# Patient Record
Sex: MALE | Race: ASIAN | Employment: FULL TIME | URBAN - METROPOLITAN AREA
[De-identification: names, ages, dates, MRNs, and addresses within clinical notes are randomized per-mention and may not be internally consistent; named-entity substitution may affect disease eponyms.]

---

## 2019-01-08 ENCOUNTER — OFFICE VISIT (OUTPATIENT)
Dept: FAMILY MEDICINE CLINIC | Facility: CLINIC | Age: 69
End: 2019-01-08
Payer: MEDICARE

## 2019-01-08 VITALS
DIASTOLIC BLOOD PRESSURE: 90 MMHG | SYSTOLIC BLOOD PRESSURE: 146 MMHG | BODY MASS INDEX: 22.22 KG/M2 | HEART RATE: 100 BPM | HEIGHT: 69 IN | RESPIRATION RATE: 12 BRPM | OXYGEN SATURATION: 99 % | WEIGHT: 150 LBS

## 2019-01-08 DIAGNOSIS — Z00.00 HEALTHCARE MAINTENANCE: ICD-10-CM

## 2019-01-08 DIAGNOSIS — Z12.11 COLON CANCER SCREENING: ICD-10-CM

## 2019-01-08 DIAGNOSIS — I10 ESSENTIAL HYPERTENSION: ICD-10-CM

## 2019-01-08 DIAGNOSIS — Z13.6 SCREENING FOR CARDIOVASCULAR CONDITION: ICD-10-CM

## 2019-01-08 DIAGNOSIS — R05.3 CHRONIC COUGH: Primary | ICD-10-CM

## 2019-01-08 DIAGNOSIS — Z12.5 PROSTATE CANCER SCREENING: ICD-10-CM

## 2019-01-08 LAB
ALBUMIN SERPL BCP-MCNC: 4.9 G/DL (ref 3.5–4.8)
ALBUMIN/GLOB SERPL: 1.5 {RATIO} (ref 1–2)
ALP SERPL-CCNC: 55 U/L (ref 32–100)
ALT SERPL-CCNC: 74 U/L (ref 17–63)
ANION GAP SERPL CALC-SCNC: 20 MMOL/L (ref 0–18)
AST SERPL-CCNC: 82 U/L (ref 15–41)
BACTERIA UR QL AUTO: NEGATIVE /HPF
BILIRUB SERPL-MCNC: 0.5 MG/DL (ref 0.3–1.2)
BILIRUB UR QL: NEGATIVE
BUN SERPL-MCNC: 6 MG/DL (ref 8–20)
BUN/CREAT SERPL: 7.7 (ref 10–20)
CALCIUM SERPL-MCNC: 9.8 MG/DL (ref 8.5–10.5)
CHLORIDE SERPL-SCNC: 99 MMOL/L (ref 95–110)
CHOLEST SERPL-MCNC: 282 MG/DL (ref 110–200)
CO2 SERPL-SCNC: 19 MMOL/L (ref 22–32)
COLOR UR: YELLOW
COMPLEXED PSA SERPL-MCNC: 8.56 NG/ML (ref 0.01–4)
CREAT SERPL-MCNC: 0.78 MG/DL (ref 0.5–1.5)
ERYTHROCYTE [DISTWIDTH] IN BLOOD BY AUTOMATED COUNT: 14 % (ref 11–15)
EST. AVERAGE GLUCOSE BLD GHB EST-MCNC: 108 MG/DL (ref 68–126)
GLOBULIN PLAS-MCNC: 3.3 G/DL (ref 2.5–3.7)
GLUCOSE SERPL-MCNC: 90 MG/DL (ref 70–99)
GLUCOSE UR-MCNC: NEGATIVE MG/DL
HBA1C MFR BLD HPLC: 5.4 % (ref ?–5.7)
HCT VFR BLD AUTO: 46.1 % (ref 41–52)
HDLC SERPL-MCNC: 170 MG/DL
HGB BLD-MCNC: 15.7 G/DL (ref 13.5–17.5)
HGB UR QL STRIP.AUTO: NEGATIVE
HYALINE CASTS #/AREA URNS AUTO: 4 /LPF
KETONES UR-MCNC: 20 MG/DL
LDLC SERPL CALC-MCNC: 93 MG/DL (ref 0–99)
LEUKOCYTE ESTERASE UR QL STRIP.AUTO: NEGATIVE
MCH RBC QN AUTO: 31.8 PG (ref 27–32)
MCHC RBC AUTO-ENTMCNC: 34.1 G/DL (ref 32–37)
MCV RBC AUTO: 93.3 FL (ref 80–100)
NITRITE UR QL STRIP.AUTO: NEGATIVE
NONHDLC SERPL-MCNC: 112 MG/DL
OSMOLALITY UR CALC.SUM OF ELEC: 283 MOSM/KG (ref 275–295)
PATIENT FASTING: YES
PH UR: 5 [PH] (ref 5–8)
PLATELET # BLD AUTO: 176 K/UL (ref 140–400)
PMV BLD AUTO: 7.7 FL (ref 7.4–10.3)
POTASSIUM SERPL-SCNC: 4.1 MMOL/L (ref 3.3–5.1)
PROT SERPL-MCNC: 8.2 G/DL (ref 5.9–8.4)
PROT UR-MCNC: >=500 MG/DL
PSA SERPL-MCNC: 7.2 NG/ML (ref 0–4)
RBC # BLD AUTO: 4.94 M/UL (ref 4.5–5.9)
RBC #/AREA URNS AUTO: 2 /HPF
SODIUM SERPL-SCNC: 138 MMOL/L (ref 136–144)
SP GR UR STRIP: 1.03 (ref 1–1.03)
TRIGL SERPL-MCNC: 94 MG/DL (ref 1–149)
TSH SERPL-ACNC: 2.68 UIU/ML (ref 0.45–5.33)
UROBILINOGEN UR STRIP-ACNC: <2
VIT C UR-MCNC: 40 MG/DL
WBC # BLD AUTO: 4.7 K/UL (ref 4–11)
WBC #/AREA URNS AUTO: <1 /HPF

## 2019-01-08 PROCEDURE — 99203 OFFICE O/P NEW LOW 30 MIN: CPT | Performed by: FAMILY MEDICINE

## 2019-01-08 PROCEDURE — 83036 HEMOGLOBIN GLYCOSYLATED A1C: CPT | Performed by: FAMILY MEDICINE

## 2019-01-08 PROCEDURE — 36415 COLL VENOUS BLD VENIPUNCTURE: CPT | Performed by: FAMILY MEDICINE

## 2019-01-08 PROCEDURE — 85027 COMPLETE CBC AUTOMATED: CPT | Performed by: FAMILY MEDICINE

## 2019-01-08 PROCEDURE — 80053 COMPREHEN METABOLIC PANEL: CPT | Performed by: FAMILY MEDICINE

## 2019-01-08 PROCEDURE — 80061 LIPID PANEL: CPT | Performed by: FAMILY MEDICINE

## 2019-01-08 PROCEDURE — 84443 ASSAY THYROID STIM HORMONE: CPT | Performed by: FAMILY MEDICINE

## 2019-01-08 PROCEDURE — 81001 URINALYSIS AUTO W/SCOPE: CPT | Performed by: FAMILY MEDICINE

## 2019-01-08 RX ORDER — AMLODIPINE BESYLATE 10 MG/1
10 TABLET ORAL DAILY
COMMUNITY
End: 2019-01-16

## 2019-01-08 NOTE — PROGRESS NOTES
HPI:   Patient presents with:  Cough: c/c gets a cough on and off x6 months  Other: never had a colonoscopy      Lacy Roger is a 80year old male presenting for:  Cough  -intermittent dry cough for past 6mo, mostly in the afternoon  -no GERD however kirby <5.7 %    Estimated Average Glucose 108 68 - 126 mg/dL   LIPID PANEL   Result Value Ref Range    HDL Cholesterol 170 mg/dL    Cholesterol, Total 282 (H) 110 - 200 mg/dL    Triglycerides 94 1 - 149 mg/dL    Non HDL Chol 112 <130 mg/dL    LDL Cholesterol 93 TSH 2.68 01/08/2019 10:33 AM          Medications:    Current Outpatient Medications: AmLODIPine Besylate 10 MG Oral Tab Take 10 mg by mouth daily.  Disp:  Rfl:       Past Medical History:   Diagnosis Date   • Essential hypertension          No past surgic reviewed. ASSESSMENT AND PLAN:   Patient is a 80year old male who presents primarily presents for:    1. Chronic cough  -unclear etiology  -given mild allergic rhinitis appears to be secondary to allergies.  Advises to do trial of antihistamines  -m Orders Placed This Encounter      CBC, Platelet, No Differential [E]      Comp Metabolic Panel (14) [E]      Hemoglobin A1C (Glycohemoglobin) [E]      Lipid Panel [E]      PSA (Screening) [E]      TSH W Reflex To Free T4 [E]      Urinalysis, Routine

## 2019-01-11 PROBLEM — R05.3 CHRONIC COUGH: Status: ACTIVE | Noted: 2019-01-11

## 2019-01-11 PROBLEM — I10 ESSENTIAL HYPERTENSION: Status: ACTIVE | Noted: 2019-01-11

## 2019-01-14 DIAGNOSIS — R97.20 ELEVATED PSA: Primary | ICD-10-CM

## 2019-01-14 DIAGNOSIS — N06.9 ISOLATED PROTEINURIA WITH MORPHOLOGIC LESION: ICD-10-CM

## 2019-01-16 ENCOUNTER — OFFICE VISIT (OUTPATIENT)
Dept: FAMILY MEDICINE CLINIC | Facility: CLINIC | Age: 69
End: 2019-01-16
Payer: MEDICARE

## 2019-01-16 VITALS
OXYGEN SATURATION: 98 % | RESPIRATION RATE: 14 BRPM | BODY MASS INDEX: 22.22 KG/M2 | HEIGHT: 69 IN | DIASTOLIC BLOOD PRESSURE: 100 MMHG | HEART RATE: 110 BPM | WEIGHT: 150 LBS | SYSTOLIC BLOOD PRESSURE: 150 MMHG

## 2019-01-16 DIAGNOSIS — N06.9 ISOLATED PROTEINURIA WITH MORPHOLOGIC LESION: Primary | ICD-10-CM

## 2019-01-16 DIAGNOSIS — R97.20 ELEVATED PSA, LESS THAN 10 NG/ML: ICD-10-CM

## 2019-01-16 DIAGNOSIS — I10 ESSENTIAL HYPERTENSION: ICD-10-CM

## 2019-01-16 LAB
BACTERIA UR QL AUTO: NEGATIVE /HPF
BILIRUB UR QL: NEGATIVE
COLOR UR: YELLOW
CREAT UR-MCNC: 54.6 MG/DL
GLUCOSE UR-MCNC: NEGATIVE MG/DL
HGB UR QL STRIP.AUTO: NEGATIVE
LEUKOCYTE ESTERASE UR QL STRIP.AUTO: NEGATIVE
MICROALBUMIN UR-MCNC: 95 MG/DL (ref 0–1.8)
MICROALBUMIN/CREAT UR: 1739.9 MG/G{CREAT} (ref 0–20)
NITRITE UR QL STRIP.AUTO: NEGATIVE
PH UR: 6 [PH] (ref 5–8)
PROT UR-MCNC: 100 MG/DL
RBC #/AREA URNS AUTO: 0 /HPF
SP GR UR STRIP: 1.01 (ref 1–1.03)
UROBILINOGEN UR STRIP-ACNC: <2
VIT C UR-MCNC: 40 MG/DL
WBC #/AREA URNS AUTO: <1 /HPF

## 2019-01-16 PROCEDURE — 82570 ASSAY OF URINE CREATININE: CPT | Performed by: FAMILY MEDICINE

## 2019-01-16 PROCEDURE — 99214 OFFICE O/P EST MOD 30 MIN: CPT | Performed by: FAMILY MEDICINE

## 2019-01-16 PROCEDURE — 82043 UR ALBUMIN QUANTITATIVE: CPT | Performed by: FAMILY MEDICINE

## 2019-01-16 PROCEDURE — 81001 URINALYSIS AUTO W/SCOPE: CPT | Performed by: FAMILY MEDICINE

## 2019-01-16 RX ORDER — AMLODIPINE BESYLATE AND BENAZEPRIL HYDROCHLORIDE 10; 20 MG/1; MG/1
1 CAPSULE ORAL DAILY
Qty: 90 CAPSULE | Refills: 0 | Status: SHIPPED | OUTPATIENT
Start: 2019-01-16 | End: 2019-04-16

## 2019-01-21 PROBLEM — R97.20 ELEVATED PSA, LESS THAN 10 NG/ML: Status: ACTIVE | Noted: 2019-01-21

## 2019-01-21 PROBLEM — N06.9 ISOLATED PROTEINURIA WITH MORPHOLOGIC LESION: Status: ACTIVE | Noted: 2019-01-21

## 2019-01-21 NOTE — PROGRESS NOTES
HPI:   Patient presents with:  Lab Results      Riki Clayton is a 80year old male presenting for:  HTN  -Taking meds as prescribed-tolerating well without SEs.    -Denies CP, Palpitations, Dizziness, Recent fall, leg edema, SOB, Cough, LOC, HAs, Numbness 74 (H) 01/08/2019 10:33 AM    BILT 0.5 01/08/2019 10:33 AM    TSH 2.68 01/08/2019 10:33 AM          Medications:    Current Outpatient Medications:  Amlodipine Besy-Benazepril HCl 10-20 MG Oral Cap Take 1 capsule by mouth daily.  Disp: 90 capsule Rfl: 0 no edema. Vitals reviewed. ASSESSMENT AND PLAN:   Patient is a 80year old male who presents primarily presents for:    1.  Isolated proteinuria with morphologic lesion  -unclear tiology  -repeat to r/o transient proteinuria  -if still elevated karina

## 2019-01-26 DIAGNOSIS — N06.9 ISOLATED PROTEINURIA WITH MORPHOLOGIC LESION: Primary | ICD-10-CM

## 2019-01-26 DIAGNOSIS — R80.9 MICROALBUMINURIA: ICD-10-CM

## 2019-01-26 DIAGNOSIS — I10 ESSENTIAL HYPERTENSION: ICD-10-CM

## 2019-01-28 ENCOUNTER — TELEPHONE (OUTPATIENT)
Dept: FAMILY MEDICINE CLINIC | Facility: CLINIC | Age: 69
End: 2019-01-28

## 2019-01-28 DIAGNOSIS — R97.20 ELEVATED PSA, LESS THAN 10 NG/ML: Primary | ICD-10-CM

## 2019-01-28 NOTE — TELEPHONE ENCOUNTER
----- Message from Cory Miller MD sent at 1/26/2019 12:23 PM CST -----  Can you please let him know that he is still having too much protein in his urine. We need to send him to a kidney specialist as he needs further testing.   I placed the re

## 2019-02-12 ENCOUNTER — TELEPHONE (OUTPATIENT)
Dept: FAMILY MEDICINE CLINIC | Facility: CLINIC | Age: 69
End: 2019-02-12

## 2019-02-12 NOTE — TELEPHONE ENCOUNTER
Can you please call the daugther and explain that we need permission from the patient in order to disclose results. Then verify with patient if that is ok.   Thanks

## 2019-04-16 ENCOUNTER — APPOINTMENT (OUTPATIENT)
Dept: LAB | Facility: HOSPITAL | Age: 69
End: 2019-04-16
Attending: FAMILY MEDICINE
Payer: MEDICARE

## 2019-04-16 ENCOUNTER — OFFICE VISIT (OUTPATIENT)
Dept: FAMILY MEDICINE CLINIC | Facility: CLINIC | Age: 69
End: 2019-04-16
Payer: MEDICARE

## 2019-04-16 VITALS
TEMPERATURE: 99 F | OXYGEN SATURATION: 99 % | HEART RATE: 115 BPM | BODY MASS INDEX: 22.96 KG/M2 | SYSTOLIC BLOOD PRESSURE: 188 MMHG | RESPIRATION RATE: 13 BRPM | WEIGHT: 155 LBS | DIASTOLIC BLOOD PRESSURE: 100 MMHG | HEIGHT: 69 IN

## 2019-04-16 DIAGNOSIS — I10 ESSENTIAL HYPERTENSION: ICD-10-CM

## 2019-04-16 DIAGNOSIS — I10 ESSENTIAL HYPERTENSION: Primary | ICD-10-CM

## 2019-04-16 DIAGNOSIS — N06.9 ISOLATED PROTEINURIA WITH MORPHOLOGIC LESION: ICD-10-CM

## 2019-04-16 DIAGNOSIS — R00.0 TACHYCARDIA: ICD-10-CM

## 2019-04-16 DIAGNOSIS — R97.20 ELEVATED PSA, LESS THAN 10 NG/ML: ICD-10-CM

## 2019-04-16 PROCEDURE — 93005 ELECTROCARDIOGRAM TRACING: CPT

## 2019-04-16 PROCEDURE — 93010 ELECTROCARDIOGRAM REPORT: CPT | Performed by: FAMILY MEDICINE

## 2019-04-16 PROCEDURE — 99214 OFFICE O/P EST MOD 30 MIN: CPT | Performed by: FAMILY MEDICINE

## 2019-04-16 RX ORDER — AMLODIPINE BESYLATE AND BENAZEPRIL HYDROCHLORIDE 10; 20 MG/1; MG/1
1 CAPSULE ORAL DAILY
Qty: 90 CAPSULE | Refills: 0 | Status: SHIPPED | OUTPATIENT
Start: 2019-04-16 | End: 2019-07-10

## 2019-04-16 RX ORDER — AMLODIPINE BESYLATE AND BENAZEPRIL HYDROCHLORIDE 10; 20 MG/1; MG/1
1 CAPSULE ORAL DAILY
Qty: 90 CAPSULE | Refills: 0 | Status: SHIPPED | OUTPATIENT
Start: 2019-04-16 | End: 2019-04-16

## 2019-04-16 RX ORDER — AMLODIPINE BESYLATE AND BENAZEPRIL HYDROCHLORIDE 10; 20 MG/1; MG/1
CAPSULE ORAL
Qty: 90 CAPSULE | Refills: 0 | OUTPATIENT
Start: 2019-04-16

## 2019-04-16 NOTE — PROGRESS NOTES
HPI:   Patient presents with:  Blood Pressure: blood pressure follow up      Franco Jeffery is a 80year old male presenting for:    Just returned from [place] 1wk ago  -While there was sick with flu. Sx improving gradually.  Still w/ cough and some nasal con CO2 19 (L) 01/08/2019 10:33 AM    CREATSERUM 0.78 01/08/2019 10:33 AM    CA 9.8 01/08/2019 10:33 AM    ALB 4.9 (H) 01/08/2019 10:33 AM    TP 8.2 01/08/2019 10:33 AM    ALKPHO 55 01/08/2019 10:33 AM    AST 82 (H) 01/08/2019 10:33 AM    ALT 74 (H) 01/08/2 Regular rhythm and normal heart sounds. Tachycardia present. No murmur heard. Pulmonary/Chest: Effort normal and breath sounds normal. No respiratory distress. Abdominal: Soft. Bowel sounds are normal. He exhibits no distension.  There is no tenderness 08/27/1950  Annual Physical due on 08/27/1953  Annual Depression Screen due on 08/27/1963  FIT Colorectal Screening due on 08/27/2001  Fall Risk Screening due on 08/27/2016  Pneumococcal PPSV23/PCV13 65+ Years / Low and Medium Risk(1 of 2 - PCV13) due on 0

## 2019-07-10 RX ORDER — AMLODIPINE BESYLATE AND BENAZEPRIL HYDROCHLORIDE 10; 20 MG/1; MG/1
CAPSULE ORAL
Qty: 30 CAPSULE | Refills: 0 | Status: SHIPPED | OUTPATIENT
Start: 2019-07-10 | End: 2019-09-26

## 2019-09-26 ENCOUNTER — TELEPHONE (OUTPATIENT)
Dept: FAMILY MEDICINE CLINIC | Facility: CLINIC | Age: 69
End: 2019-09-26

## 2019-09-26 RX ORDER — AMLODIPINE BESYLATE AND BENAZEPRIL HYDROCHLORIDE 10; 20 MG/1; MG/1
1 CAPSULE ORAL
Qty: 15 CAPSULE | Refills: 0 | Status: SHIPPED | OUTPATIENT
Start: 2019-09-26 | End: 2019-11-06

## 2019-09-26 NOTE — TELEPHONE ENCOUNTER
Patient is going to be out of town for two weeks requesting blood pressure medication to be sent, refill for 15 days sent pt will call to set up appointment no further refills will be given until he is seen

## 2019-11-05 ENCOUNTER — TELEPHONE (OUTPATIENT)
Dept: FAMILY MEDICINE CLINIC | Facility: CLINIC | Age: 69
End: 2019-11-05

## 2019-11-05 NOTE — TELEPHONE ENCOUNTER
Pt called requesting refills for amlodipine. He states he is going out of town on Friday and needs his medication. He asked for at least 3 months refills.  Pt wants his medication to be refilled at [facility]

## 2019-11-06 RX ORDER — AMLODIPINE BESYLATE AND BENAZEPRIL HYDROCHLORIDE 10; 20 MG/1; MG/1
1 CAPSULE ORAL
Qty: 14 CAPSULE | Refills: 0 | Status: SHIPPED | OUTPATIENT
Start: 2019-11-06 | End: 2019-11-06

## 2019-11-06 RX ORDER — AMLODIPINE BESYLATE AND BENAZEPRIL HYDROCHLORIDE 10; 20 MG/1; MG/1
1 CAPSULE ORAL
Qty: 14 CAPSULE | Refills: 0 | Status: SHIPPED | OUTPATIENT
Start: 2019-11-06 | End: 2021-01-20 | Stop reason: SINTOL

## 2019-11-06 NOTE — TELEPHONE ENCOUNTER
Called patient informed him per conversation in September he was suppose to be seen prior to any additional refills,last office visit was in April and his blood pressure was 188/100 I offered patient an appointment to be seen today at 10am he refused state

## 2019-11-06 NOTE — TELEPHONE ENCOUNTER
Patient called back relayed message below per patient he is unable to come in informed him only 2 weeks supply could be given and that he needs to be seen due to elevated b/p  patient verbalized understanding did not wish to come in tomorrow for appointmen

## 2019-11-06 NOTE — TELEPHONE ENCOUNTER
Can you please let him know that we will NOT provide a 3 month refill. On 4/2019 his BP was very high as well as on 1/2019.  He also has been noncompliant with follow-up to the urologist for his elevated prostate levels and the kidney specialist for his ab

## 2020-03-31 RX ORDER — AMLODIPINE BESYLATE AND BENAZEPRIL HYDROCHLORIDE 10; 20 MG/1; MG/1
1 CAPSULE ORAL
Qty: 14 CAPSULE | Refills: 0 | OUTPATIENT
Start: 2020-03-31

## 2020-04-01 RX ORDER — AMLODIPINE BESYLATE AND BENAZEPRIL HYDROCHLORIDE 10; 20 MG/1; MG/1
1 CAPSULE ORAL
Qty: 14 CAPSULE | Refills: 0 | OUTPATIENT
Start: 2020-04-01

## 2020-11-16 ENCOUNTER — TELEPHONE (OUTPATIENT)
Dept: FAMILY MEDICINE CLINIC | Facility: CLINIC | Age: 70
End: 2020-11-16

## 2020-11-16 NOTE — TELEPHONE ENCOUNTER
Livier Morgan called requesting appointment for patient to be seen I informed her she was not listed on patient's consent and we would not be able to schedule or inform her of any medical information, she can have patient or his two other emergency contacts call

## 2020-12-28 ENCOUNTER — HOSPITAL ENCOUNTER (OUTPATIENT)
Age: 70
Discharge: HOME | End: 2020-12-28
Payer: MEDICARE

## 2020-12-28 VITALS
BODY MASS INDEX: 23.49 KG/M2 | HEART RATE: 110 BPM | DIASTOLIC BLOOD PRESSURE: 105 MMHG | RESPIRATION RATE: 18 BRPM | SYSTOLIC BLOOD PRESSURE: 190 MMHG | HEIGHT: 68 IN | WEIGHT: 155 LBS | TEMPERATURE: 97 F | OXYGEN SATURATION: 99 %

## 2020-12-28 DIAGNOSIS — R05.9 COUGH: Primary | ICD-10-CM

## 2020-12-28 PROCEDURE — 99203 OFFICE O/P NEW LOW 30 MIN: CPT | Performed by: EMERGENCY MEDICINE

## 2020-12-28 RX ORDER — BENZONATATE 100 MG/1
100 CAPSULE ORAL 3 TIMES DAILY PRN
Qty: 30 CAPSULE | Refills: 0 | Status: SHIPPED | OUTPATIENT
Start: 2020-12-28 | End: 2021-01-20 | Stop reason: ALTCHOICE

## 2020-12-28 NOTE — ED PROVIDER NOTES
Patient Seen in: [facility]      History   Patient presents with:  Cough/URI    Stated Complaint: Dry Cough/Sore Throat    Angela Bustillo is a 72year old male here for for chronic cough.   Medical history includes but not limited to hypertension normal.      Left Ear: External ear normal.      Nose: Nose normal.      Mouth/Throat:      Mouth: Mucous membranes are moist.      Pharynx: No posterior oropharyngeal erythema. Neck:      Musculoskeletal: Normal range of motion.    Cardiovascular:      R [id number]  [phone]                Medications Prescribed:  Discharge Medication List as of 12/28/2020 12:31 PM    START taking these medications    benzonatate 100 MG Oral Cap  Take 1 capsule (100 mg total) by mouth 3 (three) times daily as needed for cou

## 2021-01-07 ENCOUNTER — TELEPHONE (OUTPATIENT)
Dept: INTERNAL MEDICINE CLINIC | Facility: CLINIC | Age: 71
End: 2021-01-07

## 2021-01-07 NOTE — TELEPHONE ENCOUNTER
Pt's daughter would like the doctor to give her a call regarding her father's condition before he comes in for his appt on 1/20/21.

## 2021-01-12 NOTE — TELEPHONE ENCOUNTER
Can you please call daughter and discuss her concerns to determine if I need to speak with her. She is not listed for releasing info thus I'm unable to discuss anything regarding his health.

## 2021-01-13 NOTE — TELEPHONE ENCOUNTER
Spoke to daughter informed her she is not on the verbal consent and we can not release information to her, she understood and stated she will speak to patient so she could be added during his next appointment

## 2021-01-20 ENCOUNTER — OFFICE VISIT (OUTPATIENT)
Dept: FAMILY MEDICINE CLINIC | Facility: CLINIC | Age: 71
End: 2021-01-20
Payer: MEDICARE

## 2021-01-20 VITALS
DIASTOLIC BLOOD PRESSURE: 100 MMHG | OXYGEN SATURATION: 98 % | BODY MASS INDEX: 25.01 KG/M2 | HEIGHT: 68 IN | WEIGHT: 165 LBS | SYSTOLIC BLOOD PRESSURE: 160 MMHG | HEART RATE: 100 BPM

## 2021-01-20 DIAGNOSIS — I10 ESSENTIAL HYPERTENSION: ICD-10-CM

## 2021-01-20 DIAGNOSIS — Z12.5 ENCOUNTER FOR SCREENING FOR MALIGNANT NEOPLASM OF PROSTATE: ICD-10-CM

## 2021-01-20 DIAGNOSIS — Z13.31 DEPRESSION SCREENING: ICD-10-CM

## 2021-01-20 DIAGNOSIS — Z13.39 SCREENING FOR ALCOHOL PROBLEM: ICD-10-CM

## 2021-01-20 DIAGNOSIS — N06.9 ISOLATED PROTEINURIA WITH MORPHOLOGIC LESION: ICD-10-CM

## 2021-01-20 DIAGNOSIS — Z00.00 ENCOUNTER FOR ANNUAL HEALTH EXAMINATION: Primary | ICD-10-CM

## 2021-01-20 DIAGNOSIS — R97.20 ELEVATED PSA, LESS THAN 10 NG/ML: ICD-10-CM

## 2021-01-20 DIAGNOSIS — Z00.00 HEALTHCARE MAINTENANCE: ICD-10-CM

## 2021-01-20 DIAGNOSIS — R05.3 CHRONIC COUGH: ICD-10-CM

## 2021-01-20 DIAGNOSIS — Z12.11 COLON CANCER SCREENING: ICD-10-CM

## 2021-01-20 DIAGNOSIS — R74.01 TRANSAMINITIS: ICD-10-CM

## 2021-01-20 PROCEDURE — 84443 ASSAY THYROID STIM HORMONE: CPT | Performed by: FAMILY MEDICINE

## 2021-01-20 PROCEDURE — 99213 OFFICE O/P EST LOW 20 MIN: CPT | Performed by: FAMILY MEDICINE

## 2021-01-20 PROCEDURE — 81001 URINALYSIS AUTO W/SCOPE: CPT | Performed by: FAMILY MEDICINE

## 2021-01-20 PROCEDURE — 85025 COMPLETE CBC W/AUTO DIFF WBC: CPT | Performed by: FAMILY MEDICINE

## 2021-01-20 PROCEDURE — 83036 HEMOGLOBIN GLYCOSYLATED A1C: CPT | Performed by: FAMILY MEDICINE

## 2021-01-20 PROCEDURE — 36415 COLL VENOUS BLD VENIPUNCTURE: CPT | Performed by: FAMILY MEDICINE

## 2021-01-20 PROCEDURE — G0444 DEPRESSION SCREEN ANNUAL: HCPCS | Performed by: FAMILY MEDICINE

## 2021-01-20 PROCEDURE — G0439 PPPS, SUBSEQ VISIT: HCPCS | Performed by: FAMILY MEDICINE

## 2021-01-20 PROCEDURE — 82043 UR ALBUMIN QUANTITATIVE: CPT | Performed by: FAMILY MEDICINE

## 2021-01-20 PROCEDURE — G0442 ANNUAL ALCOHOL SCREEN 15 MIN: HCPCS | Performed by: FAMILY MEDICINE

## 2021-01-20 PROCEDURE — 80053 COMPREHEN METABOLIC PANEL: CPT | Performed by: FAMILY MEDICINE

## 2021-01-20 PROCEDURE — 80061 LIPID PANEL: CPT | Performed by: FAMILY MEDICINE

## 2021-01-20 PROCEDURE — 82570 ASSAY OF URINE CREATININE: CPT | Performed by: FAMILY MEDICINE

## 2021-01-20 RX ORDER — AMLODIPINE AND OLMESARTAN MEDOXOMIL 10; 20 MG/1; MG/1
1 TABLET ORAL DAILY
Qty: 90 TABLET | Refills: 0 | Status: SHIPPED | OUTPATIENT
Start: 2021-01-20 | End: 2021-01-21

## 2021-01-20 NOTE — PROGRESS NOTES
CMP,TSH,UA,LIPID,A1C,MICRO,PSA,and CBC labs drawn per Dr Tim Spence orders, Patient tolerated lab draw well

## 2021-01-20 NOTE — PROGRESS NOTES
HPI:   Gadiel Raman is a 72year old male who presents for a Medicare Subsequent Annual Wellness visit (Pt already had Initial Annual Wellness). Not seen in clinic for almost 2yrs.   Pt has not followed up with urologist for his elevated PSA now t of advance directives standard forms performed Face to Face with patient and Family/surrogate (if present), and forms available to patient in AVS     He does NOT have a Power of  for [employer] on file in [address] [place] [address] Rd.    Advance care planning including th cancer in his father. SOCIAL HISTORY:   He  reports that he has never smoked. He has never used smokeless tobacco. He reports current alcohol use. No history on file for drug.      REVIEW OF SYSTEMS:   Review of Systems   Constitutional: Negative for fati Bowel sounds are normal. He exhibits distension (mild). He exhibits no mass. There is no hepatosplenomegaly. There is no abdominal tenderness. Musculoskeletal: Normal range of motion.       Right knee: Normal.      Left knee: Normal.      Comments: +CREPITU 10 ng/ml  -     PSA SCREEN; Future  -     UROLOGY - INTERNAL  -STRONGLY ADVISED TO SEE UROLOGIST THAT HE HAS PREVIOUSLY BEEN REFERRED BUT NOT COMPLIANT    Isolated proteinuria with morphologic lesion  -     MICROALB/CREAT RATIO, RANDOM URINE;  Future  - chart, separate sheet to patient  [address] Internal Lab or Procedure External Lab or Procedure   Diabetes Screening      HbgA1C   Annually HgbA1C (%)   Date Value   01/20/2021 5.9 (H)       No flowsheet data found. Medicare Part B) No vaccine history found This may be covered with your prescription benefits, but Medicare does not cover unless Medically needed    Zoster   Not covered by Medicare Part B No vaccine history found This may be covered with your pharmacy  p

## 2021-01-21 ENCOUNTER — TELEPHONE (OUTPATIENT)
Dept: FAMILY MEDICINE CLINIC | Facility: CLINIC | Age: 71
End: 2021-01-21

## 2021-01-21 LAB
ALBUMIN SERPL-MCNC: 4.2 G/DL (ref 3.4–5)
ALBUMIN/GLOB SERPL: 0.9 {RATIO} (ref 1–2)
ALP LIVER SERPL-CCNC: 71 U/L
ALT SERPL-CCNC: 65 U/L
ANION GAP SERPL CALC-SCNC: 13 MMOL/L (ref 0–18)
AST SERPL-CCNC: 92 U/L (ref 15–37)
BACTERIA UR QL AUTO: NEGATIVE /HPF
BASOPHILS # BLD AUTO: 0.04 X10(3) UL (ref 0–0.2)
BASOPHILS NFR BLD AUTO: 1.2 %
BILIRUB SERPL-MCNC: 0.5 MG/DL (ref 0.1–2)
BILIRUB UR QL: NEGATIVE
BUN BLD-MCNC: 12 MG/DL (ref 7–18)
BUN/CREAT SERPL: 13 (ref 10–20)
CALCIUM BLD-MCNC: 9.5 MG/DL (ref 8.5–10.1)
CHLORIDE SERPL-SCNC: 93 MMOL/L (ref 98–112)
CHOLEST SMN-MCNC: 221 MG/DL (ref ?–200)
CO2 SERPL-SCNC: 22 MMOL/L (ref 21–32)
COLOR UR: YELLOW
COMPLEXED PSA SERPL-MCNC: 10.4 NG/ML (ref ?–4)
CREAT BLD-MCNC: 0.92 MG/DL
CREAT UR-SCNC: 159 MG/DL
DEPRECATED RDW RBC AUTO: 48.9 FL (ref 35.1–46.3)
EOSINOPHIL # BLD AUTO: 0.03 X10(3) UL (ref 0–0.7)
EOSINOPHIL NFR BLD AUTO: 0.9 %
ERYTHROCYTE [DISTWIDTH] IN BLOOD BY AUTOMATED COUNT: 14.9 % (ref 11–15)
EST. AVERAGE GLUCOSE BLD GHB EST-MCNC: 123 MG/DL (ref 68–126)
GLOBULIN PLAS-MCNC: 4.9 G/DL (ref 2.8–4.4)
GLUCOSE BLD-MCNC: 77 MG/DL (ref 70–99)
GLUCOSE UR-MCNC: NEGATIVE MG/DL
HBA1C MFR BLD HPLC: 5.9 % (ref ?–5.7)
HCT VFR BLD AUTO: 33.9 %
HDLC SERPL-MCNC: 99 MG/DL (ref 40–59)
HGB BLD-MCNC: 10.4 G/DL
HGB UR QL STRIP.AUTO: NEGATIVE
HYALINE CASTS #/AREA URNS AUTO: 7 /LPF
IMM GRANULOCYTES # BLD AUTO: 0 X10(3) UL (ref 0–1)
IMM GRANULOCYTES NFR BLD: 0 %
LDLC SERPL CALC-MCNC: 105 MG/DL (ref ?–100)
LEUKOCYTE ESTERASE UR QL STRIP.AUTO: NEGATIVE
LYMPHOCYTES # BLD AUTO: 0.64 X10(3) UL (ref 1–4)
LYMPHOCYTES NFR BLD AUTO: 18.9 %
M PROTEIN MFR SERPL ELPH: 9.1 G/DL (ref 6.4–8.2)
MCH RBC QN AUTO: 27.7 PG (ref 26–34)
MCHC RBC AUTO-ENTMCNC: 30.7 G/DL (ref 31–37)
MCV RBC AUTO: 90.2 FL
MICROALBUMIN UR-MCNC: 123 MG/DL
MICROALBUMIN/CREAT 24H UR-RTO: 773.6 UG/MG (ref ?–30)
MONOCYTES # BLD AUTO: 0.54 X10(3) UL (ref 0.1–1)
MONOCYTES NFR BLD AUTO: 15.9 %
NEUTROPHILS # BLD AUTO: 2.14 X10 (3) UL (ref 1.5–7.7)
NEUTROPHILS # BLD AUTO: 2.14 X10(3) UL (ref 1.5–7.7)
NEUTROPHILS NFR BLD AUTO: 63.1 %
NITRITE UR QL STRIP.AUTO: NEGATIVE
NONHDLC SERPL-MCNC: 122 MG/DL (ref ?–130)
OSMOLALITY SERPL CALC.SUM OF ELEC: 265 MOSM/KG (ref 275–295)
PATIENT FASTING Y/N/NP: NO
PATIENT FASTING Y/N/NP: NO
PH UR: 6 [PH] (ref 5–8)
PLATELET # BLD AUTO: 196 10(3)UL (ref 150–450)
POTASSIUM SERPL-SCNC: 4.1 MMOL/L (ref 3.5–5.1)
PROT UR-MCNC: >=500 MG/DL
RBC # BLD AUTO: 3.76 X10(6)UL
RBC #/AREA URNS AUTO: <1 /HPF
SODIUM SERPL-SCNC: 128 MMOL/L (ref 136–145)
SP GR UR STRIP: 1.02 (ref 1–1.03)
TRIGL SERPL-MCNC: 87 MG/DL (ref 30–149)
TSI SER-ACNC: 2.41 MIU/ML (ref 0.36–3.74)
UROBILINOGEN UR STRIP-ACNC: <2
VLDLC SERPL CALC-MCNC: 17 MG/DL (ref 0–30)
WBC # BLD AUTO: 3.4 X10(3) UL (ref 4–11)
WBC #/AREA URNS AUTO: 1 /HPF

## 2021-01-21 RX ORDER — AMLODIPINE AND VALSARTAN 10; 320 MG/1; MG/1
1 TABLET ORAL DAILY
Qty: 90 TABLET | Refills: 0 | Status: SHIPPED | OUTPATIENT
Start: 2021-01-21 | End: 2021-04-17

## 2021-01-21 NOTE — TELEPHONE ENCOUNTER
Patient is calling because he came in yesterday and got prescribed, amLODIPine-Olmesartan 10-20 MG Oral Tab ,  Stating that the he went to the pharmacy they started that insurance would not cover the cost of medication.  Patient would like another alternati

## 2021-01-21 NOTE — TELEPHONE ENCOUNTER
Pharmacy called stating that medication: amLODIPine-Olmesartan 10-20 MG Oral Tab, is not being approved by insurance. Needs a prior authorization or a new medication prescribed.  Pharmacy states that patient is waiting inside store, will advice that doctor

## 2021-01-21 NOTE — TELEPHONE ENCOUNTER
Bharat Segal 5 minutes ago (3:48 PM)        Patient is calling because he came in yesterday and got prescribed, amLODIPine-Olmesartan 10-20 MG Oral Tab ,  Stating that the he went to the pharmacy they started that insurance would not cover the cost of

## 2021-01-25 ENCOUNTER — TELEPHONE (OUTPATIENT)
Dept: FAMILY MEDICINE CLINIC | Facility: CLINIC | Age: 71
End: 2021-01-25

## 2021-01-25 NOTE — TELEPHONE ENCOUNTER
May 15, 2020      [place] [place] [facility]  [address] [facility].  [place] [address]  Phone: [phone]  Fax: [phone]       Patient: Paloma Bang   YOB: 1965  Date of Visit: 05/15/2020    To Whom It May Concern:    Ritika Bang  was at [facility] on 05/15/2020. She may return to work/school on 06/15/2020 with no restrictions. If you have any questions or concerns, or if I can be of further assistance, please do not hesitate to contact me.    Sincerely,    Jenn Mayorga MD      Daughter is calling wanting to speak to Dr. Julieta Winn or Nurse regarding blood work results.

## 2021-01-26 NOTE — TELEPHONE ENCOUNTER
[employer]    Result Notes   Yoan Masters MD   2021  5:54 PM      Discussed results w/ daughter (ok per verbal release).    Re-entered previously  referral to urology for elevated PSA, neprho for microalbuminuria/proteinu

## 2021-02-01 NOTE — PATIENT INSTRUCTIONS
Lloyd Bui's SCREENING SCHEDULE   Tests on this list are recommended by your physician but may not be covered, or covered at this frequency, by your insurer. Please check with your insurance carrier before scheduling to verify coverage.     PREVENTATIVE 10 years- more often if abnormal Colonoscopy due on 08/27/2001 Update [facility] if applicable    Flex Sigmoidoscopy Screen  Covered every 5 years No results found for this or any previous visit. No flowsheet data found.      Fecal Occult Blood   Co unless Medically needed    Zoster (Not covered by Medicare Part B) No orders found for this or any previous visit. This may be covered with your pharmacy  prescription benefits     Recommended Websites for Advanced Directives    [email or web address]

## 2021-02-16 ENCOUNTER — OFFICE VISIT (OUTPATIENT)
Dept: NEPHROLOGY | Facility: CLINIC | Age: 71
End: 2021-02-16
Payer: MEDICARE

## 2021-02-16 VITALS
SYSTOLIC BLOOD PRESSURE: 172 MMHG | HEIGHT: 69 IN | BODY MASS INDEX: 24.44 KG/M2 | DIASTOLIC BLOOD PRESSURE: 93 MMHG | HEART RATE: 112 BPM | WEIGHT: 165 LBS

## 2021-02-16 DIAGNOSIS — E55.9 VITAMIN D DEFICIENCY: ICD-10-CM

## 2021-02-16 DIAGNOSIS — R80.9 NON-NEPHROTIC RANGE PROTEINURIA: Primary | ICD-10-CM

## 2021-02-16 PROCEDURE — 99205 OFFICE O/P NEW HI 60 MIN: CPT | Performed by: INTERNAL MEDICINE

## 2021-02-16 NOTE — PATIENT INSTRUCTIONS
Follow up in 4 weeks   Lab test prior to next visit  Ultrasound of kidney - call to make an appointment   24 hrs urine test as ordered

## 2021-02-16 NOTE — PROGRESS NOTES
Consult Requested By: Dr. Summer Munoz    Reason for Consult: Proteinuria     HPI:     Patient is a 72 yrs old male with pmh of HTN, alcoholism, CKD stage I with proteinuria who presented for work up and evaluation of kidney disease     Lab t vision loss  Cardiovascular:  Negative for chest pain, sobs  Respiratory:  Negative for cough, dyspnea and wheezing  Gastrointestinal:  Negative for abdominal pain, constipation  Genitourinary:  Negative for dysuria and hematuria  Endocrine:  Negative for up in 4 weeks        Orders This Visit:  Orders Placed This Encounter      Immunoglobulin free LT chains blood [E]      Immunofixation (YUE) [E]      Immunoglobulin A/G/M, Quant      Creatinine, 24-Hour Urine      Protein, 24-Hr Urine [E]      Sed Rate, We

## 2021-02-23 ENCOUNTER — OFFICE VISIT (OUTPATIENT)
Dept: SURGERY | Facility: CLINIC | Age: 71
End: 2021-02-23
Payer: MEDICARE

## 2021-02-23 VITALS
DIASTOLIC BLOOD PRESSURE: 83 MMHG | SYSTOLIC BLOOD PRESSURE: 156 MMHG | HEART RATE: 102 BPM | BODY MASS INDEX: 23 KG/M2 | WEIGHT: 155 LBS

## 2021-02-23 DIAGNOSIS — R97.20 ELEVATED PSA: Primary | ICD-10-CM

## 2021-02-23 PROCEDURE — 99204 OFFICE O/P NEW MOD 45 MIN: CPT | Performed by: UROLOGY

## 2021-02-23 RX ORDER — CIPROFLOXACIN 500 MG/1
500 TABLET, FILM COATED ORAL 2 TIMES DAILY
Qty: 6 TABLET | Refills: 0 | Status: SHIPPED | OUTPATIENT
Start: 2021-02-23 | End: 2021-04-12 | Stop reason: ALTCHOICE

## 2021-02-23 RX ORDER — CEFDINIR 300 MG/1
300 CAPSULE ORAL EVERY 12 HOURS
Qty: 6 CAPSULE | Refills: 0 | Status: SHIPPED | OUTPATIENT
Start: 2021-02-23 | End: 2021-02-26

## 2021-02-23 NOTE — PROGRESS NOTES
SUBJECTIVE:  Elizabeth Juarez is a 72year old male who presents for a consultation at the request of, and a copy of this note will be sent to, Dr. Latrell Benito, for evaluation of  elevated PSA. He states that the problem is unchanged.  Symptoms include not Location: Left arm, Patient Position: Sitting, Cuff Size: adult)   Pulse 102   Wt 155 lb (70.3 kg)   BMI 22.89 kg/m²   He appears well, in no apparent distress. Alert and oriented times three, pleasant and cooperative.   CARDIOVASCULAR:normal apical impuls

## 2021-02-28 ENCOUNTER — LAB ENCOUNTER (OUTPATIENT)
Dept: LAB | Facility: HOSPITAL | Age: 71
End: 2021-02-28
Attending: INTERNAL MEDICINE
Payer: MEDICARE

## 2021-02-28 DIAGNOSIS — R80.9 NON-NEPHROTIC RANGE PROTEINURIA: ICD-10-CM

## 2021-02-28 PROCEDURE — 84156 ASSAY OF PROTEIN URINE: CPT

## 2021-02-28 PROCEDURE — 82570 ASSAY OF URINE CREATININE: CPT

## 2021-03-01 LAB
CREAT UR-SCNC: 0.95 G/24 HR (ref 0.95–2.49)
M PROTEIN 24H UR ELPH-MRATE: 232.9 MG/24 HR (ref ?–149.1)
SPECIMEN VOL UR: 1420 ML
SPECIMEN VOL UR: 1420 ML

## 2021-03-04 ENCOUNTER — HOSPITAL ENCOUNTER (OUTPATIENT)
Dept: ULTRASOUND IMAGING | Age: 71
Discharge: HOME | End: 2021-03-04
Attending: FAMILY MEDICINE
Payer: MEDICARE

## 2021-03-04 ENCOUNTER — HOSPITAL ENCOUNTER (OUTPATIENT)
Dept: ULTRASOUND IMAGING | Age: 71
Discharge: HOME | End: 2021-03-04
Attending: INTERNAL MEDICINE
Payer: MEDICARE

## 2021-03-04 DIAGNOSIS — R80.9 NON-NEPHROTIC RANGE PROTEINURIA: ICD-10-CM

## 2021-03-04 DIAGNOSIS — R74.01 TRANSAMINITIS: ICD-10-CM

## 2021-03-04 PROCEDURE — 76770 US EXAM ABDO BACK WALL COMP: CPT | Performed by: INTERNAL MEDICINE

## 2021-03-04 PROCEDURE — 76705 ECHO EXAM OF ABDOMEN: CPT | Performed by: FAMILY MEDICINE

## 2021-03-04 PROCEDURE — 76981 USE PARENCHYMA: CPT | Performed by: FAMILY MEDICINE

## 2021-03-05 ENCOUNTER — TELEPHONE (OUTPATIENT)
Dept: INTERNAL MEDICINE CLINIC | Facility: CLINIC | Age: 71
End: 2021-03-05

## 2021-03-05 NOTE — TELEPHONE ENCOUNTER
Patients daughter wants discuss kidney and liver results with the nurse or with Dr. Cathy Ag. Also she has question about patients referral. (daughter did not specified with referral) Please call and advise.

## 2021-03-16 ENCOUNTER — OFFICE VISIT (OUTPATIENT)
Dept: NEPHROLOGY | Facility: CLINIC | Age: 71
End: 2021-03-16
Payer: MEDICARE

## 2021-03-16 VITALS
HEART RATE: 100 BPM | DIASTOLIC BLOOD PRESSURE: 77 MMHG | BODY MASS INDEX: 25.42 KG/M2 | WEIGHT: 165.81 LBS | HEIGHT: 67.75 IN | SYSTOLIC BLOOD PRESSURE: 139 MMHG

## 2021-03-16 DIAGNOSIS — R80.9 NON-NEPHROTIC RANGE PROTEINURIA: Primary | ICD-10-CM

## 2021-03-16 PROCEDURE — 99214 OFFICE O/P EST MOD 30 MIN: CPT | Performed by: INTERNAL MEDICINE

## 2021-03-16 RX ORDER — MV-MN/FOLIC ACID/LUTEIN/HRB178 200-175MCG
2 TABLET ORAL DAILY
Status: ON HOLD | COMMUNITY
End: 2021-07-06

## 2021-03-16 RX ORDER — BACITRACIN 500 UNIT/G
1 OINTMENT (GRAM) TOPICAL DAILY
Status: ON HOLD | COMMUNITY
End: 2021-07-06

## 2021-03-16 RX ORDER — PYRIDOXINE HCL (VITAMIN B6) 100 MG
1 TABLET ORAL DAILY
Status: ON HOLD | COMMUNITY
End: 2021-07-06

## 2021-03-16 RX ORDER — UBIDECARENONE/VITAMIN E MIXED 100 MG-100
1 CAPSULE ORAL DAILY
Status: ON HOLD | COMMUNITY
End: 2021-07-06

## 2021-03-16 NOTE — PROGRESS NOTES
Progress Note:      Patient is a 72 yrs old male with pmh of HTN, alcoholism, CKD stage I with proteinuria who presented for follow up      Lab test showed BUN/Cr 12/0.9 mg/dl with an eGFR 85 ml/min. Creatinine was 0.78 mg/dl in 2019.  Na 128 with album Oral Cap Take 1 capsule by mouth daily. • Multiple Vitamins-Minerals (SAMEER MULTIVITAMIN FOR MEN) Oral Tab Take 2 tablets by mouth daily. • Turmeric Curcumin 500 MG Oral Cap Take 1 capsule by mouth daily.      • amLODIPine Besylate-Valsartan  M nephrotic range proteinuria:  -  BUN/Cr 12/0.9 mg/dl with an eGFR 85 ml/min. Creatinine was 0.78 mg/dl in 2019.   - UACR 1.7 in 2019 -> 773. - 24 hrs urine protein 232   - UA +ve protein with hyaline cast. Protein since 2019.   - Aic unremarkable.    - H

## 2021-04-05 ENCOUNTER — TELEPHONE (OUTPATIENT)
Dept: INTERNAL MEDICINE CLINIC | Facility: CLINIC | Age: 71
End: 2021-04-05

## 2021-04-05 ENCOUNTER — HOSPITAL ENCOUNTER (EMERGENCY)
Facility: HOSPITAL | Age: 71
Discharge: HOME | End: 2021-04-05
Attending: EMERGENCY MEDICINE
Payer: MEDICARE

## 2021-04-05 VITALS
SYSTOLIC BLOOD PRESSURE: 144 MMHG | HEART RATE: 97 BPM | RESPIRATION RATE: 19 BRPM | OXYGEN SATURATION: 100 % | TEMPERATURE: 99 F | HEIGHT: 68 IN | WEIGHT: 155 LBS | DIASTOLIC BLOOD PRESSURE: 84 MMHG | BODY MASS INDEX: 23.49 KG/M2

## 2021-04-05 DIAGNOSIS — D50.9 IRON DEFICIENCY ANEMIA, UNSPECIFIED IRON DEFICIENCY ANEMIA TYPE: ICD-10-CM

## 2021-04-05 DIAGNOSIS — Z87.898 HISTORY OF FEVER: Primary | ICD-10-CM

## 2021-04-05 PROCEDURE — 85025 COMPLETE CBC W/AUTO DIFF WBC: CPT | Performed by: EMERGENCY MEDICINE

## 2021-04-05 PROCEDURE — 80048 BASIC METABOLIC PNL TOTAL CA: CPT | Performed by: EMERGENCY MEDICINE

## 2021-04-05 PROCEDURE — 36415 COLL VENOUS BLD VENIPUNCTURE: CPT

## 2021-04-05 PROCEDURE — 80076 HEPATIC FUNCTION PANEL: CPT | Performed by: EMERGENCY MEDICINE

## 2021-04-05 PROCEDURE — 99283 EMERGENCY DEPT VISIT LOW MDM: CPT

## 2021-04-05 PROCEDURE — 81001 URINALYSIS AUTO W/SCOPE: CPT | Performed by: EMERGENCY MEDICINE

## 2021-04-05 RX ORDER — POTASSIUM CHLORIDE 20 MEQ/1
40 TABLET, EXTENDED RELEASE ORAL ONCE
Status: COMPLETED | OUTPATIENT
Start: 2021-04-05 | End: 2021-04-05

## 2021-04-06 NOTE — TELEPHONE ENCOUNTER
Spoke with patient and daughter, states patient starting this am, has not been himself today, seems very tired and has not wanted to eat all day. Endorses drinking fluids. Not confused. Temps >100 all day, improved some with advil.   Most concerning is th

## 2021-04-06 NOTE — ED INITIAL ASSESSMENT (HPI)
Fevers, facial swelling starting this morning. History of liver failure. Has bene taking ibuprofen. advil at 1800. Breathing easy and unlabored.

## 2021-04-06 NOTE — ED PROVIDER NOTES
Patient Seen in: [facility] Emergency Department    History   Patient presents with:  Swelling Edema      HPI    The patient presents to the ED for evaluation with his daughter.   He states that this morning he had a fever that was above 100 and ha with Friends and Family:       Frequency of Social Gatherings with Friends and Family:       Attends Shinto Services:       Active Member of Clubs or Organizations:       Attends Club or Organization Meetings:       Marital Status:   Intimate Partner Vi Abdominal:      General: There is distension. Palpations: Abdomen is soft. Tenderness: There is no abdominal tenderness. Musculoskeletal:         General: No deformity. Cervical back: Neck supple. No rigidity.    Skin:     General: Skin i DRAW LAVENDER   RAINBOW DRAW LIGHT GREEN   RAINBOW DRAW GOLD         Imaging Results Available and Reviewed while in ED: No results found.   ED Medications Administered:   Medications   Potassium Chloride ER (K-DUR M20) CR tab 40 mEq (40 mEq Oral Given 4/5/ precautions were discussed with the patient and/or caregiver.       Condition upon leaving the department: Stable    Disposition and Plan     Clinical Impression:  History of fever  (primary encounter diagnosis)  Iron deficiency anemia, unspecified iron def

## 2021-04-12 ENCOUNTER — OFFICE VISIT (OUTPATIENT)
Dept: SURGERY | Facility: CLINIC | Age: 71
End: 2021-04-12
Payer: MEDICARE

## 2021-04-12 VITALS
BODY MASS INDEX: 24.55 KG/M2 | HEART RATE: 104 BPM | SYSTOLIC BLOOD PRESSURE: 164 MMHG | RESPIRATION RATE: 18 BRPM | WEIGHT: 162 LBS | OXYGEN SATURATION: 99 % | HEIGHT: 68 IN | DIASTOLIC BLOOD PRESSURE: 90 MMHG

## 2021-04-12 DIAGNOSIS — K74.00 LIVER FIBROSIS: Primary | ICD-10-CM

## 2021-04-12 NOTE — PROGRESS NOTES
[facility] at [address], [address] [place] [address] [address]  [address]  [phone] ([phone]) MG Oral Cap, Take 1 capsule by mouth daily. , Disp: , Rfl:   •  Coenzyme Q10 (COQ10) 100 MG Oral Cap, Take 1 capsule by mouth daily. , Disp: , Rfl:   •  Multiple Vitamins-Minerals (SAMEER MULTIVITAMIN FOR MEN) Oral Tab, Take 2 tablets by mouth daily. , Disp: ,

## 2021-04-13 ENCOUNTER — TELEPHONE (OUTPATIENT)
Dept: INTERNAL MEDICINE CLINIC | Facility: CLINIC | Age: 71
End: 2021-04-13

## 2021-04-13 NOTE — TELEPHONE ENCOUNTER
Patient is requesting a refill for the following medication:      amLODIPine Besylate-Valsartan  MG Oral Tab   LOV 01/2021

## 2021-04-14 ENCOUNTER — TELEPHONE (OUTPATIENT)
Dept: SURGERY | Facility: CLINIC | Age: 71
End: 2021-04-14

## 2021-04-14 NOTE — TELEPHONE ENCOUNTER
Pt called stating pt is scheduled for a prostate biopsy on 4-20-21 at 3:00pm.  Pt has question. Can pt drive after the procedure?   Please call

## 2021-04-14 NOTE — TELEPHONE ENCOUNTER
Spoke with patient. PT wondering if he could drive after procedure. Advised patient that he can drive after procedure.

## 2021-04-15 ENCOUNTER — TELEPHONE (OUTPATIENT)
Dept: FAMILY MEDICINE CLINIC | Facility: CLINIC | Age: 71
End: 2021-04-15

## 2021-04-15 DIAGNOSIS — I10 ESSENTIAL HYPERTENSION: ICD-10-CM

## 2021-04-15 RX ORDER — AMLODIPINE AND VALSARTAN 10; 320 MG/1; MG/1
TABLET ORAL
Qty: 90 TABLET | Refills: 0 | OUTPATIENT
Start: 2021-04-15

## 2021-04-15 NOTE — TELEPHONE ENCOUNTER
Called patient, no answer, left message for patient to call back, patient needs to be seen we can provide partial refills until he is seen

## 2021-04-16 NOTE — TELEPHONE ENCOUNTER
Daughter schedule f/u appt with DR. Taty Morel for May 4th and wants to know if doctor can send her father enough medication until then. Please call daughter back if medication is approve by . Thank you.

## 2021-04-16 NOTE — TELEPHONE ENCOUNTER
Patient called again for his refill, patient is aware that his BP medication was denied because he was supposed to follow up in 6 weeks form last appt to monitor his bp.  Patient declined making an appt with Dr. Arben Shen and stated that if Dr. Katharine Ayala does not g

## 2021-04-17 RX ORDER — AMLODIPINE AND VALSARTAN 10; 320 MG/1; MG/1
1 TABLET ORAL DAILY
Qty: 30 TABLET | Refills: 0 | Status: SHIPPED | OUTPATIENT
Start: 2021-04-17 | End: 2021-05-04

## 2021-04-17 NOTE — TELEPHONE ENCOUNTER
You 2 days ago   [facility]     Called patient, no answer, left message for patient to call back, patient needs to be seen we can provide partial refills until he is seen         Documentation    Antoni Yost routed conversation to [place] [id number] Clinical Staff 4 days a

## 2021-04-20 ENCOUNTER — PROCEDURE (OUTPATIENT)
Dept: SURGERY | Facility: CLINIC | Age: 71
End: 2021-04-20
Payer: MEDICARE

## 2021-04-20 VITALS
HEART RATE: 109 BPM | WEIGHT: 162 LBS | SYSTOLIC BLOOD PRESSURE: 160 MMHG | DIASTOLIC BLOOD PRESSURE: 97 MMHG | BODY MASS INDEX: 25 KG/M2

## 2021-04-20 DIAGNOSIS — R97.20 ELEVATED PSA: Primary | ICD-10-CM

## 2021-04-20 PROCEDURE — 76872 US TRANSRECTAL: CPT | Performed by: UROLOGY

## 2021-04-20 PROCEDURE — 55700 BIOPSY OF PROSTATE,NEEDLE/PUNCH: CPT | Performed by: UROLOGY

## 2021-04-20 NOTE — PROGRESS NOTES
Elizabeth Juarez is a 72year old male. HPI:   Patient presents with:  elevated psa: prostate biopsies       61-year-old male presents for transrectal ultrasound and prostate biopsy. He was seen in the office February 23, 2021.   Noted to have an elevated PS prostate biopsy.    Anesthesia: 2% viscous lidocaine gel, 1% lidocaine 7 Ml   Prostate Volume: 23 g  Prostate US abnormalities: None  Seminal Vesicles: Grossly normal  Bladder: Not well seen  Biopsies obtained: 12  Areas biopsied: Right and left apex, mid a

## 2021-04-22 ENCOUNTER — TELEPHONE (OUTPATIENT)
Dept: SURGERY | Facility: CLINIC | Age: 71
End: 2021-04-22

## 2021-04-22 DIAGNOSIS — C61 PROSTATE CANCER (HCC): Primary | ICD-10-CM

## 2021-04-22 NOTE — TELEPHONE ENCOUNTER
Pt's daughter called stating pt had a biopsy on the prostate on 4-20-21. Received results on [employer]. Caller has questions on results.    Call

## 2021-04-23 NOTE — TELEPHONE ENCOUNTER
Urology staff,  I called this patient. I discussed with him the results of his prostate biopsy. This demonstrates high-grade prostate cancer, and intermediate risk prostate cancer in multiple locations. The patient is aware.   Please arrange for him for

## 2021-04-27 NOTE — TELEPHONE ENCOUNTER
S/w Dtr ([address]); pt identified with name & . Needs to have dx Imaging (CT Abd/pelvis; Bone scan; US) completed prior to 21 ENE. Reminded Dtr bone scan still needs to be completed; given Central Scheduling number. Encounter complete.

## 2021-04-27 NOTE — TELEPHONE ENCOUNTER
With [facility] Stefany [place] permission, order for bone scan changed to \"urgent. \"  Encounter complete.

## 2021-04-27 NOTE — TELEPHONE ENCOUNTER
Per pt's daughter returning call, stating bone scan is marked as routine, needing it changed to stat.  Please advise

## 2021-04-30 ENCOUNTER — HOSPITAL ENCOUNTER (OUTPATIENT)
Dept: NUCLEAR MEDICINE | Facility: HOSPITAL | Age: 71
Discharge: HOME | End: 2021-04-30
Attending: UROLOGY
Payer: MEDICARE

## 2021-04-30 ENCOUNTER — HOSPITAL ENCOUNTER (OUTPATIENT)
Dept: CT IMAGING | Facility: HOSPITAL | Age: 71
Discharge: HOME | End: 2021-04-30
Attending: UROLOGY
Payer: MEDICARE

## 2021-04-30 DIAGNOSIS — C61 PROSTATE CANCER (HCC): ICD-10-CM

## 2021-04-30 PROCEDURE — 78306 BONE IMAGING WHOLE BODY: CPT | Performed by: UROLOGY

## 2021-04-30 PROCEDURE — 74178 CT ABD&PLV WO CNTR FLWD CNTR: CPT | Performed by: UROLOGY

## 2021-05-04 ENCOUNTER — OFFICE VISIT (OUTPATIENT)
Dept: SURGERY | Facility: CLINIC | Age: 71
End: 2021-05-04
Payer: MEDICARE

## 2021-05-04 ENCOUNTER — TELEPHONE (OUTPATIENT)
Dept: SURGERY | Facility: CLINIC | Age: 71
End: 2021-05-04

## 2021-05-04 ENCOUNTER — OFFICE VISIT (OUTPATIENT)
Dept: FAMILY MEDICINE CLINIC | Facility: CLINIC | Age: 71
End: 2021-05-04
Payer: MEDICARE

## 2021-05-04 VITALS
HEART RATE: 111 BPM | DIASTOLIC BLOOD PRESSURE: 91 MMHG | WEIGHT: 162 LBS | BODY MASS INDEX: 25 KG/M2 | SYSTOLIC BLOOD PRESSURE: 146 MMHG

## 2021-05-04 VITALS
SYSTOLIC BLOOD PRESSURE: 124 MMHG | BODY MASS INDEX: 24 KG/M2 | OXYGEN SATURATION: 99 % | WEIGHT: 159 LBS | HEART RATE: 113 BPM | DIASTOLIC BLOOD PRESSURE: 70 MMHG

## 2021-05-04 DIAGNOSIS — C61 PROSTATE CANCER (HCC): Primary | ICD-10-CM

## 2021-05-04 DIAGNOSIS — Z12.11 ENCOUNTER FOR SCREENING COLONOSCOPY: ICD-10-CM

## 2021-05-04 DIAGNOSIS — M89.9 BONE LESION: ICD-10-CM

## 2021-05-04 DIAGNOSIS — E87.6 HYPOKALEMIA: ICD-10-CM

## 2021-05-04 DIAGNOSIS — R16.1 SPLENOMEGALY: ICD-10-CM

## 2021-05-04 DIAGNOSIS — C61 PROSTATE CANCER (HCC): ICD-10-CM

## 2021-05-04 DIAGNOSIS — I10 ESSENTIAL HYPERTENSION: ICD-10-CM

## 2021-05-04 DIAGNOSIS — D64.9 ANEMIA, UNSPECIFIED TYPE: Primary | ICD-10-CM

## 2021-05-04 DIAGNOSIS — K76.0 HEPATIC STEATOSIS: ICD-10-CM

## 2021-05-04 PROCEDURE — 99215 OFFICE O/P EST HI 40 MIN: CPT | Performed by: UROLOGY

## 2021-05-04 PROCEDURE — 99215 OFFICE O/P EST HI 40 MIN: CPT | Performed by: INTERNAL MEDICINE

## 2021-05-04 RX ORDER — AMLODIPINE AND VALSARTAN 10; 320 MG/1; MG/1
1 TABLET ORAL DAILY
Qty: 90 TABLET | Refills: 1 | Status: SHIPPED | OUTPATIENT
Start: 2021-05-04 | End: 2021-06-21

## 2021-05-04 NOTE — TELEPHONE ENCOUNTER
Pt scheduling a nurse visit for [employer] [facility]) per Dr. GONZALEZ [facility]. Pt has medicare.

## 2021-05-04 NOTE — PROGRESS NOTES
Lisa Harvey is a 72year old male. HPI:   Patient presents with:   Follow - Up: patient presents for f/u on prostate bx results       56-year-old male accompanied by his wife daughter in follow-up to a transrectal ultrasound and prostate biopsy April 20, Pickens score 4+5=9 (Grade Group 5), involving two out of two tissue cores and approximately 65% of entire specimen.    · Perineural invasion is not identified.     For  purposes, this case was reviewed by a second pathologist who concurred Diana Hale 34 KIDNEY/BLADDER (XGB=[id number]), 3/04/2021,   8:55 AM.       INDICATIONS: Prostate cancer with extensive disease, including cores with [place] 4 + 5, [place] 3 + 5, [place] 4+3, and [place] 3+3 disease.  Most recent PSA of 10.40 ng statistically likely represent cysts. No suspicious enhancing renal lesions are evident. Renal cortical scarring is suggested at the upper pole of the   left kidney. GI/MESENTERY: A small hiatal hernia is evident.  There is no evidence of bowel obstructio Dictated by (CST): Nhi Tabares MD on 4/30/2021 at 3:23 PM       Finalized by (CST): Nhi Tabares MD on 4/30/2021 at 3:47 PM                     HISTORY:  Past Medical History:   Diagnosis Date   • Essential hypertension       No past surgical hist and possible side effects thereof. The patient was told this would involve periodic PSA testing and digital rectal exam as well as possible repeat prostate biopsy to confirm the original pathology findings.   Drawback of this approach would be a small but not likely to shed any additional lights. If the lesion appears to be suspicious on the CT scan he might need to get an image guided biopsy to confirm metastatic disease.   I also recommended he make an appointment with a medical oncologist given high risk

## 2021-05-05 ENCOUNTER — PATIENT MESSAGE (OUTPATIENT)
Dept: SURGERY | Facility: CLINIC | Age: 71
End: 2021-05-05

## 2021-05-05 NOTE — TELEPHONE ENCOUNTER
From: Ajay Tellez  To: Jorge Sharpe MD  Sent: 5/5/2021 10:08 AM CDT  Subject: Visit Moreno Gunderson, this is [address] (patient's daughter).  Could you clarify who my father should meet with after he has the CT scan of his sternum for the resul

## 2021-05-06 NOTE — TELEPHONE ENCOUNTER
Patient's daughter contacted. Patient's daughter is asking about patient's CT chest order.       Informed patient's daughter that Dr. Ashley Womack ordered the test, therefore he will be getting the test results and our office will contact her or her father (the p

## 2021-05-11 ENCOUNTER — HOSPITAL ENCOUNTER (OUTPATIENT)
Dept: CT IMAGING | Facility: HOSPITAL | Age: 71
Discharge: HOME | End: 2021-05-11
Attending: UROLOGY
Payer: MEDICARE

## 2021-05-11 DIAGNOSIS — C61 PROSTATE CANCER (HCC): ICD-10-CM

## 2021-05-11 PROCEDURE — 82565 ASSAY OF CREATININE: CPT

## 2021-05-11 PROCEDURE — 71260 CT THORAX DX C+: CPT | Performed by: UROLOGY

## 2021-05-12 ENCOUNTER — TELEPHONE (OUTPATIENT)
Dept: SURGERY | Facility: CLINIC | Age: 71
End: 2021-05-12

## 2021-05-12 ENCOUNTER — LAB ENCOUNTER (OUTPATIENT)
Dept: LAB | Facility: HOSPITAL | Age: 71
End: 2021-05-12
Attending: INTERNAL MEDICINE
Payer: MEDICARE

## 2021-05-12 ENCOUNTER — NURSE ONLY (OUTPATIENT)
Dept: SURGERY | Facility: CLINIC | Age: 71
End: 2021-05-12
Payer: MEDICARE

## 2021-05-12 DIAGNOSIS — D64.9 ANEMIA, UNSPECIFIED TYPE: ICD-10-CM

## 2021-05-12 DIAGNOSIS — C61 PROSTATE CANCER (HCC): Primary | ICD-10-CM

## 2021-05-12 DIAGNOSIS — E87.6 HYPOKALEMIA: ICD-10-CM

## 2021-05-12 PROCEDURE — 96402 CHEMO HORMON ANTINEOPL SQ/IM: CPT | Performed by: UROLOGY

## 2021-05-12 PROCEDURE — 83540 ASSAY OF IRON: CPT

## 2021-05-12 PROCEDURE — 80053 COMPREHEN METABOLIC PANEL: CPT

## 2021-05-12 PROCEDURE — 82728 ASSAY OF FERRITIN: CPT

## 2021-05-12 PROCEDURE — 84466 ASSAY OF TRANSFERRIN: CPT

## 2021-05-12 PROCEDURE — 85025 COMPLETE CBC W/AUTO DIFF WBC: CPT | Performed by: INTERNAL MEDICINE

## 2021-05-12 PROCEDURE — 36415 COLL VENOUS BLD VENIPUNCTURE: CPT

## 2021-05-12 NOTE — PROGRESS NOTES
I was asked by MARY to administer this pt a Firmagon 240 mg SQ, injection today for the tx of his prostate cancer. I obtained the med from the cabinet and determined that there was no PA required for pt's insurance.  I went into the exam room with the med an

## 2021-05-13 ENCOUNTER — TELEPHONE (OUTPATIENT)
Dept: SURGERY | Facility: CLINIC | Age: 71
End: 2021-05-13

## 2021-05-13 NOTE — TELEPHONE ENCOUNTER
S/W pt and determined that he had the Firmagon injections yesterday 120 mg in each arm and he woke up with extreme pain in both arms at level 10 at 9:30 pm. Pt took 2 tylenol  And another 2 Tylenol 3 hrs later and pain was finally relieved to level 5.  I as

## 2021-05-13 NOTE — TELEPHONE ENCOUNTER
Patient was called to schedule follow up appointment and stated he received a firmagon injection yesterday and is in extreme pain in both of his arms would like a call back to discuss

## 2021-05-15 NOTE — PROGRESS NOTES
[facility]  Return Patient Progress Note      HPI:   Patient presents with:  Hypertension      Kaitlynn Evans is a 72year old male presenting for:    Has a significant  has a past medical history of Essential hypertension.     Prostate 0.00 - 1.00 x10(3) uL    Neutrophil % 63.7 %    Lymphocyte % 21.2 %    Monocyte % 12.8 %    Eosinophil % 1.7 %    Basophil % 0.4 %    Immature Granulocyte % 0.2 %       Labs:   CMP:  Lab Results   Component Value Date/Time     (L) 05/12/2021 03:29 PM tobacco: Never Used    Vaping Use      Vaping Use: Never used    Alcohol use: Yes      Comment: 2 drinks of whisky daily    Drug use: Never     Family History:  Family History   Problem Relation Age of Onset   • Other (esophageal cancer) Father    • Cancer Last 3 Encounters:  05/04/21 : 159 lb (72.1 kg)  05/04/21 : 162 lb (73.5 kg)  04/20/21 : 162 lb (73.5 kg)      Physical Exam  Vitals reviewed. Constitutional:       General: He is not in acute distress. Appearance: He is well-developed.    HENT: cause of his anemia      (Z12.11) Encounter for screening colonoscopy  Plan: GASTRO - INTERNAL         (E87.6) Hypokalemia  Plan: COMP METABOLIC PANEL (14)      (I10) Essential hypertension  Plan: amLODIPine Besylate-Valsartan  MG Oral         Tab plan.  Reasurrance and education provided. All questions answered. Notified to call with any questions, complications, allergies, or worsening or changing symptoms as well as any side effects or complications from the treatments .   Red flags/ ER precautio

## 2021-05-19 ENCOUNTER — TELEPHONE (OUTPATIENT)
Dept: GASTROENTEROLOGY | Facility: CLINIC | Age: 71
End: 2021-05-19

## 2021-05-19 ENCOUNTER — OFFICE VISIT (OUTPATIENT)
Dept: RADIATION ONCOLOGY | Facility: HOSPITAL | Age: 71
End: 2021-05-19
Attending: INTERNAL MEDICINE
Payer: MEDICARE

## 2021-05-19 ENCOUNTER — OFFICE VISIT (OUTPATIENT)
Dept: HEMATOLOGY/ONCOLOGY | Facility: HOSPITAL | Age: 71
End: 2021-05-19
Attending: INTERNAL MEDICINE
Payer: MEDICARE

## 2021-05-19 VITALS
RESPIRATION RATE: 16 BRPM | WEIGHT: 162.63 LBS | DIASTOLIC BLOOD PRESSURE: 81 MMHG | BODY MASS INDEX: 24.65 KG/M2 | TEMPERATURE: 97 F | HEART RATE: 99 BPM | HEIGHT: 68 IN | SYSTOLIC BLOOD PRESSURE: 154 MMHG

## 2021-05-19 VITALS
SYSTOLIC BLOOD PRESSURE: 154 MMHG | DIASTOLIC BLOOD PRESSURE: 81 MMHG | HEART RATE: 99 BPM | BODY MASS INDEX: 24.65 KG/M2 | RESPIRATION RATE: 16 BRPM | TEMPERATURE: 97 F | WEIGHT: 162.63 LBS | HEIGHT: 68 IN

## 2021-05-19 DIAGNOSIS — C61 PROSTATE CANCER (HCC): Primary | ICD-10-CM

## 2021-05-19 DIAGNOSIS — D50.9 IRON DEFICIENCY ANEMIA, UNSPECIFIED IRON DEFICIENCY ANEMIA TYPE: ICD-10-CM

## 2021-05-19 PROBLEM — D64.9 ANEMIA: Status: ACTIVE | Noted: 2021-05-19

## 2021-05-19 PROBLEM — E61.1 IRON DEFICIENCY: Status: ACTIVE | Noted: 2021-05-19

## 2021-05-19 PROCEDURE — 99212 OFFICE O/P EST SF 10 MIN: CPT

## 2021-05-19 PROCEDURE — 99205 OFFICE O/P NEW HI 60 MIN: CPT | Performed by: INTERNAL MEDICINE

## 2021-05-19 NOTE — PATIENT INSTRUCTIONS
Please call and schedule MRI pelvis. Dr Cutler Prime to call with results. Please call nursing for any questions or concerns @ [phone]. We will call you to schedule planning scan once the MRI results are known.

## 2021-05-19 NOTE — PROGRESS NOTES
Nursing Consultation Note  Patient: Beatris Darling  YOB: 1950  Age: 72year old  Radiation Oncologist: Dr. Frankie Leos  Referring Physician: [email or web address] [email or web address]  Consult Date: 5/19/2021      Chemotherapy: No  Labs: Reviewed MG Oral Cap Take 1 capsule by mouth daily. • Coenzyme Q10 (COQ10) 100 MG Oral Cap Take 1 capsule by mouth daily. • Multiple Vitamins-Minerals (SAMEER MULTIVITAMIN FOR MEN) Oral Tab Take 2 tablets by mouth daily.      • Turmeric Curcumin 500 MG Oral Ca Feeling of Stress :   Social Connections:       Frequency of Communication with Friends and Family:       Frequency of Social Gatherings with Friends and Family:       Attends Yarsani Services:       Active Member of Clubs or Organizations:       Attends excellent  Readiness to learn:  Not ready  Learning preferences:  Discussion, Written and Handout  Barriers to learning:  Denial and Motivation  Interventions to reduce barriers:  Consult, Face patient when speaking, Provide support/encouragement, Provide

## 2021-05-19 NOTE — TELEPHONE ENCOUNTER
Gi staff:    Please reach out to this patient to offer appointment with me at 10:30 AM Friday 5/28 at [facility].     Thanks    Skuumar Medina MD  [address] - Gastroenterology  5/19/2021  4:45 PM

## 2021-05-20 NOTE — TELEPHONE ENCOUNTER
Patients daughter Shellie Aguila confirmed appointment on Friday 5/28 10:30 am,provided location, ok to schedule, thanks.

## 2021-05-20 NOTE — TELEPHONE ENCOUNTER
Noted, patient scheduled.     Your appointments     Date & Time Appointment Department [facility])    May 28, 2021 10:30 AM CDT Consult with Dara Pérez MD [address] [place] [address] ([address] [place] [address])

## 2021-05-20 NOTE — TELEPHONE ENCOUNTER
Called annabella Gonzalez and left message if patient is able to make appt on 5/28 at 10:30AM wmob. Please confirm if patient is able to make appt on Friday 5/28 at 10:30AM wmob. Spot will need to be opened.

## 2021-05-20 NOTE — CONSULTS
[facility]    PATIENT'S NAME: Roshan CALDERON   CONSULTING PHYSICIAN: Herberth Jeter.  Carson Orellana MD   PATIENT ACCOUNT #: [de-identified] LOCATION: [address] [place] [address] RECORD #: [id number] YOB: 1950   CONSULTATION DATE: 05/19/2021       [facility] previously had heavier drinking in the past.  He denies any tobacco or illicit drug use. FAMILY MEDICAL HISTORY:  There is a known family history of BRCA1 and breast cancer. He states, however, some of his family history is unknown.   Specifically, his radiation in the coming months. We will help arrange for this. He does have also severe iron deficiency. We will set him up to receive IV iron. We will contact Gastroenterology for further endoscopic evaluation.   He does have baseline alcoholic live

## 2021-05-28 ENCOUNTER — OFFICE VISIT (OUTPATIENT)
Dept: GASTROENTEROLOGY | Facility: CLINIC | Age: 71
End: 2021-05-28
Payer: MEDICARE

## 2021-05-28 ENCOUNTER — TELEPHONE (OUTPATIENT)
Dept: GASTROENTEROLOGY | Facility: CLINIC | Age: 71
End: 2021-05-28

## 2021-05-28 VITALS
BODY MASS INDEX: 24.25 KG/M2 | HEIGHT: 68 IN | HEART RATE: 132 BPM | WEIGHT: 160 LBS | SYSTOLIC BLOOD PRESSURE: 150 MMHG | DIASTOLIC BLOOD PRESSURE: 88 MMHG

## 2021-05-28 DIAGNOSIS — D50.9 IRON DEFICIENCY ANEMIA, UNSPECIFIED IRON DEFICIENCY ANEMIA TYPE: Primary | ICD-10-CM

## 2021-05-28 DIAGNOSIS — E61.1 IRON DEFICIENCY: Primary | ICD-10-CM

## 2021-05-28 PROCEDURE — 99204 OFFICE O/P NEW MOD 45 MIN: CPT | Performed by: INTERNAL MEDICINE

## 2021-05-28 RX ORDER — POLYETHYLENE GLYCOL 3350, SODIUM CHLORIDE, SODIUM BICARBONATE, POTASSIUM CHLORIDE 420; 11.2; 5.72; 1.48 G/4L; G/4L; G/4L; G/4L
POWDER, FOR SOLUTION ORAL
Qty: 4000 ML | Refills: 0 | Status: SHIPPED | OUTPATIENT
Start: 2021-05-28 | End: 2021-06-11 | Stop reason: ALTCHOICE

## 2021-05-28 NOTE — TELEPHONE ENCOUNTER
Scheduled for:  Colonoscopy [address]; EGD [address]  Provider Name:  Dr Shan Arechiga  Date:  06/02/2021  Location:  [facility]  Sedation:  MAC  Time:  9:45am (pt is aware that [facility] will call the day before to confirm arrival time)  Prep:  Colyte  Meds/Allergies Reconciled?:

## 2021-05-28 NOTE — H&P
[facility], [facility] - Gastroenterology                                                                                                  Clinic History and Physical Oral Cap Take 1 capsule by mouth daily. • Cranberry 500 MG Oral Cap Take 1 capsule by mouth daily. • Coenzyme Q10 (COQ10) 100 MG Oral Cap Take 1 capsule by mouth daily.      • Multiple Vitamins-Minerals (SAMEER MULTIVITAMIN FOR MEN) Oral Tab Take 2 ta well as the cardiopulmonary risks of anesthesia all leading to prolonged hospital stay or surgical intervention. All questions were answered to the patient’s satisfaction.  The patient elected to proceed with EGD with intervention [i.e. Biopsy, dilatation,

## 2021-05-28 NOTE — PATIENT INSTRUCTIONS
1. Schedule EGD and colonoscopy with monitored anesthesia care (MAC) with Dr. Mary Ceja at the [facility] or the [facility]    2.  bowel prep from pharmacy ([employer] ).  As we discussed it is important to take the bowel preparation in two parts taking 2

## 2021-05-30 ENCOUNTER — LAB REQUISITION (OUTPATIENT)
Dept: LAB | Facility: HOSPITAL | Age: 71
End: 2021-05-30
Payer: MEDICARE

## 2021-05-30 DIAGNOSIS — Z01.818 ENCOUNTER FOR OTHER PREPROCEDURAL EXAMINATION: ICD-10-CM

## 2021-05-31 ENCOUNTER — PATIENT MESSAGE (OUTPATIENT)
Dept: GASTROENTEROLOGY | Facility: CLINIC | Age: 71
End: 2021-05-31

## 2021-06-01 ENCOUNTER — TELEPHONE (OUTPATIENT)
Dept: GASTROENTEROLOGY | Facility: CLINIC | Age: 71
End: 2021-06-01

## 2021-06-01 NOTE — TELEPHONE ENCOUNTER
Ok to reschedule to next week with miralax/gatrade prep    Thanks    Erick Liu MD  [facility], [facility] - Gastroenterology  6/1/2021  2:22 PM

## 2021-06-01 NOTE — TELEPHONE ENCOUNTER
I spoke with this patients daughter Kennon Curling ([phone]) which she was upset that she did not see the procedure in [facility] which I informed her that she will not since [place] [address] is not an [address] [place] [address] but the procedure was scheduled and the [place] [address] will call to

## 2021-06-01 NOTE — TELEPHONE ENCOUNTER
To: Coleen Mueller      From: Ajay Santiago      Created: 6/1/2021 11:44 AM        Telly Desai,     Unfortunately, we are not sure why this information does not come up in the upcoming visits section but our office does not have access to change that.  We did rec

## 2021-06-01 NOTE — TELEPHONE ENCOUNTER
From: Tim How  To: Janelle Isaac MD  Sent: 5/31/2021 10:45 PM CDT  Subject: Visit Follow-up Question    How come we don’t see the endoscopy/colonoscopy scheduled for this Wednesday (the 2nd) in the ‘upcoming visits’ section?  Want to make sure we h

## 2021-06-01 NOTE — TELEPHONE ENCOUNTER
Dr. Cady Multani--    This patient could not get his Prep from the pharmacy which we had to reschedule his procedure until next week. Please advise if he can drink Miralax/Gatorade prep.  Thank you

## 2021-06-01 NOTE — CONSULTS
[facility]    PATIENT'S NAME: Aminta Cabot   RADIATION ONCOLOGIST: Marna Fleischer.  Meena Butler MD   PATIENT ACCOUNT #: [de-identified] LOCATION: [address] [place] [address] RECORD #: [id number] YOB: 1950   CONSULTATION DATE: 05/19/2021       RADIATI referred to both Radiation and Medical Oncology, and has been started on androgen deprivation therapy. Of note is that the patient's family history is significant for BRCA positivity.   He will be discussing this further with Medical Oncology in the futu Clear to auscultation bilaterally. HEART:  Regular rate and rhythm. Normal S1, S2 with no audible murmurs. LYMPHATICS:  No supraclavicular, axillary, or inguinal lymphadenopathy.    ABDOMEN:  Soft, nontender, and nondistended with normoactive bowel so has never had a colonoscopy to this point. If he does have some bleeding, this should be found out and worked up before beginning his radiation.   I would not hold radiation too long if a colonoscopy is unable to be performed, but ideally, this would be do addressed. I will discuss the case further with Dr. Vincent Carter regarding input he may have regarding other possible systemic options and genetic testing. Thank you very much for allowing me the opportunity to participate in the care of this patient.   If ther

## 2021-06-04 ENCOUNTER — OFFICE VISIT (OUTPATIENT)
Dept: HEMATOLOGY/ONCOLOGY | Facility: HOSPITAL | Age: 71
End: 2021-06-04
Attending: INTERNAL MEDICINE
Payer: MEDICARE

## 2021-06-04 VITALS
SYSTOLIC BLOOD PRESSURE: 154 MMHG | OXYGEN SATURATION: 99 % | RESPIRATION RATE: 16 BRPM | DIASTOLIC BLOOD PRESSURE: 82 MMHG | HEART RATE: 95 BPM | TEMPERATURE: 98 F

## 2021-06-04 DIAGNOSIS — E61.1 IRON DEFICIENCY: ICD-10-CM

## 2021-06-04 DIAGNOSIS — C61 PROSTATE CANCER (HCC): ICD-10-CM

## 2021-06-04 DIAGNOSIS — D50.0 IRON DEFICIENCY ANEMIA DUE TO CHRONIC BLOOD LOSS: Primary | ICD-10-CM

## 2021-06-04 PROCEDURE — 96376 TX/PRO/DX INJ SAME DRUG ADON: CPT

## 2021-06-04 PROCEDURE — 96365 THER/PROPH/DIAG IV INF INIT: CPT

## 2021-06-04 NOTE — PROGRESS NOTES
Ronnie Mon arrives to the infusion area for his first iron dextran infusion. Labs on5/12/21 showed iron of 18 and sat 4%. Last HGB 8.8  He does c/o fatigue. He has a gastroscopy and colonoscopy scheduled for next week.    Procedure for iron dextran explained to

## 2021-06-06 ENCOUNTER — LAB REQUISITION (OUTPATIENT)
Dept: LAB | Facility: HOSPITAL | Age: 71
End: 2021-06-06
Payer: MEDICARE

## 2021-06-06 DIAGNOSIS — Z01.818 ENCOUNTER FOR OTHER PREPROCEDURAL EXAMINATION: ICD-10-CM

## 2021-06-09 ENCOUNTER — LAB REQUISITION (OUTPATIENT)
Dept: LAB | Facility: HOSPITAL | Age: 71
End: 2021-06-09
Payer: MEDICARE

## 2021-06-09 ENCOUNTER — APPOINTMENT (OUTPATIENT)
Dept: HEMATOLOGY/ONCOLOGY | Facility: HOSPITAL | Age: 71
End: 2021-06-09
Payer: MEDICARE

## 2021-06-09 ENCOUNTER — SURGERY CENTER DOCUMENTATION (OUTPATIENT)
Dept: SURGERY | Age: 71
End: 2021-06-09

## 2021-06-09 DIAGNOSIS — E61.1 IRON DEFICIENCY: ICD-10-CM

## 2021-06-09 DIAGNOSIS — D50.9 IRON DEFICIENCY ANEMIA, UNSPECIFIED: ICD-10-CM

## 2021-06-09 PROCEDURE — 45385 COLONOSCOPY W/LESION REMOVAL: CPT | Performed by: INTERNAL MEDICINE

## 2021-06-09 PROCEDURE — 88312 SPECIAL STAINS GROUP 1: CPT | Performed by: INTERNAL MEDICINE

## 2021-06-09 PROCEDURE — 45380 COLONOSCOPY AND BIOPSY: CPT | Performed by: INTERNAL MEDICINE

## 2021-06-09 PROCEDURE — 43239 EGD BIOPSY SINGLE/MULTIPLE: CPT | Performed by: INTERNAL MEDICINE

## 2021-06-09 PROCEDURE — 88305 TISSUE EXAM BY PATHOLOGIST: CPT | Performed by: INTERNAL MEDICINE

## 2021-06-09 NOTE — PROCEDURES
Esophagogastroduodenoscopy (EGD) & Colonoscopy Report    Tim Soto    CONCHITA 1950 Age 72year old   PCP Umesh Negro MD Endoscopist Janelle Isaac MD     Date of procedure: 21    Procedure: EGD w/ biopsy & Colonoscopy w/ biopsy a insufflation technique (only Co2 was used for insufflation). The cecum was identified by localizing the trifold, the appendix and the ileocecal valve. Withdrawal was begun with thorough washing and careful examination of the colonic walls and folds.  The as cm pedunculated polyp at 25 cm. Two clips were placed at the stalk base and then the polyp was removed by snare cautery polypectomy and retrieved with a net.  There was a distal sigmoid polyp semi-pedunculated or short stalked which was removed by snare cau

## 2021-06-10 ENCOUNTER — TELEPHONE (OUTPATIENT)
Dept: GASTROENTEROLOGY | Facility: CLINIC | Age: 71
End: 2021-06-10

## 2021-06-10 NOTE — TELEPHONE ENCOUNTER
I contacted patient's daughter, on verbal release. She was requesting results. I told her she can find the pathology results in [address] I can send the operative report to her as well.      She is meeting with Dr. Karen Santiago tomorrow and wants to make sure she issa

## 2021-06-10 NOTE — TELEPHONE ENCOUNTER
Dr. Arely Dahl,    Patient called to discuss colonoscopy results. Please advise when available, thank you.

## 2021-06-10 NOTE — TELEPHONE ENCOUNTER
Spoke to patient and his spouse around 4:15 today over the phone. Discussed pathology results confirming that the rectosigmoid mass is a cancer. We discussed the other biopsies from the stomach and duodenum being unremarkable.  Discussed the polyps which

## 2021-06-11 ENCOUNTER — NURSE ONLY (OUTPATIENT)
Dept: HEMATOLOGY/ONCOLOGY | Facility: HOSPITAL | Age: 71
End: 2021-06-11
Attending: INTERNAL MEDICINE
Payer: MEDICARE

## 2021-06-11 VITALS
SYSTOLIC BLOOD PRESSURE: 164 MMHG | RESPIRATION RATE: 18 BRPM | BODY MASS INDEX: 24.1 KG/M2 | WEIGHT: 159 LBS | HEART RATE: 97 BPM | OXYGEN SATURATION: 100 % | HEIGHT: 68 IN | DIASTOLIC BLOOD PRESSURE: 79 MMHG | TEMPERATURE: 98 F

## 2021-06-11 DIAGNOSIS — C61 PROSTATE CANCER (HCC): Primary | ICD-10-CM

## 2021-06-11 DIAGNOSIS — D50.0 IRON DEFICIENCY ANEMIA DUE TO CHRONIC BLOOD LOSS: ICD-10-CM

## 2021-06-11 DIAGNOSIS — C19 RECTOSIGMOID CANCER (HCC): ICD-10-CM

## 2021-06-11 DIAGNOSIS — E61.1 IRON DEFICIENCY: ICD-10-CM

## 2021-06-11 DIAGNOSIS — D50.9 IRON DEFICIENCY ANEMIA, UNSPECIFIED IRON DEFICIENCY ANEMIA TYPE: ICD-10-CM

## 2021-06-11 PROCEDURE — 36415 COLL VENOUS BLD VENIPUNCTURE: CPT

## 2021-06-11 PROCEDURE — 84153 ASSAY OF PSA TOTAL: CPT

## 2021-06-11 PROCEDURE — 99215 OFFICE O/P EST HI 40 MIN: CPT | Performed by: INTERNAL MEDICINE

## 2021-06-11 PROCEDURE — 96402 CHEMO HORMON ANTINEOPL SQ/IM: CPT

## 2021-06-11 NOTE — PROGRESS NOTES
Cancer Center Progress Note    Patient Name: Angela Bustillo   YOB: 1950   Medical Record Number: [id number]   Attending Physician: Rory Jimenez M.D.      Chief Complaint:  State cancer rectosigmoid cancer    History of Present Illness:  Cancer hi       Spouse name: Not on file      Number of children: Not on file      Years of education: Not on file      Highest education level: Not on file    Tobacco Use      Smoking status: Never Smoker      Smokeless tobacco: Never Used    Vaping Use daily., Disp: , Rfl:   Turmeric Curcumin 500 MG Oral Cap, Take 1 capsule by mouth daily. , Disp: , Rfl:     [COMPLETED] leuprolide (LUPRON 3 MONTH) IM depot injection 22.5 mg, 22.5 mg, Intramuscular, Once, Nick Crowe MD, 22.5 mg at 06/11/21 3459  [COMPL 05/12/2021    CO2 26.0 05/12/2021       Radiology:  none    Cancer Staging  No matching staging information was found for the patient.     Impression and Plan:  52-year-old male with family history of BRCA1 germline mutation, alcoholic liver disease, severe

## 2021-06-11 NOTE — PROGRESS NOTES
Carmen Lowery is here for his first Q3 month Lupron inj  Had firmagon 240mg 1 month ago  Reports intermittent, mild hot flashes since that injection  Otherwise doing well, & in good spirits    Provided and reviewed [facility] Lupron handout together, he voiced

## 2021-06-15 ENCOUNTER — OFFICE VISIT (OUTPATIENT)
Dept: SURGERY | Facility: CLINIC | Age: 71
End: 2021-06-15
Payer: MEDICARE

## 2021-06-15 VITALS
DIASTOLIC BLOOD PRESSURE: 71 MMHG | HEART RATE: 73 BPM | WEIGHT: 159 LBS | HEIGHT: 68 IN | SYSTOLIC BLOOD PRESSURE: 111 MMHG | BODY MASS INDEX: 24.1 KG/M2

## 2021-06-15 DIAGNOSIS — C61 PROSTATE CANCER (HCC): Primary | ICD-10-CM

## 2021-06-15 PROCEDURE — 99213 OFFICE O/P EST LOW 20 MIN: CPT | Performed by: UROLOGY

## 2021-06-15 NOTE — PROGRESS NOTES
Irasema Mancilla is a 72year old male. HPI:   Patient presents with: Follow - Up: Pt presents for follow up.  Pt states that he is following up to discuss his prostate cancer treatment therapy based on recent findings with another provider during a colonosc • Saw Palmetto 160 MG Oral Cap Take 1 capsule by mouth daily. • Cranberry 500 MG Oral Cap Take 1 capsule by mouth daily. • Coenzyme Q10 (COQ10) 100 MG Oral Cap Take 1 capsule by mouth daily.      • Multiple Vitamins-Minerals (SAMEER MULTIVITAMIN FOR

## 2021-06-16 ENCOUNTER — OFFICE VISIT (OUTPATIENT)
Dept: SURGERY | Facility: CLINIC | Age: 71
End: 2021-06-16
Payer: MEDICARE

## 2021-06-16 VITALS
HEIGHT: 68 IN | RESPIRATION RATE: 16 BRPM | BODY MASS INDEX: 24.1 KG/M2 | DIASTOLIC BLOOD PRESSURE: 82 MMHG | SYSTOLIC BLOOD PRESSURE: 146 MMHG | OXYGEN SATURATION: 100 % | WEIGHT: 159 LBS | HEART RATE: 98 BPM

## 2021-06-16 DIAGNOSIS — C20 ADENOCARCINOMA OF RECTUM (HCC): Primary | ICD-10-CM

## 2021-06-16 PROCEDURE — 99205 OFFICE O/P NEW HI 60 MIN: CPT | Performed by: SURGERY

## 2021-06-16 RX ORDER — NEOMYCIN SULFATE 500 MG/1
TABLET ORAL
Qty: 6 TABLET | Refills: 0 | Status: ON HOLD | OUTPATIENT
Start: 2021-06-16 | End: 2021-07-05

## 2021-06-16 RX ORDER — METRONIDAZOLE 500 MG/1
TABLET ORAL
Qty: 3 TABLET | Refills: 0 | Status: ON HOLD | OUTPATIENT
Start: 2021-06-16 | End: 2021-07-05

## 2021-06-16 NOTE — H&P (VIEW-ONLY)
[facility]    Patient Name:  Maria Isabel Boss   YOB: 1950   Gender:  Male   Appt Date:  6/16/2021   Provider:  Jayden Julian MD   Insurance:  [address] [place] [address] Provid MRI prostate and MRI pelvis scheduled for next week. He denies weight loss, fatigue, irregular bowel movements.       Vital Signs:  /82 (BP Location: Left arm, Patient Position: Sitting, Cuff Size: adult)   Pulse 98   Resp 16   Ht 1.727 m (5' 8\")   W Never Used    Vaping Use      Vaping Use: Never used    Substance and Sexual Activity      Alcohol use: Yes        Comment: 2 drinks of whisky daily      Drug use: Never    Other Topics      Concerns:     Reviewed:  Family History   Problem Relation Age of W+WO) 4/30/2021:  FINDINGS:   LUNG BASES: The heart is normal in size. Hypodensity of the intracardiac blood pool relative to the myocardium may relate to underlying anemia. Atherosclerotic vascular calcifications are present in the coronary vessels.  There   Trace atherosclerotic vascular calcifications of the abdominal aorta are observed. No aneurysm is detected. RETROPERITONEUM: No mass or lymphadenopathy is apparent.    BONES:   There are subacute-appearing fractures of the left posterior 11th and 12th r CHEST WALL: No axillary mass or lymphadenopathy.     LIMITED ABDOMEN: Nonspecific low density of the hepatic parenchyma may represent underlying hepatic steatosis.  Partially imaged probable small left upper pole renal cyst.   BONES: Subacute-appearing min appropriate control reactivity).     B. Stomach; biopsy:  · Fragments of gastric mucosa demonstrating mild chronic inactive gastritis. · No evidence of intestinal metaplasia, epithelial dysplasia, or malignancy identified.    · No evidence of bacterial o adenocarcinoma (0.3 cm in maximum dimension).     I. Rectosigmoid colon mass; biopsy:  · Fragments of infiltrating moderately differentiated adenocarcinoma present in a background of tubulovillous adenoma with severe/high-grade dysplasia.     Comment:   Fo

## 2021-06-16 NOTE — PATIENT INSTRUCTIONS
Surgery: XI Robot-assisted low anterior resection, possible open, flexible sigmoidoscopy    Date of Surgery: 7/2    Hosptial:    [address] [address] [place], [address]  Phone: [phone] [facility] second Ensure Pre-Surgery drink(4hrs prior to surgery time), unless instructed otherwise by your surgeon. · Bring your picture ID and insurance card with you. · Wear comfortable clothing that can easily be removed.  Preferably, something that zips, sna

## 2021-06-16 NOTE — CONSULTS
[facility]    Patient Name:  Kaitlynn Evans   YOB: 1950   Gender:  Male   Appt Date:  6/16/2021   Provider:  Latha Whitmore MD   Insurance:  [address] [place] [address]  Referring Provid MRI prostate and MRI pelvis scheduled for next week. He denies weight loss, fatigue, irregular bowel movements.       Vital Signs:  /82 (BP Location: Left arm, Patient Position: Sitting, Cuff Size: adult)   Pulse 98   Resp 16   Ht 1.727 m (5' 8\")   W Never Used    Vaping Use      Vaping Use: Never used    Substance and Sexual Activity      Alcohol use: Yes        Comment: 2 drinks of whisky daily      Drug use: Never    Other Topics      Concerns:     Reviewed:  Family History   Problem Relation Age of W+WO) 4/30/2021:  FINDINGS:   LUNG BASES: The heart is normal in size. Hypodensity of the intracardiac blood pool relative to the myocardium may relate to underlying anemia. Atherosclerotic vascular calcifications are present in the coronary vessels.  There   Trace atherosclerotic vascular calcifications of the abdominal aorta are observed. No aneurysm is detected. RETROPERITONEUM: No mass or lymphadenopathy is apparent.    BONES:   There are subacute-appearing fractures of the left posterior 11th and 12th r CHEST WALL: No axillary mass or lymphadenopathy.     LIMITED ABDOMEN: Nonspecific low density of the hepatic parenchyma may represent underlying hepatic steatosis.  Partially imaged probable small left upper pole renal cyst.   BONES: Subacute-appearing m appropriate control reactivity).     B. Stomach; biopsy:  · Fragments of gastric mucosa demonstrating mild chronic inactive gastritis. · No evidence of intestinal metaplasia, epithelial dysplasia, or malignancy identified.    · No evidence of bacterial o adenocarcinoma (0.3 cm in maximum dimension).     I. Rectosigmoid colon mass; biopsy:  · Fragments of infiltrating moderately differentiated adenocarcinoma present in a background of tubulovillous adenoma with severe/high-grade dysplasia.     Comment:   Fo

## 2021-06-17 ENCOUNTER — PATIENT MESSAGE (OUTPATIENT)
Dept: FAMILY MEDICINE CLINIC | Facility: CLINIC | Age: 71
End: 2021-06-17

## 2021-06-18 NOTE — TELEPHONE ENCOUNTER
Of note, PSR notified RN that patient cancelled appt. States that he doesn't need the appointment. Daughter Mickie Hatch was called and notified her that patient cancelled appt.  She will talk to him and try to get him to understand that he needs a preop juani

## 2021-06-18 NOTE — TELEPHONE ENCOUNTER
From: Raza Read  To: Brooke Morgan MD  Sent: 6/17/2021 11:53 AM CDT  Subject: Visit Paulette Lechuga Dr, this is [address] Lloyd's daughter. The decrease in Fe has been due to a tumor growing in my dad's colon.  He is scheduled for surgery

## 2021-06-18 NOTE — TELEPHONE ENCOUNTER
Daughter was called. Added to Dr. Miller Savoy schedule. Next Appt:    With [address] [place] [address] (Adal Carpenter MD)  06/21/2021 at 12:40 PM

## 2021-06-18 NOTE — TELEPHONE ENCOUNTER
Pre-Operative Testing  x CBC x CMP   BMP   x PT, PTT, INR   UA   EKG     Chest X-Ray   Cardiac Clearance x H & P Medical Clearance      Needs preop clearance as above per Dr. Sofy Reina. Added to schedule; ok per Dr. Emely Real. TSQ completed.     Next Appt:

## 2021-06-21 ENCOUNTER — LAB ENCOUNTER (OUTPATIENT)
Dept: LAB | Facility: HOSPITAL | Age: 71
End: 2021-06-21
Attending: FAMILY MEDICINE
Payer: MEDICARE

## 2021-06-21 ENCOUNTER — HOSPITAL ENCOUNTER (OUTPATIENT)
Dept: GENERAL RADIOLOGY | Facility: HOSPITAL | Age: 71
Discharge: HOME | End: 2021-06-21
Attending: FAMILY MEDICINE
Payer: MEDICARE

## 2021-06-21 ENCOUNTER — OFFICE VISIT (OUTPATIENT)
Dept: FAMILY MEDICINE CLINIC | Facility: CLINIC | Age: 71
End: 2021-06-21
Payer: MEDICARE

## 2021-06-21 DIAGNOSIS — I10 ESSENTIAL HYPERTENSION: ICD-10-CM

## 2021-06-21 DIAGNOSIS — R73.01 IMPAIRED FASTING GLUCOSE: ICD-10-CM

## 2021-06-21 DIAGNOSIS — Z01.818 ENCOUNTER FOR PRE-OPERATIVE EXAMINATION: ICD-10-CM

## 2021-06-21 DIAGNOSIS — Z01.818 ENCOUNTER FOR PRE-OPERATIVE EXAMINATION: Primary | ICD-10-CM

## 2021-06-21 PROCEDURE — 93005 ELECTROCARDIOGRAM TRACING: CPT

## 2021-06-21 PROCEDURE — 93010 ELECTROCARDIOGRAM REPORT: CPT | Performed by: FAMILY MEDICINE

## 2021-06-21 PROCEDURE — 99214 OFFICE O/P EST MOD 30 MIN: CPT | Performed by: FAMILY MEDICINE

## 2021-06-21 PROCEDURE — 83036 HEMOGLOBIN GLYCOSYLATED A1C: CPT

## 2021-06-21 PROCEDURE — 36415 COLL VENOUS BLD VENIPUNCTURE: CPT

## 2021-06-21 PROCEDURE — 80053 COMPREHEN METABOLIC PANEL: CPT

## 2021-06-21 PROCEDURE — 71046 X-RAY EXAM CHEST 2 VIEWS: CPT | Performed by: FAMILY MEDICINE

## 2021-06-21 PROCEDURE — 85025 COMPLETE CBC W/AUTO DIFF WBC: CPT | Performed by: FAMILY MEDICINE

## 2021-06-21 RX ORDER — AMLODIPINE AND VALSARTAN 10; 320 MG/1; MG/1
1 TABLET ORAL DAILY
Qty: 90 TABLET | Refills: 0 | Status: SHIPPED | OUTPATIENT
Start: 2021-06-21 | End: 2022-01-25

## 2021-06-23 ENCOUNTER — HOSPITAL ENCOUNTER (OUTPATIENT)
Dept: MRI IMAGING | Facility: HOSPITAL | Age: 71
Discharge: HOME | End: 2021-06-23
Attending: RADIOLOGY
Payer: MEDICARE

## 2021-06-23 VITALS
WEIGHT: 156 LBS | HEIGHT: 68 IN | SYSTOLIC BLOOD PRESSURE: 134 MMHG | DIASTOLIC BLOOD PRESSURE: 70 MMHG | BODY MASS INDEX: 23.64 KG/M2 | OXYGEN SATURATION: 99 % | HEART RATE: 102 BPM

## 2021-06-23 DIAGNOSIS — C61 PROSTATE CANCER (HCC): ICD-10-CM

## 2021-06-23 PROCEDURE — 72197 MRI PELVIS W/O & W/DYE: CPT | Performed by: RADIOLOGY

## 2021-06-23 PROCEDURE — A9575 INJ GADOTERATE MEGLUMI 0.1ML: HCPCS | Performed by: RADIOLOGY

## 2021-06-25 ENCOUNTER — TELEPHONE (OUTPATIENT)
Dept: SURGERY | Facility: CLINIC | Age: 71
End: 2021-06-25

## 2021-06-25 DIAGNOSIS — C20 ADENOCARCINOMA OF RECTUM (HCC): Primary | ICD-10-CM

## 2021-06-27 PROBLEM — K74.00 LIVER FIBROSIS: Status: ACTIVE | Noted: 2021-06-27

## 2021-06-27 NOTE — PROGRESS NOTES
[facility] Office Pre-OP Note    HPI:   Maria Isabel Boss is a 72year old male with a  recetn diagnosis of prostate cancer and rectosigmoid adenocarcinoma, who presents for a pre-operative  physical exam requested by Dr. Vannsesa Aponte.  Feli Besylate-Valsartan  MG Oral Tab Take 1 tablet by mouth daily. 90 tablet 0   • Neomycin Sulfate 500 MG Oral Tab Take 2 tablets at 2 pm, 3 pm and 10 pm the day before surgery.  6 tablet 0   • metRONIDAZOLE (FLAGYL) 500 MG Oral Tab Take 1 tablet at 2 pm, tobacco: Never Used    Vaping Use      Vaping Use: Never used    Substance and Sexual Activity      Alcohol use: Yes        Comment: 2 drinks of whisky daily      Drug use: Never      Sexual activity: Not on file    Other Topics      Concerns:        Caffe muscle aches, back pain + INTERMITTENT CHRONIC JOINT PAIN IN KNEES. NEUROLOGICAL:  Denies headache, seizures, dizziness, syncope  PSYCHIATRIC:  Denies depression.  ++ANXIETY WITH RECENT DX  ALLERGIES:  Denies allergic response,    EXAM:   /70 (BP Loc Proteinuria, Iron deficiency Anemia. Pt has no significant history of cardiac or pulmonary conditions and is asymptomatic from cardiopulmonary standpoint.    He has a Revised Cardiac Index score of 1 (for intraperiotneal surgery) which correlates with 6%

## 2021-06-29 ENCOUNTER — LAB ENCOUNTER (OUTPATIENT)
Dept: LAB | Facility: HOSPITAL | Age: 71
DRG: 330 | End: 2021-06-29
Attending: SURGERY
Payer: MEDICARE

## 2021-06-29 DIAGNOSIS — Z01.818 PREOP TESTING: ICD-10-CM

## 2021-06-29 LAB
ANTIBODY SCREEN: NEGATIVE
RH BLOOD TYPE: POSITIVE
SARS-COV-2 RNA RESP QL NAA+PROBE: NOT DETECTED

## 2021-06-29 PROCEDURE — 86850 RBC ANTIBODY SCREEN: CPT

## 2021-06-29 PROCEDURE — 86901 BLOOD TYPING SEROLOGIC RH(D): CPT

## 2021-06-29 PROCEDURE — 86900 BLOOD TYPING SEROLOGIC ABO: CPT

## 2021-06-29 PROCEDURE — 36415 COLL VENOUS BLD VENIPUNCTURE: CPT

## 2021-06-30 RX ORDER — SODIUM CHLORIDE, SODIUM LACTATE, POTASSIUM CHLORIDE, CALCIUM CHLORIDE 600; 310; 30; 20 MG/100ML; MG/100ML; MG/100ML; MG/100ML
INJECTION, SOLUTION INTRAVENOUS CONTINUOUS
Status: CANCELLED | OUTPATIENT
Start: 2021-06-30

## 2021-07-02 ENCOUNTER — HOSPITAL ENCOUNTER (INPATIENT)
Facility: HOSPITAL | Age: 71
LOS: 4 days | Discharge: HOME | DRG: 330 | End: 2021-07-06
Attending: SURGERY | Admitting: SURGERY
Payer: MEDICARE

## 2021-07-02 ENCOUNTER — ANESTHESIA (OUTPATIENT)
Dept: SURGERY | Facility: HOSPITAL | Age: 71
DRG: 330 | End: 2021-07-02
Payer: MEDICARE

## 2021-07-02 ENCOUNTER — ANESTHESIA EVENT (OUTPATIENT)
Dept: SURGERY | Facility: HOSPITAL | Age: 71
DRG: 330 | End: 2021-07-02
Payer: MEDICARE

## 2021-07-02 DIAGNOSIS — Z01.818 PREOP TESTING: ICD-10-CM

## 2021-07-02 DIAGNOSIS — C19 RECTOSIGMOID CANCER (HCC): Primary | ICD-10-CM

## 2021-07-02 DIAGNOSIS — C20 ADENOCARCINOMA OF RECTUM (HCC): ICD-10-CM

## 2021-07-02 DIAGNOSIS — R05.3 CHRONIC COUGH: ICD-10-CM

## 2021-07-02 LAB
ALBUMIN SERPL-MCNC: 3.2 G/DL (ref 3.4–5)
ALBUMIN/GLOB SERPL: 0.7 {RATIO} (ref 1–2)
ALP LIVER SERPL-CCNC: 54 U/L
ALT SERPL-CCNC: 44 U/L
ANION GAP SERPL CALC-SCNC: 10 MMOL/L (ref 0–18)
AST SERPL-CCNC: 75 U/L (ref 15–37)
BILIRUB SERPL-MCNC: 0.5 MG/DL (ref 0.1–2)
BUN BLD-MCNC: 7 MG/DL (ref 7–18)
BUN/CREAT SERPL: 11.7 (ref 10–20)
CALCIUM BLD-MCNC: 8 MG/DL (ref 8.5–10.1)
CHLORIDE SERPL-SCNC: 108 MMOL/L (ref 98–112)
CO2 SERPL-SCNC: 18 MMOL/L (ref 21–32)
CREAT BLD-MCNC: 0.6 MG/DL
DEPRECATED RDW RBC AUTO: 67.4 FL (ref 35.1–46.3)
ERYTHROCYTE [DISTWIDTH] IN BLOOD BY AUTOMATED COUNT: 22 % (ref 11–15)
GLOBULIN PLAS-MCNC: 4.3 G/DL (ref 2.8–4.4)
GLUCOSE BLD-MCNC: 172 MG/DL (ref 70–99)
HCT VFR BLD AUTO: 32.3 %
HGB BLD-MCNC: 10.5 G/DL
M PROTEIN MFR SERPL ELPH: 7.5 G/DL (ref 6.4–8.2)
MCH RBC QN AUTO: 27.5 PG (ref 26–34)
MCHC RBC AUTO-ENTMCNC: 32.5 G/DL (ref 31–37)
MCV RBC AUTO: 84.6 FL
OSMOLALITY SERPL CALC.SUM OF ELEC: 284 MOSM/KG (ref 275–295)
PLATELET # BLD AUTO: 123 10(3)UL (ref 150–450)
POTASSIUM SERPL-SCNC: 4.1 MMOL/L (ref 3.5–5.1)
RBC # BLD AUTO: 3.82 X10(6)UL
SODIUM SERPL-SCNC: 136 MMOL/L (ref 136–145)
WBC # BLD AUTO: 4.4 X10(3) UL (ref 4–11)

## 2021-07-02 PROCEDURE — 0DBN3ZZ EXCISION OF SIGMOID COLON, PERCUTANEOUS APPROACH: ICD-10-PCS | Performed by: SURGERY

## 2021-07-02 PROCEDURE — 99233 SBSQ HOSP IP/OBS HIGH 50: CPT | Performed by: INTERNAL MEDICINE

## 2021-07-02 PROCEDURE — 0DJD8ZZ INSPECTION OF LOWER INTESTINAL TRACT, VIA NATURAL OR ARTIFICIAL OPENING ENDOSCOPIC: ICD-10-PCS | Performed by: SURGERY

## 2021-07-02 PROCEDURE — 8E0W4CZ ROBOTIC ASSISTED PROCEDURE OF TRUNK REGION, PERCUTANEOUS ENDOSCOPIC APPROACH: ICD-10-PCS | Performed by: SURGERY

## 2021-07-02 RX ORDER — UBIDECARENONE/VITAMIN E MIXED 100 MG-100
1 CAPSULE ORAL DAILY
Status: DISCONTINUED | OUTPATIENT
Start: 2021-07-02 | End: 2021-07-02

## 2021-07-02 RX ORDER — HYDROMORPHONE HYDROCHLORIDE 1 MG/ML
0.6 INJECTION, SOLUTION INTRAMUSCULAR; INTRAVENOUS; SUBCUTANEOUS EVERY 5 MIN PRN
Status: DISCONTINUED | OUTPATIENT
Start: 2021-07-02 | End: 2021-07-02 | Stop reason: HOSPADM

## 2021-07-02 RX ORDER — HYDROCODONE BITARTRATE AND ACETAMINOPHEN 5; 325 MG/1; MG/1
1 TABLET ORAL AS NEEDED
Status: DISCONTINUED | OUTPATIENT
Start: 2021-07-02 | End: 2021-07-02 | Stop reason: HOSPADM

## 2021-07-02 RX ORDER — SODIUM CHLORIDE, SODIUM LACTATE, POTASSIUM CHLORIDE, CALCIUM CHLORIDE 600; 310; 30; 20 MG/100ML; MG/100ML; MG/100ML; MG/100ML
INJECTION, SOLUTION INTRAVENOUS CONTINUOUS
Status: DISCONTINUED | OUTPATIENT
Start: 2021-07-02 | End: 2021-07-02 | Stop reason: HOSPADM

## 2021-07-02 RX ORDER — ONDANSETRON 2 MG/ML
4 INJECTION INTRAMUSCULAR; INTRAVENOUS ONCE AS NEEDED
Status: DISCONTINUED | OUTPATIENT
Start: 2021-07-02 | End: 2021-07-02 | Stop reason: HOSPADM

## 2021-07-02 RX ORDER — MORPHINE SULFATE 10 MG/ML
6 INJECTION, SOLUTION INTRAMUSCULAR; INTRAVENOUS EVERY 10 MIN PRN
Status: DISCONTINUED | OUTPATIENT
Start: 2021-07-02 | End: 2021-07-02 | Stop reason: HOSPADM

## 2021-07-02 RX ORDER — METRONIDAZOLE 500 MG/100ML
500 INJECTION, SOLUTION INTRAVENOUS ONCE
Status: COMPLETED | OUTPATIENT
Start: 2021-07-02 | End: 2021-07-02

## 2021-07-02 RX ORDER — HEPARIN SODIUM 5000 [USP'U]/ML
5000 INJECTION, SOLUTION INTRAVENOUS; SUBCUTANEOUS ONCE
Status: COMPLETED | OUTPATIENT
Start: 2021-07-02 | End: 2021-07-02

## 2021-07-02 RX ORDER — DEXAMETHASONE SODIUM PHOSPHATE 4 MG/ML
VIAL (ML) INJECTION AS NEEDED
Status: DISCONTINUED | OUTPATIENT
Start: 2021-07-02 | End: 2021-07-02 | Stop reason: SURG

## 2021-07-02 RX ORDER — SODIUM CHLORIDE, SODIUM LACTATE, POTASSIUM CHLORIDE, CALCIUM CHLORIDE 600; 310; 30; 20 MG/100ML; MG/100ML; MG/100ML; MG/100ML
INJECTION, SOLUTION INTRAVENOUS CONTINUOUS PRN
Status: DISCONTINUED | OUTPATIENT
Start: 2021-07-02 | End: 2021-07-02 | Stop reason: SURG

## 2021-07-02 RX ORDER — LIDOCAINE HYDROCHLORIDE 10 MG/ML
INJECTION, SOLUTION EPIDURAL; INFILTRATION; INTRACAUDAL; PERINEURAL AS NEEDED
Status: DISCONTINUED | OUTPATIENT
Start: 2021-07-02 | End: 2021-07-02 | Stop reason: SURG

## 2021-07-02 RX ORDER — OXYCODONE HYDROCHLORIDE 5 MG/1
5 TABLET ORAL EVERY 4 HOURS PRN
Status: DISCONTINUED | OUTPATIENT
Start: 2021-07-02 | End: 2021-07-06

## 2021-07-02 RX ORDER — PROCHLORPERAZINE EDISYLATE 5 MG/ML
5 INJECTION INTRAMUSCULAR; INTRAVENOUS ONCE AS NEEDED
Status: DISCONTINUED | OUTPATIENT
Start: 2021-07-02 | End: 2021-07-02 | Stop reason: HOSPADM

## 2021-07-02 RX ORDER — MIDAZOLAM HYDROCHLORIDE 1 MG/ML
INJECTION INTRAMUSCULAR; INTRAVENOUS AS NEEDED
Status: DISCONTINUED | OUTPATIENT
Start: 2021-07-02 | End: 2021-07-02 | Stop reason: SURG

## 2021-07-02 RX ORDER — HYDROCODONE BITARTRATE AND ACETAMINOPHEN 5; 325 MG/1; MG/1
2 TABLET ORAL AS NEEDED
Status: DISCONTINUED | OUTPATIENT
Start: 2021-07-02 | End: 2021-07-02 | Stop reason: HOSPADM

## 2021-07-02 RX ORDER — ACETAMINOPHEN 10 MG/ML
1000 INJECTION, SOLUTION INTRAVENOUS EVERY 6 HOURS
Status: DISCONTINUED | OUTPATIENT
Start: 2021-07-02 | End: 2021-07-04

## 2021-07-02 RX ORDER — ROCURONIUM BROMIDE 10 MG/ML
INJECTION, SOLUTION INTRAVENOUS AS NEEDED
Status: DISCONTINUED | OUTPATIENT
Start: 2021-07-02 | End: 2021-07-02 | Stop reason: SURG

## 2021-07-02 RX ORDER — KETAMINE HYDROCHLORIDE 50 MG/ML
INJECTION, SOLUTION, CONCENTRATE INTRAMUSCULAR; INTRAVENOUS AS NEEDED
Status: DISCONTINUED | OUTPATIENT
Start: 2021-07-02 | End: 2021-07-02 | Stop reason: SURG

## 2021-07-02 RX ORDER — HALOPERIDOL 5 MG/ML
0.25 INJECTION INTRAMUSCULAR ONCE AS NEEDED
Status: DISCONTINUED | OUTPATIENT
Start: 2021-07-02 | End: 2021-07-02 | Stop reason: HOSPADM

## 2021-07-02 RX ORDER — MORPHINE SULFATE 4 MG/ML
4 INJECTION, SOLUTION INTRAMUSCULAR; INTRAVENOUS EVERY 10 MIN PRN
Status: DISCONTINUED | OUTPATIENT
Start: 2021-07-02 | End: 2021-07-02 | Stop reason: HOSPADM

## 2021-07-02 RX ORDER — SODIUM CHLORIDE, SODIUM LACTATE, POTASSIUM CHLORIDE, CALCIUM CHLORIDE 600; 310; 30; 20 MG/100ML; MG/100ML; MG/100ML; MG/100ML
INJECTION, SOLUTION INTRAVENOUS CONTINUOUS
Status: DISCONTINUED | OUTPATIENT
Start: 2021-07-02 | End: 2021-07-03 | Stop reason: ALTCHOICE

## 2021-07-02 RX ORDER — NALOXONE HYDROCHLORIDE 0.4 MG/ML
80 INJECTION, SOLUTION INTRAMUSCULAR; INTRAVENOUS; SUBCUTANEOUS AS NEEDED
Status: DISCONTINUED | OUTPATIENT
Start: 2021-07-02 | End: 2021-07-02 | Stop reason: HOSPADM

## 2021-07-02 RX ORDER — MAGNESIUM HYDROXIDE 1200 MG/15ML
LIQUID ORAL CONTINUOUS PRN
Status: COMPLETED | OUTPATIENT
Start: 2021-07-02 | End: 2021-07-02

## 2021-07-02 RX ORDER — HYDROMORPHONE HYDROCHLORIDE 1 MG/ML
0.2 INJECTION, SOLUTION INTRAMUSCULAR; INTRAVENOUS; SUBCUTANEOUS EVERY 5 MIN PRN
Status: DISCONTINUED | OUTPATIENT
Start: 2021-07-02 | End: 2021-07-02 | Stop reason: HOSPADM

## 2021-07-02 RX ORDER — ONDANSETRON 2 MG/ML
INJECTION INTRAMUSCULAR; INTRAVENOUS AS NEEDED
Status: DISCONTINUED | OUTPATIENT
Start: 2021-07-02 | End: 2021-07-02 | Stop reason: SURG

## 2021-07-02 RX ORDER — MORPHINE SULFATE 4 MG/ML
2 INJECTION, SOLUTION INTRAMUSCULAR; INTRAVENOUS EVERY 10 MIN PRN
Status: DISCONTINUED | OUTPATIENT
Start: 2021-07-02 | End: 2021-07-02 | Stop reason: HOSPADM

## 2021-07-02 RX ORDER — SODIUM CHLORIDE 9 MG/ML
INJECTION, SOLUTION INTRAVENOUS CONTINUOUS
Status: DISCONTINUED | OUTPATIENT
Start: 2021-07-02 | End: 2021-07-03 | Stop reason: ALTCHOICE

## 2021-07-02 RX ORDER — HYDROMORPHONE HYDROCHLORIDE 1 MG/ML
0.4 INJECTION, SOLUTION INTRAMUSCULAR; INTRAVENOUS; SUBCUTANEOUS EVERY 5 MIN PRN
Status: DISCONTINUED | OUTPATIENT
Start: 2021-07-02 | End: 2021-07-02 | Stop reason: HOSPADM

## 2021-07-02 RX ORDER — AMLODIPINE AND VALSARTAN 10; 320 MG/1; MG/1
1 TABLET ORAL DAILY
Status: DISCONTINUED | OUTPATIENT
Start: 2021-07-03 | End: 2021-07-02 | Stop reason: SDUPTHER

## 2021-07-02 RX ORDER — SODIUM CHLORIDE, SODIUM LACTATE, POTASSIUM CHLORIDE, CALCIUM CHLORIDE 600; 310; 30; 20 MG/100ML; MG/100ML; MG/100ML; MG/100ML
INJECTION, SOLUTION INTRAVENOUS CONTINUOUS PRN
Status: DISCONTINUED | OUTPATIENT
Start: 2021-07-02 | End: 2021-07-02

## 2021-07-02 RX ORDER — SODIUM CHLORIDE 9 MG/ML
INJECTION, SOLUTION INTRAVENOUS CONTINUOUS PRN
Status: DISCONTINUED | OUTPATIENT
Start: 2021-07-02 | End: 2021-07-02 | Stop reason: SURG

## 2021-07-02 RX ORDER — ONDANSETRON 2 MG/ML
4 INJECTION INTRAMUSCULAR; INTRAVENOUS EVERY 6 HOURS PRN
Status: DISCONTINUED | OUTPATIENT
Start: 2021-07-02 | End: 2021-07-06

## 2021-07-02 RX ORDER — ENOXAPARIN SODIUM 100 MG/ML
40 INJECTION SUBCUTANEOUS DAILY
Status: DISCONTINUED | OUTPATIENT
Start: 2021-07-03 | End: 2021-07-03

## 2021-07-02 RX ORDER — ACETAMINOPHEN 500 MG
1000 TABLET ORAL ONCE
Status: COMPLETED | OUTPATIENT
Start: 2021-07-02 | End: 2021-07-02

## 2021-07-02 RX ORDER — SIMETHICONE 80 MG
80 TABLET,CHEWABLE ORAL
Status: DISCONTINUED | OUTPATIENT
Start: 2021-07-02 | End: 2021-07-06

## 2021-07-02 RX ADMIN — SODIUM CHLORIDE: 9 INJECTION, SOLUTION INTRAVENOUS at 07:45:00

## 2021-07-02 RX ADMIN — METRONIDAZOLE 500 MG: 500 INJECTION, SOLUTION INTRAVENOUS at 07:50:00

## 2021-07-02 RX ADMIN — DEXAMETHASONE SODIUM PHOSPHATE 4 MG: 4 MG/ML VIAL (ML) INJECTION at 10:32:00

## 2021-07-02 RX ADMIN — ROCURONIUM BROMIDE 20 MG: 10 INJECTION, SOLUTION INTRAVENOUS at 08:35:00

## 2021-07-02 RX ADMIN — SODIUM CHLORIDE, SODIUM LACTATE, POTASSIUM CHLORIDE, CALCIUM CHLORIDE: 600; 310; 30; 20 INJECTION, SOLUTION INTRAVENOUS at 12:23:00

## 2021-07-02 RX ADMIN — KETAMINE HYDROCHLORIDE 50 MG: 50 INJECTION, SOLUTION, CONCENTRATE INTRAMUSCULAR; INTRAVENOUS at 07:58:00

## 2021-07-02 RX ADMIN — LIDOCAINE HYDROCHLORIDE 50 MG: 10 INJECTION, SOLUTION EPIDURAL; INFILTRATION; INTRACAUDAL; PERINEURAL at 07:41:00

## 2021-07-02 RX ADMIN — MIDAZOLAM HYDROCHLORIDE 2 MG: 1 INJECTION INTRAMUSCULAR; INTRAVENOUS at 07:39:00

## 2021-07-02 RX ADMIN — SODIUM CHLORIDE: 9 INJECTION, SOLUTION INTRAVENOUS at 10:15:00

## 2021-07-02 RX ADMIN — ROCURONIUM BROMIDE 50 MG: 10 INJECTION, SOLUTION INTRAVENOUS at 07:41:00

## 2021-07-02 RX ADMIN — SODIUM CHLORIDE: 9 INJECTION, SOLUTION INTRAVENOUS at 12:19:00

## 2021-07-02 RX ADMIN — SODIUM CHLORIDE: 9 INJECTION, SOLUTION INTRAVENOUS at 12:18:00

## 2021-07-02 RX ADMIN — ONDANSETRON 4 MG: 2 INJECTION INTRAMUSCULAR; INTRAVENOUS at 10:32:00

## 2021-07-02 RX ADMIN — ROCURONIUM BROMIDE 20 MG: 10 INJECTION, SOLUTION INTRAVENOUS at 09:52:00

## 2021-07-02 RX ADMIN — ROCURONIUM BROMIDE 20 MG: 10 INJECTION, SOLUTION INTRAVENOUS at 10:52:00

## 2021-07-02 RX ADMIN — SODIUM CHLORIDE, SODIUM LACTATE, POTASSIUM CHLORIDE, CALCIUM CHLORIDE: 600; 310; 30; 20 INJECTION, SOLUTION INTRAVENOUS at 12:41:00

## 2021-07-02 NOTE — PROGRESS NOTES
[facility] HOSP - [facility]    Progress Note    Romero Jaspreeton Patient Status:  Inpatient    1950 MRN [id number]   Location [facility] 4W/SW/SE Attending Best Corona MD   Hosp Day # 0 PCP Toby Espinosa MD     CC: Abdominal Pain ondansetron HCl, phenol, oxyCODONE HCl    Results:     Recent Labs   Lab 07/02/21  1251   RBC 3.82   HGB 10.5*   HCT 32.3*   MCV 84.6   MCH 27.5   MCHC 32.5   RDW 22.0*   WBC 4.4   .0*     Recent Labs   Lab 07/02/21  1251   *   BUN 7   CREA Data System version 2 (PI-RADS v2) Assessment Categories:  PI-RADS 1: Very low (clinically significant cancer is highly unlikely to be present) PI-RADS 2: Low (clinically significant cancer is unlikely to be present) PI-RADS 3: Intermediate (the presence o

## 2021-07-02 NOTE — ANESTHESIA PROCEDURE NOTES
Airway  Date/Time: 7/2/2021 8:40 AM  Urgency: Elective    Airway not difficult    General Information and Staff    Patient location during procedure: OR  Anesthesiologist: Nick Coe DO  Resident/CRNA: Rogelio Ann CRNA  Performed: CRNA and anesth

## 2021-07-02 NOTE — OPERATIVE REPORT
Date of operation 7/2/2021    Preoperative diagnosis:  1. Rectosigmoid adenocarcinoma    Operation performed:  1.  Robotic assisted low anterior resection with primary end-to-end anastomosis  2. ICG interrogation of bowel vascularity  3.   Sigmoidoscopy was then carried from medial to lateral along the avascular plane  the retroperitoneum off of the TME plane. This was carried more cephalad and the superior hemorrhoidal vessel was isolated.   I then approached the sigmoid colon from lateral to m the usual fashion using 3-0 Prolene suture. The distal extent of the sigmoid as well as the anvil were then dunked. Occlusion was established with the Gonzalez device and pneumoperitoneum was reinitiated.   Then, I introduced the stapler through the anus to

## 2021-07-02 NOTE — BRIEF OP NOTE
Pre-Operative Diagnosis: Adenocarcinoma of rectum ([address] [C20]     Post-Operative Diagnosis: Adenocarcinoma of rectum ([address] [C20]      Procedure Performed:   XI Robot-assisted low anterior resection, flexible sigmoidoscopy, ICG interogation    Surgeon(s) an

## 2021-07-02 NOTE — ANESTHESIA PREPROCEDURE EVALUATION
Anesthesia PreOp Note    HPI:     Zehra Corea is a 72year old male who presents for preoperative consultation requested by: Meghan Bar MD    Date of Surgery: 7/2/2021    Procedure(s):  XI Robot-assisted low anterior resection, possible open; flexible 236 g Oral Recon Soln, Begin between 2-3 pm the afternoon before surgery. Follow pharmacy product instructions. , Disp: 4000 mL, Rfl: 0, 7/1/2021 at 1330  melatonin 5 MG Oral Cap, Take 5 mg by mouth nightly.  , Disp: , Rfl: , 7/1/2021 at [address] Never Smoker      Smokeless tobacco: Never Used    Vaping Use      Vaping Use: Never used    Substance and Sexual Activity      Alcohol use:  Yes        Alcohol/week: 14.0 standard drinks        Types: 14 Shots of liquor per week        Comment: 2 drinks of 06/21/2021     (H) 06/21/2021    CA 9.5 06/21/2021          Vital Signs: Body mass index is 23.57 kg/m². height is 1.727 m (5' 8\") and weight is 70.3 kg (155 lb). His oral temperature is 98 °F (36.7 °C).  His blood pressure is 151/85 and his puls

## 2021-07-02 NOTE — ANESTHESIA POSTPROCEDURE EVALUATION
Patient: Riki Clayton    Procedure Summary     Date: 07/02/21 Room / Location: [address] [place] [address] OR    Anesthesia Start: 5481 Anesthesia Stop:     Procedure: XI Robot-assisted low anterior resection, flexible sigmoidoscopy, ICG interogation (N/A ) D

## 2021-07-02 NOTE — PROGRESS NOTES
Pharmacy Note: Dietary Supplement Discontinuation Per Policy    OFP62 has been discontinued on [address] per policy. This supplement may be restarted upon discharge using the medication reconciliation process.     Thank you,   Spencer Nathan, PharmD  7/2/

## 2021-07-02 NOTE — INTERVAL H&P NOTE
Patient seen and examined. No changes to the attached history and physical are noted. 72year old male presenting today for planned LAR. Santos Camejo MD  [facility] General Surgical Oncology  [place] [address]  Pager [phone]  [email or web address]

## 2021-07-03 LAB
ALBUMIN SERPL-MCNC: 2.8 G/DL (ref 3.4–5)
ALBUMIN/GLOB SERPL: 0.8 {RATIO} (ref 1–2)
ALP LIVER SERPL-CCNC: 39 U/L
ALT SERPL-CCNC: 38 U/L
ANION GAP SERPL CALC-SCNC: 9 MMOL/L (ref 0–18)
AST SERPL-CCNC: 44 U/L (ref 15–37)
BASOPHILS # BLD AUTO: 0.01 X10(3) UL (ref 0–0.2)
BASOPHILS NFR BLD AUTO: 0.2 %
BILIRUB SERPL-MCNC: 0.5 MG/DL (ref 0.1–2)
BUN BLD-MCNC: 6 MG/DL (ref 7–18)
BUN/CREAT SERPL: 9.8 (ref 10–20)
CALCIUM BLD-MCNC: 8.2 MG/DL (ref 8.5–10.1)
CHLORIDE SERPL-SCNC: 102 MMOL/L (ref 98–112)
CO2 SERPL-SCNC: 22 MMOL/L (ref 21–32)
CREAT BLD-MCNC: 0.61 MG/DL
DEPRECATED RDW RBC AUTO: 67.4 FL (ref 35.1–46.3)
DEPRECATED RDW RBC AUTO: 67.9 FL (ref 35.1–46.3)
EOSINOPHIL # BLD AUTO: 0 X10(3) UL (ref 0–0.7)
EOSINOPHIL NFR BLD AUTO: 0 %
ERYTHROCYTE [DISTWIDTH] IN BLOOD BY AUTOMATED COUNT: 22.2 % (ref 11–15)
ERYTHROCYTE [DISTWIDTH] IN BLOOD BY AUTOMATED COUNT: 22.3 % (ref 11–15)
GLOBULIN PLAS-MCNC: 3.7 G/DL (ref 2.8–4.4)
GLUCOSE BLD-MCNC: 120 MG/DL (ref 70–99)
HAV IGM SER QL: 1.5 MG/DL (ref 1.6–2.6)
HCT VFR BLD AUTO: 25.2 %
HCT VFR BLD AUTO: 25.4 %
HGB BLD-MCNC: 8.2 G/DL
HGB BLD-MCNC: 8.3 G/DL
IMM GRANULOCYTES # BLD AUTO: 0.01 X10(3) UL (ref 0–1)
IMM GRANULOCYTES NFR BLD: 0.2 %
LYMPHOCYTES # BLD AUTO: 0.72 X10(3) UL (ref 1–4)
LYMPHOCYTES NFR BLD AUTO: 15.5 %
M PROTEIN MFR SERPL ELPH: 6.5 G/DL (ref 6.4–8.2)
MCH RBC QN AUTO: 27.6 PG (ref 26–34)
MCH RBC QN AUTO: 27.8 PG (ref 26–34)
MCHC RBC AUTO-ENTMCNC: 32.5 G/DL (ref 31–37)
MCHC RBC AUTO-ENTMCNC: 32.7 G/DL (ref 31–37)
MCV RBC AUTO: 84.8 FL
MCV RBC AUTO: 84.9 FL
MONOCYTES # BLD AUTO: 0.55 X10(3) UL (ref 0.1–1)
MONOCYTES NFR BLD AUTO: 11.9 %
NEUTROPHILS # BLD AUTO: 3.35 X10 (3) UL (ref 1.5–7.7)
NEUTROPHILS # BLD AUTO: 3.35 X10(3) UL (ref 1.5–7.7)
NEUTROPHILS NFR BLD AUTO: 72.2 %
OSMOLALITY SERPL CALC.SUM OF ELEC: 275 MOSM/KG (ref 275–295)
PHOSPHATE SERPL-MCNC: 3.7 MG/DL (ref 2.5–4.9)
PLATELET # BLD AUTO: 121 10(3)UL (ref 150–450)
PLATELET # BLD AUTO: 122 10(3)UL (ref 150–450)
PLATELET MORPHOLOGY: NORMAL
POTASSIUM SERPL-SCNC: 3.8 MMOL/L (ref 3.5–5.1)
RBC # BLD AUTO: 2.97 X10(6)UL
RBC # BLD AUTO: 2.99 X10(6)UL
SODIUM SERPL-SCNC: 133 MMOL/L (ref 136–145)
WBC # BLD AUTO: 4.6 X10(3) UL (ref 4–11)
WBC # BLD AUTO: 5 X10(3) UL (ref 4–11)

## 2021-07-03 PROCEDURE — 99233 SBSQ HOSP IP/OBS HIGH 50: CPT | Performed by: HOSPITALIST

## 2021-07-03 RX ORDER — BENZONATATE 100 MG/1
100 CAPSULE ORAL 3 TIMES DAILY PRN
Status: DISCONTINUED | OUTPATIENT
Start: 2021-07-03 | End: 2021-07-06

## 2021-07-03 RX ORDER — GUAIFENESIN 100 MG/5ML
100 SOLUTION ORAL EVERY 4 HOURS PRN
Status: DISCONTINUED | OUTPATIENT
Start: 2021-07-03 | End: 2021-07-03

## 2021-07-03 RX ORDER — CODEINE PHOSPHATE AND GUAIFENESIN 10; 100 MG/5ML; MG/5ML
5 SOLUTION ORAL EVERY 4 HOURS PRN
Status: DISCONTINUED | OUTPATIENT
Start: 2021-07-03 | End: 2021-07-06

## 2021-07-03 RX ORDER — DEXTROSE, SODIUM CHLORIDE, AND POTASSIUM CHLORIDE 5; .45; .15 G/100ML; G/100ML; G/100ML
INJECTION INTRAVENOUS CONTINUOUS
Status: DISCONTINUED | OUTPATIENT
Start: 2021-07-03 | End: 2021-07-04

## 2021-07-03 RX ORDER — MAGNESIUM OXIDE 400 MG (241.3 MG MAGNESIUM) TABLET
800 TABLET ONCE
Status: COMPLETED | OUTPATIENT
Start: 2021-07-03 | End: 2021-07-03

## 2021-07-03 RX ORDER — FLUTICASONE PROPIONATE 50 MCG
1 SPRAY, SUSPENSION (ML) NASAL DAILY
Status: DISCONTINUED | OUTPATIENT
Start: 2021-07-03 | End: 2021-07-03

## 2021-07-03 RX ORDER — TRAMADOL HYDROCHLORIDE 50 MG/1
50 TABLET ORAL EVERY 6 HOURS PRN
Status: DISCONTINUED | OUTPATIENT
Start: 2021-07-03 | End: 2021-07-06

## 2021-07-03 RX ORDER — ZOLPIDEM TARTRATE 5 MG/1
5 TABLET ORAL NIGHTLY PRN
Status: DISCONTINUED | OUTPATIENT
Start: 2021-07-03 | End: 2021-07-06

## 2021-07-03 NOTE — PROGRESS NOTES
[facility]    Progress Note    Dimitri Carter Patient Status:  Inpatient    1950 MRN [id number]   Location [facility] 4W/SW/SE Attending Laya Goldberg MD   Hosp Day # 1 PCP Alba Gloria MD     CC: rectosigmoid ad guaiFENesin-codeine, ondansetron HCl, phenol, oxyCODONE HCl, benzocaine-menthol    Results:     Recent Labs   Lab 07/02/21  1251 07/03/21  0551 07/03/21  0952   RBC 3.82 2.99* 2.97*   HGB 10.5* 8.3* 8.2*   HCT 32.3* 25.4* 25.2*   MCV 84.6 84.9 84.8   MCH 2

## 2021-07-03 NOTE — PLAN OF CARE
Problem: Patient/Family Goals  Goal: Patient/Family Long Term Goal  Description: Patient's Long Term Goal: go home    Interventions:  - follow plan of care  - See additional Care Plan goals for specific interventions  7/3/2021 0347 by VENTURA Stoll injury  Description: INTERVENTIONS:  - Assess pt frequently for physical needs  - Identify cognitive and physical deficits and behaviors that affect risk of falls.   - [place] fall precautions as indicated by assessment.  - Educate pt/family on patient sa medications  - Provide nonpharmacologic comfort measures as appropriate  - Advance diet as tolerated, if ordered  - Obtain nutritional consult as needed  - Evaluate fluid balance  Outcome: Progressing  Goal: Maintains or returns to baseline bowel function

## 2021-07-03 NOTE — PLAN OF CARE
Pt is alert/oriented following his procedure with Dr. Vannessa Aponte. Vitals stable. 5 abdominal lap sites with gauze and tegaderm. Some drainage present, monitoring closely. Has remained stable since arrival to unit. Tolerating clear liquid diet.  Educated on ea response  - Consider cultural and social influences on pain and pain management  - Manage/alleviate anxiety  - Utilize distraction and/or relaxation techniques  - Monitor for opioid side effects  - Notify MD/LIP if interventions unsuccessful or patient rep discharge planning if the patient needs post-hospital services based on physician/LIP order or complex needs related to functional status, cognitive ability or social support system  Outcome: Progressing

## 2021-07-03 NOTE — PLAN OF CARE
Nicolás Ruffin states he is feeling better today. PCA dilaudid in place, patient using it PRN. Scheduled tylenol given. IVF continued. Pts abdomen distended and firm. +gas. Linares in place with adequate urine output.  Up with walker and standby assist, sat in the regino measures as appropriate and evaluate response  - Consider cultural and social influences on pain and pain management  - Manage/alleviate anxiety  - Utilize distraction and/or relaxation techniques  - Monitor for opioid side effects  - Notify MD/LIP if inte Department for coordinating discharge planning if the patient needs post-hospital services based on physician/LIP order or complex needs related to functional status, cognitive ability or social support system  Outcome: Progressing     Problem: Daniel Torrez

## 2021-07-03 NOTE — DIETARY NOTE
ADULT NUTRITION [address] [facility] NUTR ASSESS:8243}    Pt is at {Nutrition NCSL:325771286} nutrition risk. Pt {Meets/Does Not Meet:6564} malnutrition criteria. CRITERIA FOR MALNUTRITION DIAGNOSIS:  {Criteria for Mal XQ:177029028}.     RECOMMENDATIONS TO MD:  {BRIDGER 128 391     GASTROINTESTINAL: 7/3 feeling bloated post-op.      NUTRITION RELATED PHYSICAL FINDINGS:  - Nutrition Focused Physical Exam (NFPE): [address] Matti JARRELL NUTR BODY FAT/MM:193014928} per visual exam.  - Fluid Accumulation: {[facility] CHRYSTAL NUTR FLUID DLD:240384140} per with spouse. MONITOR AND EVALUATE/NUTRITION GOALS:  - Food and Nutrient Intake:      Monitor: adequacy of PO intake, tolerance of PO intake and for PO diet advancement.   - Anthropometric Measurement:      Monitor weight  - Nutrition Goals:      {Nutrit

## 2021-07-03 NOTE — PROGRESS NOTES
Surgical Oncology Inpatient Progress Note    Subjective:  VSS  Less bloated  Passing gas    Objective:  Temp:  [97.3 °F (36.3 °C)-98.2 °F (36.8 °C)] 98.2 °F (36.8 °C)  Pulse:  [67-98] 94  Resp:  [12-19] 16  BP: (112-145)/(70-85) 134/83    Intake/Output:

## 2021-07-04 LAB
ANION GAP SERPL CALC-SCNC: 7 MMOL/L (ref 0–18)
BASOPHILS # BLD AUTO: 0.01 X10(3) UL (ref 0–0.2)
BASOPHILS NFR BLD AUTO: 0.2 %
BUN BLD-MCNC: 6 MG/DL (ref 7–18)
BUN/CREAT SERPL: 8.5 (ref 10–20)
CALCIUM BLD-MCNC: 8.9 MG/DL (ref 8.5–10.1)
CHLORIDE SERPL-SCNC: 100 MMOL/L (ref 98–112)
CO2 SERPL-SCNC: 23 MMOL/L (ref 21–32)
CREAT BLD-MCNC: 0.71 MG/DL
DEPRECATED RDW RBC AUTO: 66.8 FL (ref 35.1–46.3)
EOSINOPHIL # BLD AUTO: 0.08 X10(3) UL (ref 0–0.7)
EOSINOPHIL NFR BLD AUTO: 1.6 %
ERYTHROCYTE [DISTWIDTH] IN BLOOD BY AUTOMATED COUNT: 21.9 % (ref 11–15)
GLUCOSE BLD-MCNC: 123 MG/DL (ref 70–99)
HAV IGM SER QL: 1.8 MG/DL (ref 1.6–2.6)
HCT VFR BLD AUTO: 24.9 %
HGB BLD-MCNC: 8.2 G/DL
IMM GRANULOCYTES # BLD AUTO: 0.03 X10(3) UL (ref 0–1)
IMM GRANULOCYTES NFR BLD: 0.6 %
LYMPHOCYTES # BLD AUTO: 0.67 X10(3) UL (ref 1–4)
LYMPHOCYTES NFR BLD AUTO: 13.2 %
MCH RBC QN AUTO: 28.1 PG (ref 26–34)
MCHC RBC AUTO-ENTMCNC: 32.9 G/DL (ref 31–37)
MCV RBC AUTO: 85.3 FL
MONOCYTES # BLD AUTO: 0.54 X10(3) UL (ref 0.1–1)
MONOCYTES NFR BLD AUTO: 10.6 %
NEUTROPHILS # BLD AUTO: 3.75 X10 (3) UL (ref 1.5–7.7)
NEUTROPHILS # BLD AUTO: 3.75 X10(3) UL (ref 1.5–7.7)
NEUTROPHILS NFR BLD AUTO: 73.8 %
OSMOLALITY SERPL CALC.SUM OF ELEC: 269 MOSM/KG (ref 275–295)
PLATELET # BLD AUTO: 128 10(3)UL (ref 150–450)
POTASSIUM SERPL-SCNC: 3.5 MMOL/L (ref 3.5–5.1)
RBC # BLD AUTO: 2.92 X10(6)UL
SODIUM SERPL-SCNC: 130 MMOL/L (ref 136–145)
WBC # BLD AUTO: 5.1 X10(3) UL (ref 4–11)

## 2021-07-04 PROCEDURE — 99233 SBSQ HOSP IP/OBS HIGH 50: CPT | Performed by: HOSPITALIST

## 2021-07-04 RX ORDER — ENOXAPARIN SODIUM 100 MG/ML
40 INJECTION SUBCUTANEOUS DAILY
Status: DISCONTINUED | OUTPATIENT
Start: 2021-07-04 | End: 2021-07-06

## 2021-07-04 RX ORDER — MAGNESIUM OXIDE 400 MG (241.3 MG MAGNESIUM) TABLET
400 TABLET ONCE
Status: COMPLETED | OUTPATIENT
Start: 2021-07-04 | End: 2021-07-04

## 2021-07-04 RX ORDER — ACETAMINOPHEN 500 MG
1000 TABLET ORAL EVERY 6 HOURS
Status: DISCONTINUED | OUTPATIENT
Start: 2021-07-04 | End: 2021-07-06

## 2021-07-04 NOTE — PROGRESS NOTES
POD#2    Feels well  Tolerating fulls  +flatus    Blood pressure (!) 156/98, pulse 94, temperature 98.1 °F (36.7 °C), temperature source Oral, resp. rate 18, height 1.727 m (5' 8\"), weight 70.3 kg (155 lb), SpO2 97 %.       Intake/Output Summary (Last 24 h

## 2021-07-04 NOTE — DIETARY NOTE
ADULT NUTRITION INITIAL ASSESSMENT    Pt is at moderate nutrition risk. Pt meets moderate malnutrition criteria.       CRITERIA FOR MALNUTRITION DIAGNOSIS:  Criteria for severe malnutrition diagnosis: acute illness/injury related to wt loss greater than 5% 0.60* 0.61* 0.71   CA 8.0* 8.2* 8.9   MG  --  1.5* 1.8    133* 130*   K 4.1 3.8 3.5    102 100   CO2 18.0* 22.0 23.0   PHOS  --  3.7  --    OSMOCALC 284 275 269*     GASTROINTESTINAL: distention, gas and awaiting BM.     NUTRITION RELATED PHYSIC small frequent meals, Encouraged adequate PO intake and Encouraged increased PO intake  - Medical Food Supplements-RD added Ensure Compact (220 calories/ 9 g protein each) Vanilla BID. Rational/use of oral supplements discussed.   - Vitamin and mineral supp

## 2021-07-04 NOTE — OCCUPATIONAL THERAPY NOTE
OCCUPATIONAL THERAPY EVALUATION - INPATIENT     Room Number: 440/440-A  Evaluation Date: 7/4/2021  Type of Evaluation: Initial       Physician Order: IP Consult to Occupational Therapy  Reason for Therapy: ADL/IADL Dysfunction and Discharge Planning    [facility] History  Past Medical History:   Diagnosis Date   • Anemia    • Cirrhosis ([facility] [address]    • Essential hypertension    • [facility] 04/20/2021   • Visual impairment     reading glasses       Past Surgical History  Past Surgical History:   Procedure Late functional limits; BUE MMT not formally assessed as pt with recent abdominal surgery     COORDINATION  Gross Motor: WFL   Fine Motor: WFL     ACTIVITIES OF DAILY LIVING ASSESSMENT  AM-PAC ‘6-Clicks’ Inpatient Daily Activity Short Form  How much help from a

## 2021-07-04 NOTE — PROGRESS NOTES
[facility]    Progress Note    Solitario Ochoa Patient Status:  Inpatient    1950 MRN [id number]   Location [facility] 4W/SW/SE Attending Bryant Marsh MD   Hosp Day # 2 PCP Katherin Villafana MD     CC: rectosigmoid ad HS       Current PRN Inpatient Meds:      benzonatate, traMADol HCl, guaiFENesin-codeine, zolpidem, ondansetron HCl, phenol, oxyCODONE HCl, benzocaine-menthol    Results:     Recent Labs   Lab 07/03/21  0551 07/03/21  0952 07/04/21  0655   RBC 2.99* 2.97* minutes spent, >50% spent counseling re: treatment plan and workup    Homar Patel MD

## 2021-07-04 NOTE — PLAN OF CARE
IVF. Dilaudid PCA. Scheduled tylenol. PRN robitussin and tessalon for productive cough. Encouraged IS. Passing flatus. Ambulated in hallway x2. Up to chair. Linares catheter intact and draining. Tolerating clear liquids.                 Problem: Patient interventions unsuccessful or patient reports new pain  - Anticipate increased pain with activity and pre-medicate as appropriate  Outcome: Progressing     Problem: RISK FOR INFECTION - ADULT  Goal: Absence of fever/infection during anticipated neutropenic GASTROINTESTINAL - ADULT  Goal: Minimal or absence of nausea and vomiting  Description: INTERVENTIONS:  - Maintain adequate hydration with IV or PO as ordered and tolerated  - Nasogastric tube to low intermittent suction as ordered  - Evaluate effectivenes

## 2021-07-04 NOTE — PLAN OF CARE
Patient alert and oriented, on room air. States pain to abdomen, PRN Tramadol and scheduled Tylenol given. Dilaudid PCA discontinued. Productive cough, PRN robitussin given. Incentive spirometer encouraged. Nauseous in the morning, PRN zofran given.  Remote pain goal  Description: INTERVENTIONS:  - Encourage pt to monitor pain and request assistance  - Assess pain using appropriate pain scale  - Administer analgesics based on type and severity of pain and evaluate response  - Implement non-pharmacological ramírez interpreters to assist at discharge as needed  - Consider post-discharge preferences of patient/family/discharge partner  - Complete POLST form as appropriate  - Assess patient's ability to be responsible for managing their own health  - Refer to [facility]

## 2021-07-05 LAB
ANION GAP SERPL CALC-SCNC: 7 MMOL/L (ref 0–18)
BUN BLD-MCNC: 5 MG/DL (ref 7–18)
BUN/CREAT SERPL: 8.9 (ref 10–20)
CALCIUM BLD-MCNC: 9 MG/DL (ref 8.5–10.1)
CHLORIDE SERPL-SCNC: 100 MMOL/L (ref 98–112)
CO2 SERPL-SCNC: 25 MMOL/L (ref 21–32)
CREAT BLD-MCNC: 0.56 MG/DL
DEPRECATED RDW RBC AUTO: 63.3 FL (ref 35.1–46.3)
ERYTHROCYTE [DISTWIDTH] IN BLOOD BY AUTOMATED COUNT: 20.6 % (ref 11–15)
GLUCOSE BLD-MCNC: 101 MG/DL (ref 70–99)
HAV IGM SER QL: 1.8 MG/DL (ref 1.6–2.6)
HCT VFR BLD AUTO: 24.9 %
HGB BLD-MCNC: 8 G/DL
IRON SATURATION: 16 %
IRON SERPL-MCNC: 36 UG/DL
MCH RBC QN AUTO: 27.3 PG (ref 26–34)
MCHC RBC AUTO-ENTMCNC: 32.1 G/DL (ref 31–37)
MCV RBC AUTO: 85 FL
OSMOLALITY SERPL CALC.SUM OF ELEC: 271 MOSM/KG (ref 275–295)
PLATELET # BLD AUTO: 142 10(3)UL (ref 150–450)
POTASSIUM SERPL-SCNC: 3.3 MMOL/L (ref 3.5–5.1)
RBC # BLD AUTO: 2.93 X10(6)UL
SODIUM SERPL-SCNC: 132 MMOL/L (ref 136–145)
TOTAL IRON BINDING CAPACITY: 221 UG/DL (ref 240–450)
TRANSFERRIN SERPL-MCNC: 148 MG/DL (ref 200–360)
WBC # BLD AUTO: 4.2 X10(3) UL (ref 4–11)

## 2021-07-05 PROCEDURE — 99232 SBSQ HOSP IP/OBS MODERATE 35: CPT | Performed by: HOSPITALIST

## 2021-07-05 RX ORDER — ACETAMINOPHEN 500 MG
1000 TABLET ORAL EVERY 6 HOURS PRN
Qty: 50 TABLET | Refills: 0 | Status: SHIPPED | OUTPATIENT
Start: 2021-07-05 | End: 2021-09-03

## 2021-07-05 RX ORDER — POTASSIUM CHLORIDE 20 MEQ/1
40 TABLET, EXTENDED RELEASE ORAL ONCE
Status: COMPLETED | OUTPATIENT
Start: 2021-07-05 | End: 2021-07-05

## 2021-07-05 RX ORDER — CODEINE PHOSPHATE AND GUAIFENESIN 10; 100 MG/5ML; MG/5ML
5 SOLUTION ORAL EVERY 4 HOURS PRN
Qty: 40 ML | Refills: 0 | Status: SHIPPED | OUTPATIENT
Start: 2021-07-05 | End: 2021-09-03 | Stop reason: ALTCHOICE

## 2021-07-05 RX ORDER — MAGNESIUM OXIDE 400 MG (241.3 MG MAGNESIUM) TABLET
400 TABLET ONCE
Status: COMPLETED | OUTPATIENT
Start: 2021-07-05 | End: 2021-07-05

## 2021-07-05 RX ORDER — TRAMADOL HYDROCHLORIDE 50 MG/1
50 TABLET ORAL EVERY 6 HOURS PRN
Qty: 20 TABLET | Refills: 0 | Status: SHIPPED | OUTPATIENT
Start: 2021-07-05 | End: 2021-09-03 | Stop reason: ALTCHOICE

## 2021-07-05 RX ORDER — SIMETHICONE 80 MG
80 TABLET,CHEWABLE ORAL 4 TIMES DAILY PRN
Status: DISCONTINUED | OUTPATIENT
Start: 2021-07-05 | End: 2021-07-06

## 2021-07-05 RX ORDER — ENOXAPARIN SODIUM 100 MG/ML
40 INJECTION SUBCUTANEOUS DAILY
Qty: 24 EACH | Refills: 0 | Status: SHIPPED | OUTPATIENT
Start: 2021-07-06 | End: 2021-07-30

## 2021-07-05 NOTE — PLAN OF CARE
Scheduled tylenol and PRN tramadol. PRN robitussin. Passing flatus. Had small bowel movement overnight. Ambulated in hallway. Up to chair. Voiding. Tolerating PO intake.               Problem: Patient Centered Care  Goal: Patient preferences are identifi Anticipate increased pain with activity and pre-medicate as appropriate  Outcome: Progressing     Problem: RISK FOR INFECTION - ADULT  Goal: Absence of fever/infection during anticipated neutropenic period  Description: INTERVENTIONS  - Monitor WBC  - Admi vomiting  Description: INTERVENTIONS:  - Maintain adequate hydration with IV or PO as ordered and tolerated  - Nasogastric tube to low intermittent suction as ordered  - Evaluate effectiveness of ordered antiemetic medications  - Provide nonpharmacologic c

## 2021-07-05 NOTE — PHYSICAL THERAPY NOTE
PHYSICAL THERAPY EVALUATION - INPATIENT     Room Number: 440/440-A  Evaluation Date: 7/5/2021  Type of Evaluation: Initial   Physician Order: PT Eval and Treat    Presenting Problem: Pt with rectosigmoid CA , pt is s/p robotic assisted resection with anas admission. Pt demonstrated mod indep in room ambulation with no AD. Pt completed 440 ft x 1 ambulation with supervision for hospital setting with good tolerance and no LOB.  Pt able to up down 8 stairs with rail, safety education for stairs provided with vaughn Improving   - Could be GERD related or postnasal cough vs related to valsartan.    - PPI IV.   - guaifenesin/codeine PRN  - IS  - consider changing ARB outpt      Acute blood loss anemia  - Hb on admit 10.5 today 8.0  - rpt CBC in AM  - iron panel reviewe and LE ROM and strength not formally assessed ---Fxn mobility assessment reveals grossly WFL ROM of B UE and LE and a pt reported  mild generalized  weakness related to HPI . Pt does not require an AD with a steady and stable gait.   Pt with no assistance n GOALS    Goals to be met by:  1 week   Patient Goal Patient's self-stated goal is: home    Goal #1 Patient is able to demonstrate supine - sit EOB @ level: modified independent     Goal #1   Current Status    Goal #2 Patient is able to demonstrate transfer

## 2021-07-05 NOTE — PLAN OF CARE
Patient alert and oriented. On room air, productive cough PRN robitussin given. Pain controlled with scheduled Tylenol. Denies nausea or vomiting. Remote telemetry in place, no calls. Abdominal incisions DI. Voiding freely.  1 bowel movement during day shif Administer analgesics based on type and severity of pain and evaluate response  - Implement non-pharmacological measures as appropriate and evaluate response  - Consider cultural and social influences on pain and pain management  - Manage/alleviate anxiety appropriate  - Assess patient's ability to be responsible for managing their own health  - Refer to Case Management Department for coordinating discharge planning if the patient needs post-hospital services based on physician/LIP order or complex needs rel

## 2021-07-05 NOTE — PROGRESS NOTES
[facility]    Progress Note    Alli Maldonado Patient Status:  Inpatient    1950 MRN [id number]   Location [facility] 4W/SW/SE Attending Juliet Hou MD   Hosp Day # 3 PCP Meagan Martinez MD     CC: rectosigmoid ad Daily   • simethicone  80 mg Oral TID CC and HS       Current PRN Inpatient Meds:      benzonatate, traMADol HCl, guaiFENesin-codeine, zolpidem, ondansetron HCl, phenol, oxyCODONE HCl, benzocaine-menthol    Results:     Recent Labs   Lab 07/03/21  0952 07/ panel reviewed will give venofer today   - if Hb < 7.0 transfuse PRBC           Quality:  · Diet: per surgery   · PT/OT: pending   · DVT Prophylaxis: Lovenox   · CODE status: Full.    · Dispo: per clinical course home tomorrow       Greater than 35 minutes

## 2021-07-05 NOTE — PROGRESS NOTES
POD#3    Feels well, pain controlled  Tolerating diet  +flatus, +BM    Blood pressure (!) 141/95, pulse 77, temperature 97.8 °F (36.6 °C), temperature source Oral, resp. rate 18, height 1.727 m (5' 8\"), weight 70.3 kg (155 lb), SpO2 99 %.       Intake/Outp

## 2021-07-06 VITALS
HEART RATE: 78 BPM | HEIGHT: 68 IN | RESPIRATION RATE: 18 BRPM | OXYGEN SATURATION: 99 % | WEIGHT: 155 LBS | BODY MASS INDEX: 23.49 KG/M2 | DIASTOLIC BLOOD PRESSURE: 79 MMHG | SYSTOLIC BLOOD PRESSURE: 127 MMHG | TEMPERATURE: 98 F

## 2021-07-06 LAB
ANION GAP SERPL CALC-SCNC: 8 MMOL/L (ref 0–18)
BASOPHILS # BLD AUTO: 0.02 X10(3) UL (ref 0–0.2)
BASOPHILS NFR BLD AUTO: 0.5 %
BILIRUB UR QL: NEGATIVE
BUN BLD-MCNC: 6 MG/DL (ref 7–18)
BUN/CREAT SERPL: 10 (ref 10–20)
CALCIUM BLD-MCNC: 9.1 MG/DL (ref 8.5–10.1)
CHLORIDE SERPL-SCNC: 101 MMOL/L (ref 98–112)
CLARITY UR: CLEAR
CO2 SERPL-SCNC: 24 MMOL/L (ref 21–32)
COLOR UR: YELLOW
CREAT BLD-MCNC: 0.6 MG/DL
DEPRECATED RDW RBC AUTO: 61.8 FL (ref 35.1–46.3)
EOSINOPHIL # BLD AUTO: 0.3 X10(3) UL (ref 0–0.7)
EOSINOPHIL NFR BLD AUTO: 7.9 %
ERYTHROCYTE [DISTWIDTH] IN BLOOD BY AUTOMATED COUNT: 20.6 % (ref 11–15)
GLUCOSE BLD-MCNC: 102 MG/DL (ref 70–99)
GLUCOSE UR-MCNC: NEGATIVE MG/DL
HAV IGM SER QL: 1.7 MG/DL (ref 1.6–2.6)
HCT VFR BLD AUTO: 26.3 %
HGB BLD-MCNC: 8.6 G/DL
HGB UR QL STRIP.AUTO: NEGATIVE
IMM GRANULOCYTES # BLD AUTO: 0.04 X10(3) UL (ref 0–1)
IMM GRANULOCYTES NFR BLD: 1.1 %
KETONES UR-MCNC: NEGATIVE MG/DL
LEUKOCYTE ESTERASE UR QL STRIP.AUTO: NEGATIVE
LYMPHOCYTES # BLD AUTO: 0.98 X10(3) UL (ref 1–4)
LYMPHOCYTES NFR BLD AUTO: 25.8 %
MCH RBC QN AUTO: 27.7 PG (ref 26–34)
MCHC RBC AUTO-ENTMCNC: 32.7 G/DL (ref 31–37)
MCV RBC AUTO: 84.6 FL
MONOCYTES # BLD AUTO: 0.56 X10(3) UL (ref 0.1–1)
MONOCYTES NFR BLD AUTO: 14.7 %
NEUTROPHILS # BLD AUTO: 1.9 X10 (3) UL (ref 1.5–7.7)
NEUTROPHILS # BLD AUTO: 1.9 X10(3) UL (ref 1.5–7.7)
NEUTROPHILS NFR BLD AUTO: 50 %
NITRITE UR QL STRIP.AUTO: NEGATIVE
OSMOLALITY SERPL CALC.SUM OF ELEC: 274 MOSM/KG (ref 275–295)
PH UR: 6 [PH] (ref 5–8)
PLATELET # BLD AUTO: 190 10(3)UL (ref 150–450)
POTASSIUM SERPL-SCNC: 3.5 MMOL/L (ref 3.5–5.1)
PROT UR-MCNC: 30 MG/DL
RBC # BLD AUTO: 3.11 X10(6)UL
SODIUM SERPL-SCNC: 133 MMOL/L (ref 136–145)
SP GR UR STRIP: 1.03 (ref 1–1.03)
UROBILINOGEN UR STRIP-ACNC: <2
WBC # BLD AUTO: 3.8 X10(3) UL (ref 4–11)

## 2021-07-06 PROCEDURE — 99232 SBSQ HOSP IP/OBS MODERATE 35: CPT | Performed by: HOSPITALIST

## 2021-07-06 RX ORDER — ZOLPIDEM TARTRATE 5 MG/1
5 TABLET ORAL NIGHTLY PRN
Qty: 7 TABLET | Refills: 0 | Status: SHIPPED | OUTPATIENT
Start: 2021-07-06 | End: 2021-09-03

## 2021-07-06 RX ORDER — MAGNESIUM OXIDE 400 MG (241.3 MG MAGNESIUM) TABLET
400 TABLET ONCE
Status: COMPLETED | OUTPATIENT
Start: 2021-07-06 | End: 2021-07-06

## 2021-07-06 RX ORDER — PANTOPRAZOLE SODIUM 20 MG/1
20 TABLET, DELAYED RELEASE ORAL DAILY
Qty: 30 TABLET | Refills: 0 | Status: SHIPPED | OUTPATIENT
Start: 2021-07-06 | End: 2021-07-29

## 2021-07-06 NOTE — PLAN OF CARE
Problem: Patient Centered Care  Goal: Patient preferences are identified and integrated in the patient's plan of care  Description: Interventions:  - What would you like us to know as we care for you?  I live at home with my wife  - Provide timely, comple anticipated neutropenic period  Description: INTERVENTIONS  - Monitor WBC  - Administer growth factors as ordered  - Implement neutropenic guidelines  Outcome: Progressing     Problem: SAFETY ADULT - FALL  Goal: Free from fall injury  Description: Gilda Lopez Evaluate effectiveness of ordered antiemetic medications  - Provide nonpharmacologic comfort measures as appropriate  - Advance diet as tolerated, if ordered  - Obtain nutritional consult as needed  - Evaluate fluid balance  Outcome: Progressing  Goal: Matti Morel indicated  Outcome: Progressing     No acute changes at this time. Patient continues to have productive coughing episodes. Medicated with Robitussin PRN. On scheduled Tylenol. Lap sites x 5, with  intact dressing. Plan of care reviewed.  Safety measures i

## 2021-07-06 NOTE — PLAN OF CARE
Patient stable, vital signs stable. Patient pain controlled, patient tolerating diet well. Patient up in room independently. Patient cleared for discharge.  Reviewed discharge instructions and entirety of After Visit Summary with patient-- patient verbalize appropriate and evaluate response  - Consider cultural and social influences on pain and pain management  - Manage/alleviate anxiety  - Utilize distraction and/or relaxation techniques  - Monitor for opioid side effects  - Notify MD/LIP if interventions un Advance diet as tolerated, if ordered  - Obtain nutritional consult as needed  - Evaluate fluid balance  Outcome: Adequate for Discharge  Goal: Maintains or returns to baseline bowel function  Description: INTERVENTIONS:  - Assess bowel function  - Christiana Madrigal

## 2021-07-06 NOTE — PROGRESS NOTES
Patient with change in color of urine-- Dr. Lu Franks made aware and urine sent-- UA does not show blood in urine-- ok to discharge-- patient advised to keep hydrating- patient verbalizes understanding.

## 2021-07-06 NOTE — PROGRESS NOTES
POD#4    Feels well, pain controlled  Tolerating diet  +flatus, +BM    Blood pressure (!) 147/92, pulse 78, temperature 98 °F (36.7 °C), temperature source Oral, resp. rate 18, height 1.727 m (5' 8\"), weight 70.3 kg (155 lb), SpO2 99 %.       Intake/Output

## 2021-07-06 NOTE — PHYSICAL THERAPY NOTE
Non billable PT follow up visit. Pt was seen yesterday by this PT doing well. Per RN staff and pt , pt has been up ambulating in hallway and room with no difficulty and no AD needed. Therapy contact visit.    Reviewed therapy pt education instruc

## 2021-07-06 NOTE — DISCHARGE SUMMARY
[facility] Hospitalist Discharge Summary   Patient ID:  Alli Maldonado  [id number]  8 year old  8/27/1950    Admit date: 7/2/2021  Discharge date: 7/6/2021  Primary Care Physician: Meagan Martinez MD   Attending Physician: Juliet Hou MD   Consu low anterior resection with primary end-to-end anastomosis, ICG interrogation of bowel vascularity, Sigmoidoscopy 7/2  - cont pain control  - Encourage incentive spirometry  - Advancing diet per surgery  - Appreciate further surgery recs     H/o prostate C amLODIPine Besylate-Valsartan  MG Tabs  Commonly known as: EXFORGE  Take 1 tablet by mouth daily.      melatonin 5 MG Caps        STOP taking these medications    CoQ10 100 MG Caps     Cranberry 500 MG Caps     DANDELION OR     Mark Multivitamin for M remove part of it, so now this tank is smaller. This means that it can't hold as much stool. You may have a lot of small bowel movements because your rectum can't hold a lot of stool. Over time, your rectum will stretch and be able to hold more stool.  This moistened flushable wipe) instead. Caring for Your Incision  It's normal for the skin below your incision to feel numb. This happens because some of the nerves were cut during your surgery. The numbness will go away over time.   Your surgeon used dissolvab experience the following symptoms:  • Fever of 100.4° F (38°C) or higher  • Cloudy or smelly drainage from the incision site  • The skin around your incision is warm/red/swollen  • Sudden increase in pain or new pain  • Shaking chills  • Fast pulse  • Tamanna Score plan as outlined    Rebekah Degroot MD  Hospitalist  7/6/2021

## 2021-07-07 ENCOUNTER — TELEPHONE (OUTPATIENT)
Dept: SURGERY | Facility: CLINIC | Age: 71
End: 2021-07-07

## 2021-07-07 NOTE — TELEPHONE ENCOUNTER
Doing well  On tylenol  Discussed path with him    Skinny Tello MD  [facility] General Surgical Oncology  [place] [address]  Pager [phone]  [email or web address]. [email or web address]

## 2021-07-09 ENCOUNTER — TELEPHONE (OUTPATIENT)
Dept: SURGERY | Facility: CLINIC | Age: 71
End: 2021-07-09

## 2021-07-09 NOTE — TELEPHONE ENCOUNTER
Spoke with pt's daughter Kennon Curling, confirmed appts for 7/14 with Dr. Evans Ulloa at 11:30am and Dr. Jessica Gracia at 12:00pm Pt appreciated the phone call.

## 2021-07-12 ENCOUNTER — APPOINTMENT (OUTPATIENT)
Dept: HEMATOLOGY/ONCOLOGY | Facility: HOSPITAL | Age: 71
End: 2021-07-12
Attending: INTERNAL MEDICINE
Payer: MEDICARE

## 2021-07-14 ENCOUNTER — OFFICE VISIT (OUTPATIENT)
Dept: SURGERY | Facility: CLINIC | Age: 71
End: 2021-07-14
Payer: MEDICARE

## 2021-07-14 ENCOUNTER — OFFICE VISIT (OUTPATIENT)
Dept: HEMATOLOGY/ONCOLOGY | Facility: HOSPITAL | Age: 71
End: 2021-07-14
Attending: INTERNAL MEDICINE
Payer: MEDICARE

## 2021-07-14 VITALS
SYSTOLIC BLOOD PRESSURE: 141 MMHG | BODY MASS INDEX: 23.04 KG/M2 | RESPIRATION RATE: 18 BRPM | HEIGHT: 68 IN | WEIGHT: 152 LBS | HEART RATE: 92 BPM | OXYGEN SATURATION: 100 % | TEMPERATURE: 98 F | DIASTOLIC BLOOD PRESSURE: 72 MMHG

## 2021-07-14 DIAGNOSIS — D50.9 IRON DEFICIENCY ANEMIA, UNSPECIFIED IRON DEFICIENCY ANEMIA TYPE: ICD-10-CM

## 2021-07-14 DIAGNOSIS — C19 RECTOSIGMOID CANCER (HCC): ICD-10-CM

## 2021-07-14 DIAGNOSIS — C20 ADENOCARCINOMA OF RECTUM (HCC): Primary | ICD-10-CM

## 2021-07-14 DIAGNOSIS — C61 PROSTATE CANCER (HCC): Primary | ICD-10-CM

## 2021-07-14 LAB
ANION GAP SERPL CALC-SCNC: 9 MMOL/L (ref 0–18)
BASOPHILS # BLD AUTO: 0.05 X10(3) UL (ref 0–0.2)
BASOPHILS NFR BLD AUTO: 0.8 %
BUN BLD-MCNC: 9 MG/DL (ref 7–18)
BUN/CREAT SERPL: 12.2 (ref 10–20)
CALCIUM BLD-MCNC: 10 MG/DL (ref 8.5–10.1)
CHLORIDE SERPL-SCNC: 101 MMOL/L (ref 98–112)
CO2 SERPL-SCNC: 25 MMOL/L (ref 21–32)
CREAT BLD-MCNC: 0.74 MG/DL
DEPRECATED RDW RBC AUTO: 65.9 FL (ref 35.1–46.3)
EOSINOPHIL # BLD AUTO: 0.3 X10(3) UL (ref 0–0.7)
EOSINOPHIL NFR BLD AUTO: 4.8 %
ERYTHROCYTE [DISTWIDTH] IN BLOOD BY AUTOMATED COUNT: 21 % (ref 11–15)
GLUCOSE BLD-MCNC: 139 MG/DL (ref 70–99)
HCT VFR BLD AUTO: 33.6 %
HGB BLD-MCNC: 11 G/DL
IMM GRANULOCYTES # BLD AUTO: 0.02 X10(3) UL (ref 0–1)
IMM GRANULOCYTES NFR BLD: 0.3 %
LYMPHOCYTES # BLD AUTO: 1.31 X10(3) UL (ref 1–4)
LYMPHOCYTES NFR BLD AUTO: 20.9 %
MCH RBC QN AUTO: 28.6 PG (ref 26–34)
MCHC RBC AUTO-ENTMCNC: 32.7 G/DL (ref 31–37)
MCV RBC AUTO: 87.3 FL
MONOCYTES # BLD AUTO: 0.58 X10(3) UL (ref 0.1–1)
MONOCYTES NFR BLD AUTO: 9.3 %
NEUTROPHILS # BLD AUTO: 4 X10 (3) UL (ref 1.5–7.7)
NEUTROPHILS # BLD AUTO: 4 X10(3) UL (ref 1.5–7.7)
NEUTROPHILS NFR BLD AUTO: 63.9 %
OSMOLALITY SERPL CALC.SUM OF ELEC: 281 MOSM/KG (ref 275–295)
PLATELET # BLD AUTO: 403 10(3)UL (ref 150–450)
PLATELET MORPHOLOGY: NORMAL
POTASSIUM SERPL-SCNC: 4 MMOL/L (ref 3.5–5.1)
RBC # BLD AUTO: 3.85 X10(6)UL
SODIUM SERPL-SCNC: 135 MMOL/L (ref 136–145)
WBC # BLD AUTO: 6.3 X10(3) UL (ref 4–11)

## 2021-07-14 PROCEDURE — 99024 POSTOP FOLLOW-UP VISIT: CPT | Performed by: SURGERY

## 2021-07-14 PROCEDURE — 1111F DSCHRG MED/CURRENT MED MERGE: CPT | Performed by: SURGERY

## 2021-07-14 PROCEDURE — 99215 OFFICE O/P EST HI 40 MIN: CPT | Performed by: INTERNAL MEDICINE

## 2021-07-14 NOTE — PROGRESS NOTES
Cancer Center Progress Note    Patient Name: Franco Jeffery   YOB: 1950   Medical Record Number: [id number]   Attending Physician: Pratima Sanders M.D.      Chief Complaint:  State cancer rectosigmoid cancer    History of Present Illness:  Cancer hi Breast Cancer Sister         +BRCA       Social History:  Social History    Socioeconomic History      Marital status:       Spouse name: Not on file      Number of children: Not on file      Years of education: Not on file      Highest education le MG/0.4ML Subcutaneous Solution, Inject 0.4 mL (40 mg total) into the skin daily for 24 days. , Disp: 24 each, Rfl: 0  guaiFENesin-codeine 100-10 MG/5ML Oral Solution, Take 5 mL by mouth every 4 (four) hours as needed for cough. , Disp: 40 mL, Rfl: 0  traMADo Continuous PRN, Patricio Sainz MD, 3,000 mL at 07/02/21 0930  [COMPLETED] Gadoterate Meglumine (DOTAREM) 7.5 MMOL/15ML injection 15 mL, 15 mL, Intravenous, ONCE PRN, Annelise Johnson MD, 15 mL at 06/23/21 1358  Degarelix Acetate (FIRMAGON) sub-q injection patient.     Impression and Plan:  71-year-old male with family history of BRCA1 germline mutation, alcoholic liver disease, severe iron deficiency anemia, being evaluated by Hematology/Oncology for localized NCCN high-risk prostate cancer, diagnosed April

## 2021-07-15 NOTE — PROGRESS NOTES
[facility]    Patient Name:  Jaspreet Muhammad   YOB: 1950   Gender:  Male   Appt Date:  7/14/2021   Provider:  Debi Chairez MD   Insurance:  [address] [place] [address] Oral Tab, Take 2 tablets (1,000 mg total) by mouth every 6 (six) hours as needed for Pain., Disp: 50 tablet, Rfl: 0  •  amLODIPine Besylate-Valsartan  MG Oral Tab, Take 1 tablet by mouth daily. , Disp: 90 tablet, Rfl: 0  •  melatonin 5 MG Oral Cap, Ta Systems:  Review of Systems   Constitutional: Negative for activity change, appetite change, chills, fatigue, fever and unexpected weight change. HENT: Negative for congestion and trouble swallowing. Eyes: Negative for discharge and redness.    Nano Patterson Addendum 1         Immunohistochemistry Testing for Mismatch Repair Proteins p[erformed on block A5:  MLH1:   Intact nuclear expression. PMS2:    Intact nuclear expression. MSH2:   Intact nuclear expression.   MSH6:   Intact nuclear expression.    carcinoma.     C.  Proximal donut:  · Circular segment of colonic epithelium and subepithelial tissue with mild submucosal hemorrhage, negative for carcinoma.     Comment:  MMR profile by immunohistochemistry is pending and results will be issued in a suppl Description     Specimen A is submitted in formalin labeled “Ramya, lower sigmoid, upper rectum - black stitch marks proximal aspectst”  and consists of a segment of colon measuring 10.0 cm in length, ranging in diameter from 1.8 to 2.4 cm.  The specimen is r the proximal margin in relation to proximal margin, possible stapled mesenteric margin and voided/proximal mesorectum/circumferential margin; A4 - en face distal margin; A5-A8 - base of lesion with deepest extent of invasion; A9-A12 - remaining loose/bulk

## 2021-07-26 ENCOUNTER — TELEPHONE (OUTPATIENT)
Dept: FAMILY MEDICINE CLINIC | Facility: CLINIC | Age: 71
End: 2021-07-26

## 2021-07-26 NOTE — TELEPHONE ENCOUNTER
Patient is calling asking if he can get the covid vaccine due to his condition and has other concerns and questions. He is also asking for a refill on Pantoprazole 20 mg. Please advice.

## 2021-07-27 ENCOUNTER — OFFICE VISIT (OUTPATIENT)
Dept: SURGERY | Facility: CLINIC | Age: 71
End: 2021-07-27
Payer: MEDICARE

## 2021-07-27 VITALS
DIASTOLIC BLOOD PRESSURE: 88 MMHG | SYSTOLIC BLOOD PRESSURE: 142 MMHG | BODY MASS INDEX: 23.04 KG/M2 | HEART RATE: 98 BPM | WEIGHT: 152 LBS | HEIGHT: 68 IN

## 2021-07-27 DIAGNOSIS — C61 PROSTATE CANCER (HCC): Primary | ICD-10-CM

## 2021-07-27 PROCEDURE — 99213 OFFICE O/P EST LOW 20 MIN: CPT | Performed by: UROLOGY

## 2021-07-27 NOTE — PROGRESS NOTES
Zehra Corea is a 72year old male. HPI:   Patient presents with: Follow - Up: Pt presents for follow up. Pt states he is here for a follow up due to prostate cancer.  Pt denies any dysuria at this time and has no complaints       72year old male with p Oral Tab Take 1 tablet (5 mg total) by mouth nightly as needed for Sleep. 7 tablet 0   • Pantoprazole Sodium 20 MG Oral Tab EC Take 1 tablet (20 mg total) by mouth daily.  30 tablet 0   • Enoxaparin Sodium 40 MG/0.4ML Subcutaneous Solution Inject 0.4 mL (40

## 2021-07-29 RX ORDER — PANTOPRAZOLE SODIUM 20 MG/1
20 TABLET, DELAYED RELEASE ORAL DAILY
Qty: 30 TABLET | Refills: 0 | Status: SHIPPED | OUTPATIENT
Start: 2021-07-29 | End: 2021-08-24

## 2021-07-29 NOTE — TELEPHONE ENCOUNTER
Left voicemail to call back to discuss re: COVID vaccine. Pantoprazole e-rx to pharmacy for 30 day supply.

## 2021-07-29 NOTE — TELEPHONE ENCOUNTER
Patient called back. He was informed that he is okay to get COVID vaccine. He then inquired where he can get it. Informed him via [facility] website, it will have steps on how to schedule for vaccine, or he can also get it at his local pharmacy.     Also aware

## 2021-08-20 ENCOUNTER — TELEPHONE (OUTPATIENT)
Dept: HEMATOLOGY/ONCOLOGY | Facility: HOSPITAL | Age: 71
End: 2021-08-20

## 2021-08-20 NOTE — TELEPHONE ENCOUNTER
Patient's wife called and she is faxing over the [address] [place] [address] paperwork for Dr Fredo Ford, patient's PCP said is should be filled out by Dr Lesly Garcia office

## 2021-08-23 ENCOUNTER — TELEPHONE (OUTPATIENT)
Dept: HEMATOLOGY/ONCOLOGY | Facility: HOSPITAL | Age: 71
End: 2021-08-23

## 2021-08-23 NOTE — TELEPHONE ENCOUNTER
Patient faxed over family medical leave paperwork, it was placed in [employer]'s mailbox on 8/20.  Patient's wife would like an update on the status of the paperwork

## 2021-08-23 NOTE — TELEPHONE ENCOUNTER
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      Pt has been unde in person. SW left a voicemail with the /[employer]  to alert that the paperwork is completed and an attempt was made to send it to them, but will alert their employee to try to send via alternate methods.       RENU Lafleur,

## 2021-08-24 RX ORDER — PANTOPRAZOLE SODIUM 20 MG/1
TABLET, DELAYED RELEASE ORAL
Qty: 30 TABLET | Refills: 0 | Status: SHIPPED | OUTPATIENT
Start: 2021-08-24 | End: 2021-09-23

## 2021-09-03 ENCOUNTER — OFFICE VISIT (OUTPATIENT)
Dept: HEMATOLOGY/ONCOLOGY | Facility: HOSPITAL | Age: 71
End: 2021-09-03
Attending: INTERNAL MEDICINE
Payer: MEDICARE

## 2021-09-03 VITALS
BODY MASS INDEX: 23.64 KG/M2 | TEMPERATURE: 98 F | DIASTOLIC BLOOD PRESSURE: 87 MMHG | HEIGHT: 68 IN | RESPIRATION RATE: 18 BRPM | SYSTOLIC BLOOD PRESSURE: 169 MMHG | OXYGEN SATURATION: 99 % | HEART RATE: 92 BPM | WEIGHT: 156 LBS

## 2021-09-03 DIAGNOSIS — D50.9 IRON DEFICIENCY ANEMIA, UNSPECIFIED IRON DEFICIENCY ANEMIA TYPE: ICD-10-CM

## 2021-09-03 DIAGNOSIS — C61 PROSTATE CANCER (HCC): Primary | ICD-10-CM

## 2021-09-03 DIAGNOSIS — Z79.818 ANDROGEN DEPRIVATION THERAPY: ICD-10-CM

## 2021-09-03 DIAGNOSIS — D50.0 IRON DEFICIENCY ANEMIA DUE TO CHRONIC BLOOD LOSS: ICD-10-CM

## 2021-09-03 DIAGNOSIS — C19 RECTOSIGMOID CANCER (HCC): ICD-10-CM

## 2021-09-03 DIAGNOSIS — E61.1 IRON DEFICIENCY: ICD-10-CM

## 2021-09-03 LAB
BASOPHILS # BLD AUTO: 0.03 X10(3) UL (ref 0–0.2)
BASOPHILS NFR BLD AUTO: 0.6 %
DEPRECATED RDW RBC AUTO: 40.4 FL (ref 35.1–46.3)
EOSINOPHIL # BLD AUTO: 0.25 X10(3) UL (ref 0–0.7)
EOSINOPHIL NFR BLD AUTO: 4.6 %
ERYTHROCYTE [DISTWIDTH] IN BLOOD BY AUTOMATED COUNT: 12.6 % (ref 11–15)
HCT VFR BLD AUTO: 41.1 %
HGB BLD-MCNC: 14.3 G/DL
IMM GRANULOCYTES # BLD AUTO: 0.01 X10(3) UL (ref 0–1)
IMM GRANULOCYTES NFR BLD: 0.2 %
IRON SATURATION: 31 %
IRON SERPL-MCNC: 103 UG/DL
LYMPHOCYTES # BLD AUTO: 1.15 X10(3) UL (ref 1–4)
LYMPHOCYTES NFR BLD AUTO: 21.2 %
MCH RBC QN AUTO: 30.7 PG (ref 26–34)
MCHC RBC AUTO-ENTMCNC: 34.8 G/DL (ref 31–37)
MCV RBC AUTO: 88.2 FL
MONOCYTES # BLD AUTO: 0.6 X10(3) UL (ref 0.1–1)
MONOCYTES NFR BLD AUTO: 11.1 %
NEUTROPHILS # BLD AUTO: 3.38 X10 (3) UL (ref 1.5–7.7)
NEUTROPHILS # BLD AUTO: 3.38 X10(3) UL (ref 1.5–7.7)
NEUTROPHILS NFR BLD AUTO: 62.3 %
PLATELET # BLD AUTO: 160 10(3)UL (ref 150–450)
PSA SERPL-MCNC: 0.35 NG/ML (ref ?–4)
RBC # BLD AUTO: 4.66 X10(6)UL
TESTOST SERPL-MCNC: 18.11 NG/DL
TOTAL IRON BINDING CAPACITY: 328 UG/DL (ref 240–450)
TRANSFERRIN SERPL-MCNC: 220 MG/DL (ref 200–360)
WBC # BLD AUTO: 5.4 X10(3) UL (ref 4–11)

## 2021-09-03 PROCEDURE — 99215 OFFICE O/P EST HI 40 MIN: CPT | Performed by: INTERNAL MEDICINE

## 2021-09-03 PROCEDURE — 84466 ASSAY OF TRANSFERRIN: CPT

## 2021-09-03 PROCEDURE — 84153 ASSAY OF PSA TOTAL: CPT

## 2021-09-03 PROCEDURE — 36415 COLL VENOUS BLD VENIPUNCTURE: CPT

## 2021-09-03 PROCEDURE — 85025 COMPLETE CBC W/AUTO DIFF WBC: CPT

## 2021-09-03 PROCEDURE — 83540 ASSAY OF IRON: CPT

## 2021-09-03 PROCEDURE — 96402 CHEMO HORMON ANTINEOPL SQ/IM: CPT

## 2021-09-03 PROCEDURE — 84403 ASSAY OF TOTAL TESTOSTERONE: CPT

## 2021-09-03 NOTE — PROGRESS NOTES
Patient here for Lab draw and Lupron injection. Patient concerned, stated he has  hot flashes that come and go. Instructed hot flashes common side effect of Lupron injection. Patient verbalized understanding.     Patient states he had Colon resection on

## 2021-09-03 NOTE — PROGRESS NOTES
Cancer Center Progress Note    Patient Name: Lisa Harvey   YOB: 1950   Medical Record Number: [id number]   Attending Physician: Andrea Boggs M.D.      Chief Complaint:  State cancer rectosigmoid cancer    History of Present Illness:  Cancer hi esophageal Ca   • Cancer Sister    • Breast Cancer Sister         +BRCA       Social History:  Social History    Socioeconomic History      Marital status:       Spouse name: Not on file      Number of children: Not on file      Years of education: palpable lymphadenopathy. Neck is supple. Lymphatics: There is no palpable peripheral lymphadenopathy   Chest: Symmetric expansion, nonlabored breathing  Cardiovascular: Regular with palpable distal pulses   Abdomen: Soft, non tender.    Extremities: No ed High active cancer directed therapy monitoring       Niranjan Kothari MD

## 2021-09-03 NOTE — PATIENT INSTRUCTIONS
USE CITRUCEL DAILY - FOLLOW INSTRUCTIONS ON BOTTLE TO HELP WITH REGULARITY OF BOWEL MOVEMENTS  DRINK PLENTY OF WATER, TEA, JUICE EVERY DAY  GENTLE EXERCISE 2 -3 TIMES PER WEEK

## 2021-09-03 NOTE — PROGRESS NOTES
Discussed with pt and daughter use of citrucel and po fluids as well as gentle exercise and healthy, regular meals to enhance bowel regularity. Provided brochure on [employer] for resources such as diet and exercise programs.

## 2021-09-17 ENCOUNTER — OFFICE VISIT (OUTPATIENT)
Dept: RADIATION ONCOLOGY | Facility: HOSPITAL | Age: 71
End: 2021-09-17
Attending: RADIOLOGY
Payer: MEDICARE

## 2021-09-17 VITALS
DIASTOLIC BLOOD PRESSURE: 71 MMHG | BODY MASS INDEX: 24 KG/M2 | OXYGEN SATURATION: 98 % | HEART RATE: 87 BPM | RESPIRATION RATE: 16 BRPM | SYSTOLIC BLOOD PRESSURE: 139 MMHG | TEMPERATURE: 98 F | WEIGHT: 160.63 LBS

## 2021-09-17 DIAGNOSIS — C61 PROSTATE CANCER (HCC): Primary | ICD-10-CM

## 2021-09-17 PROCEDURE — 99212 OFFICE O/P EST SF 10 MIN: CPT

## 2021-09-17 NOTE — PATIENT INSTRUCTIONS
You will get a call to schedule a CT simulation. Call [phone] for any radiation questions or concerns.

## 2021-09-17 NOTE — PROGRESS NOTES
Nursing Re- Consult Note    Patient: Kaitlynn Evans  YOB: 1950  Age: 80year old  Radiation Oncologist:Neil Carolene Peabody, MD  Referring Physician: Armida Lawton  Diagnosis:No diagnosis found.   Consult Date: 9/17/2021    History of Pres None  Interventions to reduce barriers:  Consult, Face patient when speaking, Provide support/encouragement, Provide printed materials and Patient to express feelings  Visual aids:  yes  Hearing disability:  no  Dentures:  no     Knowledge Deficit Plan Of

## 2021-09-20 NOTE — CONSULTS
[facility] [place], [place]    PATIENT'S NAME: Luke Lopez   RADIATION ONCOLOGIST: Cassie Lindsay.  Carolene Peabody, MD   PATIENT ACCOUNT #: [de-identified] LOCATION: [address] [place] [address] RECORD #: [id number] YOB: 1950   CONSULTATION DATE: 09/17/2021       RADIATI been recovering from his surgery fairly well. He tolerates the hormone therapy rather well also. He does have significant bowel frequency ever since his surgery, but this is tolerable. He denied any other particular issues or complaints at this time.   I administered. To that extent, I do recommend treatment as per NCCN Guidelines, and he should receive radiation therapy to the prostate and I would include the lower pelvic lymph nodes.   The prostate will receive 7000 cGy with a lower dose of 5040 cGy socorro

## 2021-09-23 RX ORDER — PANTOPRAZOLE SODIUM 20 MG/1
TABLET, DELAYED RELEASE ORAL
Qty: 30 TABLET | Refills: 0 | Status: SHIPPED | OUTPATIENT
Start: 2021-09-23 | End: 2021-10-22

## 2021-09-24 ENCOUNTER — APPOINTMENT (OUTPATIENT)
Dept: RADIATION ONCOLOGY | Facility: HOSPITAL | Age: 71
End: 2021-09-24
Attending: RADIOLOGY
Payer: MEDICARE

## 2021-09-24 PROCEDURE — 77399 UNLISTED PX MED RADJ PHYSICS: CPT | Performed by: RADIOLOGY

## 2021-09-24 PROCEDURE — 77334 RADIATION TREATMENT AID(S): CPT | Performed by: RADIOLOGY

## 2021-09-28 PROCEDURE — 77338 DESIGN MLC DEVICE FOR IMRT: CPT | Performed by: RADIOLOGY

## 2021-09-28 PROCEDURE — 77300 RADIATION THERAPY DOSE PLAN: CPT | Performed by: RADIOLOGY

## 2021-09-28 PROCEDURE — 77301 RADIOTHERAPY DOSE PLAN IMRT: CPT | Performed by: RADIOLOGY

## 2021-10-01 ENCOUNTER — APPOINTMENT (OUTPATIENT)
Dept: RADIATION ONCOLOGY | Facility: HOSPITAL | Age: 71
End: 2021-10-01
Attending: RADIOLOGY
Payer: MEDICARE

## 2021-10-04 DIAGNOSIS — C61 PROSTATE CANCER (HCC): Primary | ICD-10-CM

## 2021-10-07 ENCOUNTER — LAB ENCOUNTER (OUTPATIENT)
Dept: LAB | Facility: HOSPITAL | Age: 71
End: 2021-10-07
Attending: RADIOLOGY
Payer: MEDICARE

## 2021-10-07 ENCOUNTER — APPOINTMENT (OUTPATIENT)
Dept: RADIATION ONCOLOGY | Facility: HOSPITAL | Age: 71
End: 2021-10-07
Attending: RADIOLOGY
Payer: MEDICARE

## 2021-10-07 ENCOUNTER — DIETICIAN VISIT (OUTPATIENT)
Dept: NUTRITION | Facility: HOSPITAL | Age: 71
End: 2021-10-07

## 2021-10-07 VITALS — BODY MASS INDEX: 24 KG/M2 | WEIGHT: 161 LBS

## 2021-10-07 DIAGNOSIS — C61 PROSTATE CANCER (HCC): ICD-10-CM

## 2021-10-07 PROCEDURE — 77385 HC IMRT SIMPLE: CPT | Performed by: RADIOLOGY

## 2021-10-07 NOTE — PROGRESS NOTES
Oncology Nutrition Assessment    Ht Readings from Last 1 Encounters:  09/03/21 : 172.7 cm (5' 8\")      Wt Readings from Last 1 Encounters:  10/07/21 : 73 kg (161 lb)    BMI Calculated: Body mass index is 24.48 kg/m². Weight History:   Wt Readings from My Point...Exactly Clinical Dietitian [id number]

## 2021-10-08 PROCEDURE — 77385 HC IMRT SIMPLE: CPT | Performed by: RADIOLOGY

## 2021-10-11 PROCEDURE — 77385 HC IMRT SIMPLE: CPT | Performed by: RADIOLOGY

## 2021-10-12 ENCOUNTER — OFFICE VISIT (OUTPATIENT)
Dept: RADIATION ONCOLOGY | Facility: HOSPITAL | Age: 71
End: 2021-10-12
Attending: RADIOLOGY
Payer: MEDICARE

## 2021-10-12 VITALS
SYSTOLIC BLOOD PRESSURE: 172 MMHG | BODY MASS INDEX: 24 KG/M2 | DIASTOLIC BLOOD PRESSURE: 88 MMHG | HEART RATE: 108 BPM | TEMPERATURE: 98 F | OXYGEN SATURATION: 98 % | RESPIRATION RATE: 16 BRPM | WEIGHT: 159 LBS

## 2021-10-12 PROCEDURE — 77385 HC IMRT SIMPLE: CPT | Performed by: RADIOLOGY

## 2021-10-12 NOTE — PROGRESS NOTES
[facility] Radiation Treatment Management Note 1-5    Patient:  Jaspreet Muhammad  Age:  80year old  Visit Diagnosis:  No diagnosis found.   Primary Rad/Onc:  Dr. Zenon Murcia    Site Delivered Dose (cGy) Prescribed Dose (cGy) Fraction # Besylate-Valsartan  MG Oral Tab, Take 1 tablet by mouth daily. , Disp: 90 tablet, Rfl: 0  •  melatonin 5 MG Oral Cap, Take 5 mg by mouth nightly.  , Disp: , Rfl:

## 2021-10-13 ENCOUNTER — TELEPHONE (OUTPATIENT)
Dept: GASTROENTEROLOGY | Facility: CLINIC | Age: 71
End: 2021-10-13

## 2021-10-13 ENCOUNTER — OFFICE VISIT (OUTPATIENT)
Dept: SURGERY | Facility: CLINIC | Age: 71
End: 2021-10-13
Payer: MEDICARE

## 2021-10-13 VITALS
RESPIRATION RATE: 16 BRPM | WEIGHT: 159 LBS | HEIGHT: 68 IN | HEART RATE: 103 BPM | OXYGEN SATURATION: 98 % | BODY MASS INDEX: 24.1 KG/M2 | SYSTOLIC BLOOD PRESSURE: 152 MMHG | DIASTOLIC BLOOD PRESSURE: 85 MMHG

## 2021-10-13 DIAGNOSIS — C19 RECTOSIGMOID CANCER (HCC): Primary | ICD-10-CM

## 2021-10-13 PROCEDURE — 99214 OFFICE O/P EST MOD 30 MIN: CPT | Performed by: SURGERY

## 2021-10-13 PROCEDURE — 77385 HC IMRT SIMPLE: CPT | Performed by: RADIOLOGY

## 2021-10-13 NOTE — PROGRESS NOTES
[facility]    Patient Name:  Tesha Grande   YOB: 1950   Gender:  Male   Appt Date:  10/13/2021   Provider:  Arlene Irizarry MD   Insurance:  [address] [place] [address]  Referring Shukri Adorno Known Allergies     History:  Reviewed:  Past Medical History:   Diagnosis Date   • Anemia    • Cirrhosis ([facility] [address]    • Essential hypertension    [facility]   • Visual impairment     reading glasses      Reviewed:  Past Surgical Histor Abdominal:      General: Bowel sounds are normal. There is no distension. Palpations: Abdomen is soft. Tenderness: There is no abdominal tenderness. Hernia: No hernia is present.        Musculoskeletal:         General: Normal range of miguelangel strongly argues against the presence of Cole syndrome. However, nonsense mutations may occur in up to 5% of tumors, resulting in a non-functional protein that is still detectable by immunohistochemistry. Clinical correlation is advised.   PCR testing can Consistent with a score of 2 [minor LARS], not bothersome at this point. He knows that if he becomes uncomfortable we may start loperamide in the future. Otherwise, discussed with him that surveillance would be under the direction of Dr. Geraldo Shahid.   In genera

## 2021-10-14 PROCEDURE — 77385 HC IMRT SIMPLE: CPT | Performed by: RADIOLOGY

## 2021-10-14 NOTE — TELEPHONE ENCOUNTER
GI staff:    Please recall patient for colonoscopy in June 2022    Then close encounter    Thanks    Pedro Owens MD  [facility], [facility] - Gastroenterology  10/13/2021  7:10 PM

## 2021-10-14 NOTE — TELEPHONE ENCOUNTER
Health maintenance updated. Last colonoscopy done 6/9/21. 1 year recall placed into Pt Outreach, next due on June 2022 per Dr. Ángela Perea.

## 2021-10-15 ENCOUNTER — TELEPHONE (OUTPATIENT)
Dept: HEMATOLOGY/ONCOLOGY | Facility: HOSPITAL | Age: 71
End: 2021-10-15

## 2021-10-15 PROCEDURE — 77385 HC IMRT SIMPLE: CPT | Performed by: RADIOLOGY

## 2021-10-15 PROCEDURE — 77336 RADIATION PHYSICS CONSULT: CPT | Performed by: RADIOLOGY

## 2021-10-15 NOTE — TELEPHONE ENCOUNTER
Melanie Christianson called asking to speak with Izzy Farmer LCSW regarding [place] paperwork. Melanie Christianson is requesting a call back at [phone].

## 2021-10-15 NOTE — TELEPHONE ENCOUNTER
10/15: Call received from pt's wife requesting an extension on her [place] paperwork for another semester (as she is a ).  SW placed call to the pt's wife, who states she needs both the formal paperwork completed and the supplement letter,

## 2021-10-18 ENCOUNTER — OFFICE VISIT (OUTPATIENT)
Dept: HEMATOLOGY/ONCOLOGY | Facility: HOSPITAL | Age: 71
End: 2021-10-18
Attending: INTERNAL MEDICINE
Payer: MEDICARE

## 2021-10-18 VITALS
DIASTOLIC BLOOD PRESSURE: 78 MMHG | OXYGEN SATURATION: 97 % | BODY MASS INDEX: 24.4 KG/M2 | TEMPERATURE: 98 F | SYSTOLIC BLOOD PRESSURE: 152 MMHG | RESPIRATION RATE: 18 BRPM | HEART RATE: 81 BPM | WEIGHT: 161 LBS | HEIGHT: 68 IN

## 2021-10-18 DIAGNOSIS — C19 RECTOSIGMOID CANCER (HCC): ICD-10-CM

## 2021-10-18 DIAGNOSIS — C61 PROSTATE CANCER (HCC): Primary | ICD-10-CM

## 2021-10-18 DIAGNOSIS — Z51.11 CHEMOTHERAPY MANAGEMENT, ENCOUNTER FOR: ICD-10-CM

## 2021-10-18 DIAGNOSIS — Z79.818 ANDROGEN DEPRIVATION THERAPY: ICD-10-CM

## 2021-10-18 DIAGNOSIS — D50.9 IRON DEFICIENCY ANEMIA, UNSPECIFIED IRON DEFICIENCY ANEMIA TYPE: ICD-10-CM

## 2021-10-18 PROCEDURE — 77385 HC IMRT SIMPLE: CPT | Performed by: RADIOLOGY

## 2021-10-18 PROCEDURE — 99215 OFFICE O/P EST HI 40 MIN: CPT | Performed by: INTERNAL MEDICINE

## 2021-10-18 RX ORDER — ABIRATERONE ACETATE 250 MG/1
1000 TABLET ORAL DAILY
Qty: 120 TABLET | Refills: 11 | Status: SHIPPED | OUTPATIENT
Start: 2021-10-18 | End: 2021-11-17

## 2021-10-18 NOTE — PROGRESS NOTES
Cancer Center Progress Note    Patient Name: Joann Hilton   YOB: 1950   Medical Record Number: [id number]   Attending Physician: Elvira Martinez M.D.      Chief Complaint:  State cancer rectosigmoid cancer    History of Present Illness:  Cancer hi ENDOSCOPY,EXAM  05/19/2021       Family History:  Family History   Problem Relation Age of Onset   • Cancer Father         esophageal Ca   • Cancer Sister    • Breast Cancer Sister         +BRCA       Social History:  Social History    Socioeconomic Histor Mood and affect appropriate  HEENT: EOMs intact. PERRL. Oropharynx is clear. Neck: No JVD. No palpable lymphadenopathy. Neck is supple. Lymphatics:  There is no palpable peripheral lymphadenopathy   Chest: Symmetric expansion, nonlabored breathing  Cardi based on T stage and Kusum)  –We have placed a genetics consultation family has not yet set this up  –For rectosigmoid cancer repeat CEA at follow-up CT chest abdomen and pelvis July 2022 repeat colonoscopy July 2022       Risk assessment: High active ca

## 2021-10-19 ENCOUNTER — OFFICE VISIT (OUTPATIENT)
Dept: RADIATION ONCOLOGY | Facility: HOSPITAL | Age: 71
End: 2021-10-19
Attending: RADIOLOGY
Payer: MEDICARE

## 2021-10-19 VITALS
HEART RATE: 92 BPM | WEIGHT: 161.81 LBS | SYSTOLIC BLOOD PRESSURE: 179 MMHG | DIASTOLIC BLOOD PRESSURE: 84 MMHG | RESPIRATION RATE: 18 BRPM | OXYGEN SATURATION: 96 % | TEMPERATURE: 98 F | BODY MASS INDEX: 25 KG/M2

## 2021-10-19 DIAGNOSIS — C61 PROSTATE CANCER (HCC): Primary | ICD-10-CM

## 2021-10-19 PROCEDURE — 77385 HC IMRT SIMPLE: CPT | Performed by: RADIOLOGY

## 2021-10-19 NOTE — PROGRESS NOTES
[facility] Radiation Treatment Management Note 6-10    Patient:  Elizabeth Juarez  Age:  80year old  Visit Diagnosis:    1.  Prostate cancer [facility])      Primary Rad/Onc:  Dr. Geremias Beard    Site Delivered Dose (cGy) Prescribed Dose (cGy

## 2021-10-20 PROCEDURE — 77385 HC IMRT SIMPLE: CPT | Performed by: RADIOLOGY

## 2021-10-21 PROCEDURE — 77385 HC IMRT SIMPLE: CPT | Performed by: RADIOLOGY

## 2021-10-22 ENCOUNTER — TELEPHONE (OUTPATIENT)
Dept: HEMATOLOGY/ONCOLOGY | Facility: HOSPITAL | Age: 71
End: 2021-10-22

## 2021-10-22 PROCEDURE — 77336 RADIATION PHYSICS CONSULT: CPT | Performed by: RADIOLOGY

## 2021-10-22 PROCEDURE — 77385 HC IMRT SIMPLE: CPT | Performed by: RADIOLOGY

## 2021-10-22 RX ORDER — PANTOPRAZOLE SODIUM 20 MG/1
TABLET, DELAYED RELEASE ORAL
Qty: 30 TABLET | Refills: 0 | Status: SHIPPED | OUTPATIENT
Start: 2021-10-22 | End: 2021-11-24

## 2021-10-22 NOTE — TELEPHONE ENCOUNTER
Patient confirms he has been contacted by [employer] and copay, although high, is affordable for him. He prefers not to apply for copay assistance. Copay $200/month.    Delivery will be done next week and upon receipt he will come in with drug for education

## 2021-10-25 PROCEDURE — 77385 HC IMRT SIMPLE: CPT | Performed by: RADIOLOGY

## 2021-10-26 ENCOUNTER — OFFICE VISIT (OUTPATIENT)
Dept: RADIATION ONCOLOGY | Facility: HOSPITAL | Age: 71
End: 2021-10-26
Attending: RADIOLOGY
Payer: MEDICARE

## 2021-10-26 VITALS
HEART RATE: 75 BPM | BODY MASS INDEX: 25 KG/M2 | OXYGEN SATURATION: 97 % | DIASTOLIC BLOOD PRESSURE: 76 MMHG | SYSTOLIC BLOOD PRESSURE: 145 MMHG | TEMPERATURE: 98 F | RESPIRATION RATE: 18 BRPM | WEIGHT: 163 LBS

## 2021-10-26 DIAGNOSIS — C61 PROSTATE CANCER (HCC): Primary | ICD-10-CM

## 2021-10-26 PROCEDURE — 77385 HC IMRT SIMPLE: CPT | Performed by: RADIOLOGY

## 2021-10-26 NOTE — PROGRESS NOTES
[facility] Radiation Treatment Management Note 11-15    Patient:  Omer Cummings  Age:  80year old  Visit Diagnosis:    1.  Prostate cancer [facility])      Primary Rad/Onc:  Dr. Saintclair Scholz    Site Delivered Dose (cGy) Prescribed Dose (cG

## 2021-10-27 PROCEDURE — 77385 HC IMRT SIMPLE: CPT | Performed by: RADIOLOGY

## 2021-10-28 PROCEDURE — 77385 HC IMRT SIMPLE: CPT | Performed by: RADIOLOGY

## 2021-10-29 PROCEDURE — 77385 HC IMRT SIMPLE: CPT | Performed by: RADIOLOGY

## 2021-10-29 PROCEDURE — 77336 RADIATION PHYSICS CONSULT: CPT | Performed by: RADIOLOGY

## 2021-11-01 ENCOUNTER — APPOINTMENT (OUTPATIENT)
Dept: RADIATION ONCOLOGY | Facility: HOSPITAL | Age: 71
End: 2021-11-01
Attending: RADIOLOGY
Payer: MEDICARE

## 2021-11-01 PROCEDURE — 77385 HC IMRT SIMPLE: CPT | Performed by: RADIOLOGY

## 2021-11-02 ENCOUNTER — OFFICE VISIT (OUTPATIENT)
Dept: RADIATION ONCOLOGY | Facility: HOSPITAL | Age: 71
End: 2021-11-02
Attending: RADIOLOGY
Payer: MEDICARE

## 2021-11-02 VITALS
SYSTOLIC BLOOD PRESSURE: 158 MMHG | OXYGEN SATURATION: 97 % | HEART RATE: 95 BPM | TEMPERATURE: 98 F | DIASTOLIC BLOOD PRESSURE: 85 MMHG | RESPIRATION RATE: 18 BRPM | BODY MASS INDEX: 25 KG/M2 | WEIGHT: 163 LBS

## 2021-11-02 DIAGNOSIS — C61 PROSTATE CANCER (HCC): Primary | ICD-10-CM

## 2021-11-02 PROCEDURE — 77385 HC IMRT SIMPLE: CPT | Performed by: RADIOLOGY

## 2021-11-02 RX ORDER — LOPERAMIDE HYDROCHLORIDE 2 MG/1
2 CAPSULE ORAL 4 TIMES DAILY PRN
COMMUNITY

## 2021-11-02 NOTE — PROGRESS NOTES
[facility] Radiation Treatment Management Note 16-20    Patient:  Dimitri Carter  Age:  80year old  Visit Diagnosis:    1.  Prostate cancer [facility])      Primary Rad/Onc:  Dr. Sri Morales    Site Delivered Dose (cGy) Prescribed Dose (cG plan    Next visit:  1 week    Dr. Ashli Pinedo

## 2021-11-03 PROCEDURE — 77385 HC IMRT SIMPLE: CPT | Performed by: RADIOLOGY

## 2021-11-04 PROCEDURE — 77385 HC IMRT SIMPLE: CPT | Performed by: RADIOLOGY

## 2021-11-05 PROCEDURE — 77336 RADIATION PHYSICS CONSULT: CPT | Performed by: RADIOLOGY

## 2021-11-05 PROCEDURE — 77385 HC IMRT SIMPLE: CPT | Performed by: RADIOLOGY

## 2021-11-08 ENCOUNTER — TELEPHONE (OUTPATIENT)
Dept: HEMATOLOGY/ONCOLOGY | Facility: HOSPITAL | Age: 71
End: 2021-11-08

## 2021-11-08 DIAGNOSIS — Z79.818 ANDROGEN DEPRIVATION THERAPY: Primary | ICD-10-CM

## 2021-11-08 DIAGNOSIS — C61 PROSTATE CANCER (HCC): ICD-10-CM

## 2021-11-08 PROCEDURE — 77385 HC IMRT SIMPLE: CPT | Performed by: RADIOLOGY

## 2021-11-08 RX ORDER — PREDNISONE 1 MG/1
5 TABLET ORAL DAILY
Qty: 30 TABLET | Refills: 11 | Status: SHIPPED | OUTPATIENT
Start: 2021-11-08

## 2021-11-08 NOTE — TELEPHONE ENCOUNTER
LM that prednisone scrip has been sent to his local pharmacy to start with his abiraterone. Before starting, please contact clinic nurse so start date is known and any questions can be answered regarding the medication.   Follow up appointment for 12/3 is

## 2021-11-08 NOTE — TELEPHONE ENCOUNTER
Patient calling and stated he was to have Medication Prednisone sent to [facility]. Stated he was to take with another medication, but has not yet received the prednisone.     Plz Advise

## 2021-11-09 ENCOUNTER — OFFICE VISIT (OUTPATIENT)
Dept: RADIATION ONCOLOGY | Facility: HOSPITAL | Age: 71
End: 2021-11-09
Attending: RADIOLOGY
Payer: MEDICARE

## 2021-11-09 VITALS
SYSTOLIC BLOOD PRESSURE: 155 MMHG | BODY MASS INDEX: 25 KG/M2 | DIASTOLIC BLOOD PRESSURE: 79 MMHG | HEART RATE: 88 BPM | RESPIRATION RATE: 18 BRPM | WEIGHT: 163.19 LBS | OXYGEN SATURATION: 98 % | TEMPERATURE: 98 F

## 2021-11-09 DIAGNOSIS — C61 PROSTATE CANCER (HCC): Primary | ICD-10-CM

## 2021-11-09 PROCEDURE — 77385 HC IMRT SIMPLE: CPT | Performed by: RADIOLOGY

## 2021-11-09 NOTE — PATIENT INSTRUCTIONS
Follow up with Dr. Marily Marley in 3 months. Torie Bolton will call you to schedule your follow up appointment. Please call [phone] with any radiation questions.

## 2021-11-09 NOTE — PROGRESS NOTES
[facility] Radiation Treatment Management Note 21-25    Patient:  Amada Common  Age:  80year old  Visit Diagnosis:    1.  Prostate cancer [facility])      Primary Rad/Onc:  Dr. Brittni Enciso    Site Delivered Dose (cGy) Prescribed Dose (cG

## 2021-11-10 PROCEDURE — 77385 HC IMRT SIMPLE: CPT | Performed by: RADIOLOGY

## 2021-11-11 PROCEDURE — 77385 HC IMRT SIMPLE: CPT | Performed by: RADIOLOGY

## 2021-11-12 PROCEDURE — 77385 HC IMRT SIMPLE: CPT | Performed by: RADIOLOGY

## 2021-11-12 PROCEDURE — 77336 RADIATION PHYSICS CONSULT: CPT | Performed by: RADIOLOGY

## 2021-11-15 PROCEDURE — 77385 HC IMRT SIMPLE: CPT | Performed by: RADIOLOGY

## 2021-11-17 ENCOUNTER — TELEPHONE (OUTPATIENT)
Dept: HEMATOLOGY/ONCOLOGY | Facility: HOSPITAL | Age: 71
End: 2021-11-17

## 2021-11-17 DIAGNOSIS — Z79.899 OTHER LONG TERM (CURRENT) DRUG THERAPY: ICD-10-CM

## 2021-11-17 DIAGNOSIS — Z79.818 ANDROGEN DEPRIVATION THERAPY: Primary | ICD-10-CM

## 2021-11-17 NOTE — TELEPHONE ENCOUNTER
Incoming call from patient to verify receipt and start of Abiraterone and prednisone.   Patient confirms doses as follows:  \"abiraterone 250 mg take 4 capsules on empty stomach\"  Reinforced to take 1 hour prior or 2 hours after a meal.  \"prednisone 5 mg

## 2021-11-24 RX ORDER — PANTOPRAZOLE SODIUM 20 MG/1
TABLET, DELAYED RELEASE ORAL
Qty: 30 TABLET | Refills: 0 | Status: SHIPPED | OUTPATIENT
Start: 2021-11-24 | End: 2021-12-18

## 2021-12-02 DIAGNOSIS — C61 PROSTATE CANCER (HCC): Primary | ICD-10-CM

## 2021-12-02 DIAGNOSIS — Z79.818 ANDROGEN DEPRIVATION THERAPY: ICD-10-CM

## 2021-12-03 ENCOUNTER — OFFICE VISIT (OUTPATIENT)
Dept: HEMATOLOGY/ONCOLOGY | Facility: HOSPITAL | Age: 71
End: 2021-12-03
Attending: INTERNAL MEDICINE
Payer: MEDICARE

## 2021-12-03 VITALS
HEIGHT: 68 IN | HEART RATE: 95 BPM | SYSTOLIC BLOOD PRESSURE: 169 MMHG | DIASTOLIC BLOOD PRESSURE: 89 MMHG | OXYGEN SATURATION: 98 % | TEMPERATURE: 98 F | BODY MASS INDEX: 24.71 KG/M2 | RESPIRATION RATE: 18 BRPM | WEIGHT: 163 LBS

## 2021-12-03 DIAGNOSIS — C19 RECTOSIGMOID CANCER (HCC): ICD-10-CM

## 2021-12-03 DIAGNOSIS — Z79.818 ANDROGEN DEPRIVATION THERAPY: ICD-10-CM

## 2021-12-03 DIAGNOSIS — D50.9 IRON DEFICIENCY ANEMIA, UNSPECIFIED IRON DEFICIENCY ANEMIA TYPE: ICD-10-CM

## 2021-12-03 DIAGNOSIS — C61 PROSTATE CANCER (HCC): Primary | ICD-10-CM

## 2021-12-03 DIAGNOSIS — D50.0 IRON DEFICIENCY ANEMIA DUE TO CHRONIC BLOOD LOSS: ICD-10-CM

## 2021-12-03 DIAGNOSIS — E61.1 IRON DEFICIENCY: ICD-10-CM

## 2021-12-03 DIAGNOSIS — Z79.899 OTHER LONG TERM (CURRENT) DRUG THERAPY: ICD-10-CM

## 2021-12-03 PROCEDURE — 83540 ASSAY OF IRON: CPT

## 2021-12-03 PROCEDURE — 84466 ASSAY OF TRANSFERRIN: CPT

## 2021-12-03 PROCEDURE — 82378 CARCINOEMBRYONIC ANTIGEN: CPT

## 2021-12-03 PROCEDURE — 36415 COLL VENOUS BLD VENIPUNCTURE: CPT

## 2021-12-03 PROCEDURE — 99215 OFFICE O/P EST HI 40 MIN: CPT | Performed by: INTERNAL MEDICINE

## 2021-12-03 PROCEDURE — 80061 LIPID PANEL: CPT

## 2021-12-03 PROCEDURE — 85025 COMPLETE CBC W/AUTO DIFF WBC: CPT

## 2021-12-03 PROCEDURE — 80053 COMPREHEN METABOLIC PANEL: CPT

## 2021-12-03 PROCEDURE — 84403 ASSAY OF TOTAL TESTOSTERONE: CPT

## 2021-12-03 PROCEDURE — 96402 CHEMO HORMON ANTINEOPL SQ/IM: CPT

## 2021-12-03 PROCEDURE — 84153 ASSAY OF PSA TOTAL: CPT

## 2021-12-03 NOTE — PROGRESS NOTES
Cancer Center Progress Note    Patient Name: Riki Clayton   YOB: 1950   Medical Record Number: [id number]   Attending Physician: Candis Alejandra M.D.      Chief Complaint:  State cancer rectosigmoid cancer    History of Present Illness:  Cancer hi History:   Procedure Laterality Date   • UPPER GI ENDOSCOPY,EXAM  05/19/2021       Family History:  Family History   Problem Relation Age of Onset   • Cancer Father         esophageal Ca   • Cancer Sister    • Breast Cancer Sister         +BRCA       Amadou Alaniz Keenan Elise MD, 240 mg at 05/12/21 1168          Allergies:  No Known Allergies     Review of Systems:  All other systems reviewed and negative x12    Vital Signs:  BP (!) 169/89 (BP Location: Left arm, Patient Position: Sitting, Cuff Size: adult)   Pulse 95 colonoscopy 6/9/2021 and was found to have a rectosigmoid mass consistent with adenocarcinoma. There is also invasive carcinoma in a rectosigmoid polyp.  He underwent robotic assisted low anterior resection 7/2/2021 for stage I disease (pT2N0 rectosigmoid)

## 2021-12-03 NOTE — PROGRESS NOTES
Ren Womack is here for  Q3 month labs, Lupron inj and to see Dr Bree Fagan  Reports intermittent, mild hot flashes . Gets up 6-7x/night to urinate since RT completed.   Otherwise doing well, & in good spirits      Lupron administered left upper outer glut IM, tolerat

## 2021-12-13 NOTE — PROGRESS NOTES
[facility]    PATIENT'S NAME: Bruna Cage   RADIATION ONCOLOGIST: Simran Shea.  Ana Hoffman MD   PATIENT ACCOUNT #: [de-identified] LOCATION: [address] [place] [address] RECORD #: [id number] YOB: 1950   DATE: 11/15/2021       RADIATION ONCOLOGY T fractions. Lower doses of 5040 cGy were given to the draining lymph nodes given the patient's high risk disease. TOLERANCE:  The patient tolerated the treatments quite well overall.   He did not experience much in the way of significant difficulties wit

## 2021-12-18 RX ORDER — PANTOPRAZOLE SODIUM 20 MG/1
TABLET, DELAYED RELEASE ORAL
Qty: 30 TABLET | Refills: 0 | Status: SHIPPED | OUTPATIENT
Start: 2021-12-18 | End: 2022-01-20

## 2022-01-03 ENCOUNTER — TELEPHONE (OUTPATIENT)
Dept: HEMATOLOGY/ONCOLOGY | Facility: HOSPITAL | Age: 72
End: 2022-01-03

## 2022-01-03 DIAGNOSIS — Z79.818 ANDROGEN DEPRIVATION THERAPY: ICD-10-CM

## 2022-01-03 DIAGNOSIS — C61 PROSTATE CANCER (HCC): Primary | ICD-10-CM

## 2022-01-03 NOTE — TELEPHONE ENCOUNTER
----- Message from Avel Beard sent at 1/1/2022  5:29 PM CST -----  Regarding: [employer] Rx and COVID booster  Al Hodges, I re-read your note.   If you recommend labs every 6 weeks, and my father requested f/u in 12 weeks, please go ahead and have hi

## 2022-01-14 ENCOUNTER — OFFICE VISIT (OUTPATIENT)
Dept: HEMATOLOGY/ONCOLOGY | Facility: HOSPITAL | Age: 72
End: 2022-01-14
Attending: INTERNAL MEDICINE
Payer: MEDICARE

## 2022-01-14 VITALS
HEIGHT: 68 IN | TEMPERATURE: 98 F | WEIGHT: 165 LBS | SYSTOLIC BLOOD PRESSURE: 173 MMHG | RESPIRATION RATE: 18 BRPM | BODY MASS INDEX: 25.01 KG/M2 | OXYGEN SATURATION: 96 % | DIASTOLIC BLOOD PRESSURE: 95 MMHG | HEART RATE: 102 BPM

## 2022-01-14 DIAGNOSIS — Z79.818 ANDROGEN DEPRIVATION THERAPY: ICD-10-CM

## 2022-01-14 DIAGNOSIS — C19 RECTOSIGMOID CANCER (HCC): ICD-10-CM

## 2022-01-14 DIAGNOSIS — C61 PROSTATE CANCER (HCC): Primary | ICD-10-CM

## 2022-01-14 DIAGNOSIS — D50.0 IRON DEFICIENCY ANEMIA DUE TO CHRONIC BLOOD LOSS: ICD-10-CM

## 2022-01-14 DIAGNOSIS — C61 PROSTATE CANCER (HCC): ICD-10-CM

## 2022-01-14 LAB
ALBUMIN SERPL-MCNC: 4.5 G/DL (ref 3.4–5)
ALBUMIN/GLOB SERPL: 1 {RATIO} (ref 1–2)
ALP LIVER SERPL-CCNC: 73 U/L
ALT SERPL-CCNC: 39 U/L
ANION GAP SERPL CALC-SCNC: 9 MMOL/L (ref 0–18)
AST SERPL-CCNC: 38 U/L (ref 15–37)
BASOPHILS # BLD AUTO: 0.03 X10(3) UL (ref 0–0.2)
BASOPHILS NFR BLD AUTO: 0.5 %
BILIRUB SERPL-MCNC: 1 MG/DL (ref 0.1–2)
BUN BLD-MCNC: 11 MG/DL (ref 7–18)
BUN/CREAT SERPL: 13.9 (ref 10–20)
CALCIUM BLD-MCNC: 10.1 MG/DL (ref 8.5–10.1)
CHLORIDE SERPL-SCNC: 99 MMOL/L (ref 98–112)
CO2 SERPL-SCNC: 29 MMOL/L (ref 21–32)
CREAT BLD-MCNC: 0.79 MG/DL
DEPRECATED RDW RBC AUTO: 41.8 FL (ref 35.1–46.3)
EOSINOPHIL # BLD AUTO: 0.02 X10(3) UL (ref 0–0.7)
EOSINOPHIL NFR BLD AUTO: 0.4 %
ERYTHROCYTE [DISTWIDTH] IN BLOOD BY AUTOMATED COUNT: 12.2 % (ref 11–15)
GLOBULIN PLAS-MCNC: 4.5 G/DL (ref 2.8–4.4)
GLUCOSE BLD-MCNC: 129 MG/DL (ref 70–99)
HCT VFR BLD AUTO: 38.5 %
HGB BLD-MCNC: 13.5 G/DL
IMM GRANULOCYTES # BLD AUTO: 0.02 X10(3) UL (ref 0–1)
IMM GRANULOCYTES NFR BLD: 0.4 %
LYMPHOCYTES # BLD AUTO: 0.72 X10(3) UL (ref 1–4)
LYMPHOCYTES NFR BLD AUTO: 12.9 %
MCH RBC QN AUTO: 32.8 PG (ref 26–34)
MCHC RBC AUTO-ENTMCNC: 35.1 G/DL (ref 31–37)
MCV RBC AUTO: 93.4 FL
MONOCYTES # BLD AUTO: 0.44 X10(3) UL (ref 0.1–1)
MONOCYTES NFR BLD AUTO: 7.9 %
NEUTROPHILS # BLD AUTO: 4.33 X10 (3) UL (ref 1.5–7.7)
NEUTROPHILS # BLD AUTO: 4.33 X10(3) UL (ref 1.5–7.7)
NEUTROPHILS NFR BLD AUTO: 77.9 %
OSMOLALITY SERPL CALC.SUM OF ELEC: 285 MOSM/KG (ref 275–295)
PLATELET # BLD AUTO: 167 10(3)UL (ref 150–450)
POTASSIUM SERPL-SCNC: 3.8 MMOL/L (ref 3.5–5.1)
PROT SERPL-MCNC: 9 G/DL (ref 6.4–8.2)
PSA SERPL-MCNC: <0.01 NG/ML (ref ?–4)
RBC # BLD AUTO: 4.12 X10(6)UL
SODIUM SERPL-SCNC: 137 MMOL/L (ref 136–145)
TESTOST SERPL-MCNC: <7 NG/DL
WBC # BLD AUTO: 5.6 X10(3) UL (ref 4–11)

## 2022-01-14 PROCEDURE — 85025 COMPLETE CBC W/AUTO DIFF WBC: CPT

## 2022-01-14 PROCEDURE — 84153 ASSAY OF PSA TOTAL: CPT

## 2022-01-14 PROCEDURE — 36415 COLL VENOUS BLD VENIPUNCTURE: CPT

## 2022-01-14 PROCEDURE — 99215 OFFICE O/P EST HI 40 MIN: CPT | Performed by: INTERNAL MEDICINE

## 2022-01-14 PROCEDURE — 80053 COMPREHEN METABOLIC PANEL: CPT

## 2022-01-14 PROCEDURE — 84403 ASSAY OF TOTAL TESTOSTERONE: CPT

## 2022-01-14 NOTE — PROGRESS NOTES
Cancer Center Progress Note    Patient Name: Jennifer Gill   YOB: 1950   Medical Record Number: [id number]   Attending Physician: Rickie Regan M.D.      Chief Complaint:  State cancer rectosigmoid cancer    History of Present Illness:  Cancer hi glasses       Past Surgical History:  Past Surgical History:   Procedure Laterality Date   • UPPER GI ENDOSCOPY,EXAM  05/19/2021       Family History:  Family History   Problem Relation Age of Onset   • Cancer Father         esophageal Ca   • Cancer Sister systems reviewed and negative x12    Vital Signs:  BP (!) 173/95 (BP Location: Right arm, Patient Position: Sitting, Cuff Size: adult)   Pulse 102   Temp 97.9 °F (36.6 °C) (Oral)   Resp 18   Ht 1.727 m (5' 8\")   Wt 74.8 kg (165 lb)   SpO2 96%   BMI 25.09 invasive carcinoma in a rectosigmoid polyp. He underwent robotic assisted low anterior resection 7/2/2021 for stage I disease (pT2N0 rectosigmoid)    –PSA has responded to ADT will continue low up with radiation oncology.   He will receive ADT through fall

## 2022-01-18 ENCOUNTER — TELEPHONE (OUTPATIENT)
Dept: HEMATOLOGY/ONCOLOGY | Facility: HOSPITAL | Age: 72
End: 2022-01-18

## 2022-01-18 NOTE — TELEPHONE ENCOUNTER
Application completed and signed by patient and MD and submitted this morning to [place] and [employer] for assistance with erleada.

## 2022-01-20 RX ORDER — PANTOPRAZOLE SODIUM 20 MG/1
TABLET, DELAYED RELEASE ORAL
Qty: 30 TABLET | Refills: 0 | Status: SHIPPED | OUTPATIENT
Start: 2022-01-20 | End: 2022-01-25

## 2022-01-23 DIAGNOSIS — I10 ESSENTIAL HYPERTENSION: ICD-10-CM

## 2022-01-25 ENCOUNTER — OFFICE VISIT (OUTPATIENT)
Dept: FAMILY MEDICINE CLINIC | Facility: CLINIC | Age: 72
End: 2022-01-25
Payer: MEDICARE

## 2022-01-25 VITALS
BODY MASS INDEX: 25.16 KG/M2 | HEIGHT: 68 IN | WEIGHT: 166 LBS | SYSTOLIC BLOOD PRESSURE: 124 MMHG | HEART RATE: 104 BPM | TEMPERATURE: 98 F | DIASTOLIC BLOOD PRESSURE: 86 MMHG | OXYGEN SATURATION: 96 %

## 2022-01-25 DIAGNOSIS — R05.3 CHRONIC COUGH: ICD-10-CM

## 2022-01-25 DIAGNOSIS — E61.1 IRON DEFICIENCY: ICD-10-CM

## 2022-01-25 DIAGNOSIS — C61 PROSTATE CANCER (HCC): ICD-10-CM

## 2022-01-25 DIAGNOSIS — I10 ESSENTIAL HYPERTENSION: ICD-10-CM

## 2022-01-25 DIAGNOSIS — K21.9 GASTROESOPHAGEAL REFLUX DISEASE WITHOUT ESOPHAGITIS: ICD-10-CM

## 2022-01-25 DIAGNOSIS — C19 RECTOSIGMOID CANCER (HCC): ICD-10-CM

## 2022-01-25 DIAGNOSIS — K74.00 LIVER FIBROSIS: ICD-10-CM

## 2022-01-25 DIAGNOSIS — Z13.31 DEPRESSION SCREENING: ICD-10-CM

## 2022-01-25 DIAGNOSIS — N06.9 ISOLATED PROTEINURIA WITH MORPHOLOGIC LESION: ICD-10-CM

## 2022-01-25 DIAGNOSIS — Z00.00 ENCOUNTER FOR ANNUAL HEALTH EXAMINATION: Primary | ICD-10-CM

## 2022-01-25 PROCEDURE — G0444 DEPRESSION SCREEN ANNUAL: HCPCS | Performed by: FAMILY MEDICINE

## 2022-01-25 PROCEDURE — 99212 OFFICE O/P EST SF 10 MIN: CPT | Performed by: FAMILY MEDICINE

## 2022-01-25 PROCEDURE — G0439 PPPS, SUBSEQ VISIT: HCPCS | Performed by: FAMILY MEDICINE

## 2022-01-25 RX ORDER — ABIRATERONE ACETATE 250 MG/1
TABLET ORAL
COMMUNITY
Start: 2021-12-08

## 2022-01-25 RX ORDER — AMLODIPINE AND VALSARTAN 10; 320 MG/1; MG/1
1 TABLET ORAL DAILY
Qty: 90 TABLET | Refills: 1 | Status: SHIPPED | OUTPATIENT
Start: 2022-01-25

## 2022-01-25 RX ORDER — PANTOPRAZOLE SODIUM 20 MG/1
20 TABLET, DELAYED RELEASE ORAL DAILY
Qty: 90 TABLET | Refills: 1 | Status: SHIPPED | OUTPATIENT
Start: 2022-01-25

## 2022-01-25 NOTE — PATIENT INSTRUCTIONS
Lloyd Bui's SCREENING SCHEDULE   Tests on this list are recommended by your physician but may not be covered, or covered at this frequency, by your insurer. Please check with your insurance carrier before scheduling to verify coverage.    PREVENTATIVE Pneumococcal Each vaccine (Tbcqgfm54 & Ngmmqbkst32) covered once after 65 Prevnar 13: -    [id number]: -     Pneumococcal Vaccination(1 of 4 - PCV13) Never done    Hepatitis B One screening covered for patients with certain risk factors   -  No recomme

## 2022-01-25 NOTE — PROGRESS NOTES
Subjective:   Particia Nose is a 80year old male who presents for a Medicare Subsequent Annual Wellness visit (Pt already had Initial Annual Wellness). Having poor sleep due to nocturia    Undergoing ADT for prostate cancer.   S/p surgery for rectosigmoi History:  He  has a past medical history of Anemia, Cirrhosis ([facility] [address] Elevated PSA, less than 10 ng/ml (1/21/2019), Essential hypertension, [facility] ([facility]), and Visual impairment.   Surgical History:  He  has a past surgical history that i sounds: Normal heart sounds. No murmur heard. Pulmonary:      Effort: Pulmonary effort is normal. No respiratory distress. Breath sounds: Normal breath sounds. Abdominal:      General: Bowel sounds are normal. There is no distension.       Debora Spatz message to Dr. Ana Casanova in regards to nocturia to determine best txt give that he is undergoing ADT    8. Isolated proteinuria with morphologic lesion  -continue nephro f/u    9. Chronic cough  10.  Gastroesophageal reflux disease without esophagitis  -     P Tetanus, or Pneumococcal?: No        Lloyd Bui's SCREENING SCHEDULE   Tests on this list are recommended by your physician but may not be covered, or covered at this frequency, by your insurer.    Please check with your insurance carrier before scheduling -  Influenza Vaccine(1) Never done    Pneumococcal Each vaccine (Pbwvoct62 & Ylafdzfec72) covered once after 65 Prevnar 13: -    [id number]: -     Pneumococcal Vaccination(1 of 4 - PCV13) Never done    Hepatitis B One screening covered for patients with c

## 2022-01-26 RX ORDER — AMLODIPINE AND VALSARTAN 10; 320 MG/1; MG/1
TABLET ORAL
Qty: 90 TABLET | Refills: 0 | OUTPATIENT
Start: 2022-01-26

## 2022-01-28 PROBLEM — K21.9 GASTROESOPHAGEAL REFLUX DISEASE WITHOUT ESOPHAGITIS: Status: ACTIVE | Noted: 2022-01-28

## 2022-01-28 PROBLEM — R97.20 ELEVATED PSA, LESS THAN 10 NG/ML: Status: RESOLVED | Noted: 2019-01-21 | Resolved: 2022-01-28

## 2022-02-10 ENCOUNTER — TELEPHONE (OUTPATIENT)
Dept: HEMATOLOGY/ONCOLOGY | Facility: HOSPITAL | Age: 72
End: 2022-02-10

## 2022-02-10 NOTE — TELEPHONE ENCOUNTER
Received call from patient yesterday inquiring as to the status of his application that was submitted January 25. Cathie Kraus contacted this morning and they confirmed they have received the application and all documents are complete but due to the fact that it is early in the year they are backlogged with hundreds of applications. We requested that this application be addressed as soon as possible and they confirmed they would do so, patient is without the medicine. Call placed to patient and message left on mobile device, not at work number today. Also updated emergency contact , daughter Jyoti Hui who will update her father and was appreciative of call. Hopefully we should hear soon.

## 2022-02-15 ENCOUNTER — TELEPHONE (OUTPATIENT)
Dept: GENETICS | Facility: HOSPITAL | Age: 72
End: 2022-02-15

## 2022-02-15 NOTE — TELEPHONE ENCOUNTER
Patient need to reschedule his appointment for 2/18/22 with Dr. Rocio Sharma, please ask for the patient at the work number by name.

## 2022-02-16 ENCOUNTER — TELEPHONE (OUTPATIENT)
Dept: HEMATOLOGY/ONCOLOGY | Facility: HOSPITAL | Age: 72
End: 2022-02-16

## 2022-02-16 NOTE — TELEPHONE ENCOUNTER
Informed patient that I spoke to [employer] and as of today they sent the application on to their upper management team and they should have a decision on weather or not the application has been approved in 24-48 hours. Patient thanked me for following up and stated that he will call back Friday to see where we are at in the process then.

## 2022-02-18 ENCOUNTER — APPOINTMENT (OUTPATIENT)
Dept: RADIATION ONCOLOGY | Facility: HOSPITAL | Age: 72
End: 2022-02-18
Attending: RADIOLOGY
Payer: MEDICARE

## 2022-02-21 ENCOUNTER — TELEPHONE (OUTPATIENT)
Dept: HEMATOLOGY/ONCOLOGY | Facility: HOSPITAL | Age: 72
End: 2022-02-21

## 2022-02-22 NOTE — TELEPHONE ENCOUNTER
Pt called looking for an update on the [facility] assistance application. Pt made aware that the [facility] has not yet heard back from [employer] at this time. A follow up call will be made tomorrow to check status. Pt verbalized understanding.

## 2022-02-23 NOTE — TELEPHONE ENCOUNTER
Johny Silk application refaxed- let patient know it will be another 24-48 hours.   He stated understanding

## 2022-03-04 ENCOUNTER — OFFICE VISIT (OUTPATIENT)
Dept: RADIATION ONCOLOGY | Facility: HOSPITAL | Age: 72
End: 2022-03-04
Attending: RADIOLOGY
Payer: MEDICARE

## 2022-03-04 ENCOUNTER — OFFICE VISIT (OUTPATIENT)
Dept: HEMATOLOGY/ONCOLOGY | Facility: HOSPITAL | Age: 72
End: 2022-03-04
Attending: INTERNAL MEDICINE
Payer: MEDICARE

## 2022-03-04 VITALS
RESPIRATION RATE: 18 BRPM | OXYGEN SATURATION: 95 % | WEIGHT: 168 LBS | DIASTOLIC BLOOD PRESSURE: 93 MMHG | TEMPERATURE: 98 F | SYSTOLIC BLOOD PRESSURE: 196 MMHG | HEART RATE: 119 BPM | BODY MASS INDEX: 26 KG/M2

## 2022-03-04 VITALS
BODY MASS INDEX: 25.46 KG/M2 | TEMPERATURE: 98 F | SYSTOLIC BLOOD PRESSURE: 180 MMHG | OXYGEN SATURATION: 95 % | WEIGHT: 168 LBS | DIASTOLIC BLOOD PRESSURE: 90 MMHG | RESPIRATION RATE: 18 BRPM | HEART RATE: 116 BPM | HEIGHT: 68 IN

## 2022-03-04 DIAGNOSIS — C19 RECTOSIGMOID CANCER (HCC): ICD-10-CM

## 2022-03-04 DIAGNOSIS — C61 PROSTATE CANCER (HCC): Primary | ICD-10-CM

## 2022-03-04 DIAGNOSIS — D50.0 IRON DEFICIENCY ANEMIA DUE TO CHRONIC BLOOD LOSS: ICD-10-CM

## 2022-03-04 DIAGNOSIS — E61.1 IRON DEFICIENCY: ICD-10-CM

## 2022-03-04 DIAGNOSIS — Z79.818 ANDROGEN DEPRIVATION THERAPY: ICD-10-CM

## 2022-03-04 DIAGNOSIS — D50.9 IRON DEFICIENCY ANEMIA, UNSPECIFIED IRON DEFICIENCY ANEMIA TYPE: ICD-10-CM

## 2022-03-04 LAB
ALBUMIN SERPL-MCNC: 4.5 G/DL (ref 3.4–5)
ALBUMIN/GLOB SERPL: 1 {RATIO} (ref 1–2)
ALP LIVER SERPL-CCNC: 67 U/L
ALT SERPL-CCNC: 112 U/L
ANION GAP SERPL CALC-SCNC: 7 MMOL/L (ref 0–18)
AST SERPL-CCNC: 89 U/L (ref 15–37)
BASOPHILS # BLD AUTO: 0.03 X10(3) UL (ref 0–0.2)
BASOPHILS NFR BLD AUTO: 0.9 %
BILIRUB SERPL-MCNC: 0.4 MG/DL (ref 0.1–2)
BUN BLD-MCNC: 9 MG/DL (ref 7–18)
BUN/CREAT SERPL: 12 (ref 10–20)
CALCIUM BLD-MCNC: 9.3 MG/DL (ref 8.5–10.1)
CEA SERPL-MCNC: 1.6 NG/ML (ref ?–5)
CHLORIDE SERPL-SCNC: 104 MMOL/L (ref 98–112)
CO2 SERPL-SCNC: 24 MMOL/L (ref 21–32)
CREAT BLD-MCNC: 0.75 MG/DL
DEPRECATED RDW RBC AUTO: 43.3 FL (ref 35.1–46.3)
EOSINOPHIL # BLD AUTO: 0.08 X10(3) UL (ref 0–0.7)
EOSINOPHIL NFR BLD AUTO: 2.3 %
ERYTHROCYTE [DISTWIDTH] IN BLOOD BY AUTOMATED COUNT: 12.5 % (ref 11–15)
GLOBULIN PLAS-MCNC: 4.5 G/DL (ref 2.8–4.4)
GLUCOSE BLD-MCNC: 122 MG/DL (ref 70–99)
HCT VFR BLD AUTO: 37.2 %
HGB BLD-MCNC: 12.9 G/DL
IMM GRANULOCYTES # BLD AUTO: 0.01 X10(3) UL (ref 0–1)
IMM GRANULOCYTES NFR BLD: 0.3 %
LYMPHOCYTES # BLD AUTO: 0.62 X10(3) UL (ref 1–4)
LYMPHOCYTES NFR BLD AUTO: 18.1 %
MCH RBC QN AUTO: 32.8 PG (ref 26–34)
MCHC RBC AUTO-ENTMCNC: 34.7 G/DL (ref 31–37)
MCV RBC AUTO: 94.7 FL
MONOCYTES # BLD AUTO: 0.52 X10(3) UL (ref 0.1–1)
MONOCYTES NFR BLD AUTO: 15.2 %
NEUTROPHILS # BLD AUTO: 2.17 X10 (3) UL (ref 1.5–7.7)
NEUTROPHILS # BLD AUTO: 2.17 X10(3) UL (ref 1.5–7.7)
NEUTROPHILS NFR BLD AUTO: 63.2 %
OSMOLALITY SERPL CALC.SUM OF ELEC: 280 MOSM/KG (ref 275–295)
PLATELET # BLD AUTO: 135 10(3)UL (ref 150–450)
POTASSIUM SERPL-SCNC: 3.9 MMOL/L (ref 3.5–5.1)
PROT SERPL-MCNC: 9 G/DL (ref 6.4–8.2)
PSA SERPL-MCNC: <0.01 NG/ML (ref ?–4)
RBC # BLD AUTO: 3.93 X10(6)UL
SODIUM SERPL-SCNC: 135 MMOL/L (ref 136–145)
TESTOST SERPL-MCNC: <7 NG/DL
WBC # BLD AUTO: 3.4 X10(3) UL (ref 4–11)

## 2022-03-04 PROCEDURE — 99211 OFF/OP EST MAY X REQ PHY/QHP: CPT

## 2022-03-04 PROCEDURE — 85025 COMPLETE CBC W/AUTO DIFF WBC: CPT

## 2022-03-04 PROCEDURE — 36415 COLL VENOUS BLD VENIPUNCTURE: CPT

## 2022-03-04 PROCEDURE — 99215 OFFICE O/P EST HI 40 MIN: CPT | Performed by: INTERNAL MEDICINE

## 2022-03-04 PROCEDURE — 80053 COMPREHEN METABOLIC PANEL: CPT

## 2022-03-04 PROCEDURE — 84403 ASSAY OF TOTAL TESTOSTERONE: CPT

## 2022-03-04 PROCEDURE — 82378 CARCINOEMBRYONIC ANTIGEN: CPT

## 2022-03-04 PROCEDURE — 96402 CHEMO HORMON ANTINEOPL SQ/IM: CPT

## 2022-03-04 PROCEDURE — 84153 ASSAY OF PSA TOTAL: CPT

## 2022-03-04 RX ORDER — TAMSULOSIN HYDROCHLORIDE 0.4 MG/1
0.4 CAPSULE ORAL EVERY EVENING
Qty: 30 CAPSULE | Refills: 3 | Status: SHIPPED | OUTPATIENT
Start: 2022-03-04 | End: 2022-07-02

## 2022-03-04 NOTE — PROGRESS NOTES
Patient here for Lab/Lupron/ MD    Labs drawn as ordered then sent. Lupron given IM in Left upper outer gluteal muscle. Tolerated injection well. Site covered with a band-aide. Discharged back to [facility]. Will see Dr Clifton Greenfield today.

## 2022-03-04 NOTE — PROGRESS NOTES
Nursing Follow-Up Note    Patient: Marisol Olivas  YOB: 1950  Age: 80year old  Radiation Oncologist: Dr. Melly Rdz  Referring Physician: No ref. provider found  Chief Complaint: Patient presents with:  Prostate Cancer    Date: 3/4/2022    Toxicities: Fatigue Grade 0= None  Constipation Grade 0= None irregular BM's  Diarrhea  Grade 0= None  Dysuria on urination Grade 0= None  Urgency on urination Grade 0= None  Frequency on urinationGrade 0= None  Urine stream strength Moderate  Urine Incontinence Grade 0= None  Nocturia Grade 1= Present ~5x/noc      Vital Signs: There were no vitals taken for this visit., Wt Readings from Last 6 Encounters:  01/25/22 : 75.3 kg (166 lb)  01/14/22 : 74.8 kg (165 lb)  12/03/21 : 73.9 kg (163 lb)  11/09/21 : 74 kg (163 lb 3.2 oz)  11/02/21 : 73.9 kg (163 lb)  10/26/21 : 73.9 kg (163 lb)      Allergies:  No Known Allergies    Nursing Note: Pt seen for follow up with Dr. Khari Pressley. Pt completed RT on 11/15/2021. Pt seen today for Lupron injection and PSA level. PSA drawn today-not yet resulted. Previously on 1/14/2022 PSA <0.01. BP high today for visit, denies blurred vision/headache. Pt denies pain today at visit. Pt does report seeing blood in stool occasionally over the past 3 weeks. Occurs about 1x/week. Bright red blood-Dr. Khari Pressley made aware. Pt to follow up with Dr. Willow Latham regarding bleeding. Pt rates AUA a 6 today, ED 2. Pt scheduled to see Dr. Filemon Ojeda today. Pt to follow up with Dr. Khari Pressley in 6 months.

## 2022-03-04 NOTE — PATIENT INSTRUCTIONS
Follow up with Dr. Estelle Wray in 6 months. Virginia Malhotra will call you to schedule your follow up appointment. Please call [phone] with any radiation questions. Dr. Estelle Wray has written a Flomax prescription. Follow up with Dr. Victorino Martinez regarding your occasional bleeding with bowel movements.

## 2022-03-07 NOTE — PROGRESS NOTES
[facility]    PATIENT'S NAME: Ferdinand Verma   RADIATION ONCOLOGIST: Domonique Yun. Ximena White MD   PATIENT ACCOUNT #: [de-identified] LOCATION: [facility] RECORD #: [id number] YOB: 1950   FOLLOW-UP DATE: 03/04/2022       RADIATION ONCOLOGY FOLLOW-UP NOTE    REFERRING PHYSICIAN:  Isai Pickett MD.    DIAGNOSIS:    1. Adenocarcinoma of prostate, grade group 5, PSA 10.4, high risk. 2.   Adenocarcinoma of the lower sigmoid/upper rectum, pathologic T2N0.    REGION TREATED:  Prostate, 7000 cGy, completed 11/15/2021. INTERVAL SINCE COMPLETION OF RADIATION THERAPY:  Four months. PATIENT STATUS:  Clinically and biochemically KEO. HISTORY:  The patient is a 26-year-old male who presents today for a routine followup visit. He has a history of a high-risk prostate cancer diagnosed back in May 2021. At that time, a PSA was noted to be 10.4 and a biopsy showed grade group 5 disease in the left apex. Nine of 13 cores were involved, but there were some cores with lower grade disease. There was no evidence of any metastatic disease, but the patient was found to have significant anemia and a workup for a GI bleed was undertaken. This ultimately found a rectosigmoid mass consistent with an adenocarcinoma, and he then underwent a robotic-assisted low anterior resection on 07/02/2021. Pathology revealed a T2N0 tumor. He was then placed on ADT prior to his colorectal surgery to keep the prostate cancer in check, and he then underwent surgery. Based upon the pathology, it was felt that he did not need any adjuvant therapy for the colorectal cancer. After recovery, he then presented for definitive treatment for his prostate cancer. I treated him to 7000 cGy in 250 cGy fractions in conjunction with androgen deprivation therapy, and he completed these treatments on 11/15/2021. The patient presents today for his first followup visit since completing his radiation.   Overall, he feels rather well.  He denies much in the way of significant issues overall. He has an AUA score today of 6/35 which is slightly up from his pretreatment level of 3/35. He does have some increased nocturia, however, and states that he is up 3 to 5 times per night routinely. His erectile functioning is poor, as would be expected on androgen deprivation. This is now down to 2/25 when it was 21/25 pretreatment. He does note that he has had some sporadic episodes of bleeding in his bowels over the last 3 weeks. This is happening about once per week or thereabouts but does have him somewhat concerned. He denies any other issues or complaints, and denies any weight loss, changes in appetite, any bony or joint pain, or other significant problems. His most recent PSA was on January 14, 2022 and was undetectable. He did have a blood draw today, but this result was not available at the time of dictation. PHYSICAL EXAMINATION:    VITAL SIGNS:  On exam today, the patient is noted to have a blood pressure of 196/93, pulse of 119, respiratory rate of 18, and temperature 97.9. His weight is stable at 168 pounds, and he has a pain score of 0. NECK:  Supple with no lymphadenopathy. LUNGS:  Clear to auscultation bilaterally. HEART:  Regular rate and rhythm. Normal S1, S2, with no audible murmurs. LYMPHATICS:  There is no supraclavicular, axillary, or inguinal lymphadenopathy. ABDOMEN:  Benign. IMPRESSION AND RECOMMENDATIONS:  Overall, the patient is doing reasonably well. I will give him a prescription for Flomax to help with his nocturia. I am optimistic that this will improve his nighttime urination and I did talk to him about the possibility of orthostatic hypotension as a result of this medication. Also, the patient is having significant hot flashes from his androgen deprivation, and he will be seeing Dr. Ryan Alvarez later this afternoon and can discuss techniques with him to help combat this.   Lastly, and perhaps most importantly, is the issue of the rectal bleeding. This is likely benign due to the fact that the patient has had surgery plus high-dose radiation to that location, but given his history of a prior colorectal cancer, this certainly cannot be ruled out. According to Dr. Geno Caldwell note, the patient is already scheduled for a colonoscopy to be done in June. I told him to reach out to Dr. Barrera Fields and to see whether or not he would wish to move this colonoscopy appointment up if the bleeding persists. Otherwise, the patient is doing rather well and I remain cautiously optimistic. He did have high-risk disease, so he remains at high risk for failure. Fortunately, his disease has been controlled via radiation and ADT so far, though again today's PSA is still pending. I would like to see the patient again back for followup in 6 months, but told him that if he should have any problems or questions prior to that time, he should feel free to contact my office, I would be happy to see him sooner. Thank you very much for allowing me the opportunity to participate in the care of this patient. If there should be any questions regarding the radiotherapy, please feel free to contact me at any time. Dictated By Jonathan Rousseau MD  d: 03/04/2022 14:10:22  t: 03/05/2022 05:47:35  [facility] [id number]  [facility]/    cc: Roosevelt Hicks MD   [facility] MD Rachel Tidwell MD Dr. Albesa Must

## 2022-03-15 ENCOUNTER — TELEPHONE (OUTPATIENT)
Dept: GASTROENTEROLOGY | Facility: CLINIC | Age: 72
End: 2022-03-15

## 2022-03-15 NOTE — TELEPHONE ENCOUNTER
Patient outreach message received:    Health maintenance updated. Last colonoscopy done 6/9/21. 1 year recall placed into Pt Outreach, next due on June 2022 per Dr. Caty Liz. Recall reminder letter mailed out to patient.

## 2022-03-23 ENCOUNTER — TELEPHONE (OUTPATIENT)
Dept: GASTROENTEROLOGY | Facility: CLINIC | Age: 72
End: 2022-03-23

## 2022-03-23 NOTE — TELEPHONE ENCOUNTER
Patient received recall letter for colonoscopy, patient requesting to be seen in office for blood in stool. Indicates this happens at least 1 time per week. Please call at [phone],thanks.

## 2022-03-23 NOTE — TELEPHONE ENCOUNTER
Contacted patient and reviewed note below. Patient agreeable to plan. GI Schedulers--    Please see Dr. Shelby Romero message below and contact patient to schedule colonoscopy. Thank you. Please schedule him for colonoscopy with MAC with haylie garza for history of colon cancer and rectal bleeding.     Can offer to add 4/13 at the [facility] (I can start early at 7 or 7:30 if the [facility] requests this)

## 2022-03-23 NOTE — TELEPHONE ENCOUNTER
Dr. Debbie Laboy,    I spoke to patient who states for the past 3 weeks he has been having bright red blood in his stool every week at least once. Patient denies constipation, diarrhea, weakness/fatigue, sob, dizziness/lightheadedness, abdominal pain. Patient states he did have 28 days of radiation last year and colon surgery. He states his bowel movements were normal prior to this as well, now his pattern has changed. Used to go daily one/twice a day, now may not go every day. I advised to avoid straining, prolonged sitting on toilet topical creams. States this started after his recent blood test. Patient wants to know if a medication can be taken for this, or if needs another intervention. He is due for cln in June which he received a recall letter for from our office to schedule. Please advise, thank you.

## 2022-03-23 NOTE — TELEPHONE ENCOUNTER
Could be hemorrhoidal or possibly related to prior radiation. I would recommend increasing fiber in the diet and likely starting a fiber supplement like citrucel of fiber con. I would also recommend planning the colonoscopy for a bit sooner then plan for June. Please schedule him for colonoscopy with MAC with haylie garza for history of colon cancer and rectal bleeding. Can offer to add 4/13 at the [facility] (I can start early at 7 or 7:30 if the [facility] requests this)    Please ask him to monitor for continued/heavy bleeding or any of the signs/symptoms that were noted as not present currently that should prompt ED evaluation.     Thanks    Vincent Leija MD  [facility], [facility] - Gastroenterology  3/23/2022  3:29 PM

## 2022-04-02 NOTE — TELEPHONE ENCOUNTER
I will contact [facility] on Monday 4/4/22 to see if Dr. Cindy Villela can start this procedure on 4/13/22 at [place] [address].

## 2022-04-04 NOTE — TELEPHONE ENCOUNTER
Dr. Nova Grande--    This patient is scheduled    Please send his Golytely prep to the pharmacy on file

## 2022-04-04 NOTE — TELEPHONE ENCOUNTER
Scheduled for:  Colonoscopy [id number]  Provider Name:  Dr. Nova Grande  Date:  4/13/22  Location:    [facility]  Sedation:  MAC  Time:  0715 (pt is aware that [facility] will call the day before to confirm arrival time)   Prep:  Golytely, sent via [facility] [address] on 4/4/22  Meds/Allergies Reconciled?:  Physician reviewed   Diagnosis with codes:  History of colon cancer Z85.038, Rectal bleeding K62.55  Was patient informed to call insurance with codes (Y/N):  Yes, I confirmed BCBS PPO/Medicare insurance with the patient. Referral sent?:  Referral was sent at the time of electronic surgical scheduling. [facility] or [facility] notified?:  Electronic case request was sent to [facility] via [employer]. Medication Orders:   This patient verbally confirmed that he is not taking:   Iron, blood thinners and is not diabetic   Not latex allergy, Not PCN allergy and does not have a pacemaker   This patient is aware to HOLD his BP meds (Amlodipine-Valsartan--AM) the day of the procedure         Misc Orders:       Further instructions given by staff:

## 2022-04-04 NOTE — TELEPHONE ENCOUNTER
Prep sent    Thanks    Karen Celis MD  [facility], [facility] - Gastroenterology  4/4/2022  4:56 PM

## 2022-04-11 ENCOUNTER — TELEPHONE (OUTPATIENT)
Dept: GASTROENTEROLOGY | Facility: CLINIC | Age: 72
End: 2022-04-11

## 2022-04-11 NOTE — TELEPHONE ENCOUNTER
I spoke to the pt    He has questions regarding his 4 L bowel prep    All of his questions were answered     We went over his bowel prep instructions over the phone And he voices understanding

## 2022-04-13 ENCOUNTER — SURGERY CENTER DOCUMENTATION (OUTPATIENT)
Dept: SURGERY | Age: 72
End: 2022-04-13

## 2022-04-13 ENCOUNTER — LAB REQUISITION (OUTPATIENT)
Dept: SURGERY | Age: 72
End: 2022-04-13
Payer: MEDICARE

## 2022-04-13 PROCEDURE — 45380 COLONOSCOPY AND BIOPSY: CPT | Performed by: INTERNAL MEDICINE

## 2022-04-13 PROCEDURE — 88305 TISSUE EXAM BY PATHOLOGIST: CPT | Performed by: INTERNAL MEDICINE

## 2022-04-13 PROCEDURE — 45385 COLONOSCOPY W/LESION REMOVAL: CPT | Performed by: INTERNAL MEDICINE

## 2022-04-14 ENCOUNTER — TELEPHONE (OUTPATIENT)
Dept: GASTROENTEROLOGY | Facility: CLINIC | Age: 72
End: 2022-04-14

## 2022-04-14 NOTE — TELEPHONE ENCOUNTER
Call was not made from our office, likely from endoscopy department. Patient notified via [address] [place] [address] [address].

## 2022-04-14 NOTE — TELEPHONE ENCOUNTER
Patient is calling back received call to see how he is doing he is calling back and states he is still seeing some blood in stool. Otherwise he is feeling ok. Please call tomorrow as a follow up at the home number listed.

## 2022-04-15 ENCOUNTER — TELEPHONE (OUTPATIENT)
Dept: GASTROENTEROLOGY | Facility: CLINIC | Age: 72
End: 2022-04-15

## 2022-04-15 NOTE — TELEPHONE ENCOUNTER
GI staff: please place recall for colonoscopy in 3 years     Then close encounter    Thanks    Fred Barahona MD  [address] [place] - Gastroenterology  4/15/2022  3:07 PM

## 2022-04-15 NOTE — TELEPHONE ENCOUNTER
Health maintenance updated. Last colonoscopy done 4/13/22. 3 year recall placed into Pt Outreach, next due on 4/13/25 per Dr. Debbie Laboy.

## 2022-04-19 ENCOUNTER — NURSE ONLY (OUTPATIENT)
Dept: HEMATOLOGY/ONCOLOGY | Facility: HOSPITAL | Age: 72
End: 2022-04-19
Attending: RADIOLOGY
Payer: MEDICARE

## 2022-04-19 VITALS
BODY MASS INDEX: 24.71 KG/M2 | TEMPERATURE: 98 F | WEIGHT: 163 LBS | HEART RATE: 101 BPM | RESPIRATION RATE: 18 BRPM | DIASTOLIC BLOOD PRESSURE: 88 MMHG | OXYGEN SATURATION: 97 % | HEIGHT: 68 IN | SYSTOLIC BLOOD PRESSURE: 165 MMHG

## 2022-04-19 DIAGNOSIS — C19 RECTOSIGMOID CANCER (HCC): ICD-10-CM

## 2022-04-19 DIAGNOSIS — C61 PROSTATE CANCER (HCC): ICD-10-CM

## 2022-04-19 DIAGNOSIS — D50.0 IRON DEFICIENCY ANEMIA DUE TO CHRONIC BLOOD LOSS: Primary | ICD-10-CM

## 2022-04-19 DIAGNOSIS — D50.0 IRON DEFICIENCY ANEMIA DUE TO CHRONIC BLOOD LOSS: ICD-10-CM

## 2022-04-19 LAB
BASOPHILS # BLD AUTO: 0.02 X10(3) UL (ref 0–0.2)
BASOPHILS NFR BLD AUTO: 0.4 %
DEPRECATED HBV CORE AB SER IA-ACNC: 133.7 NG/ML
DEPRECATED RDW RBC AUTO: 45.4 FL (ref 35.1–46.3)
EOSINOPHIL # BLD AUTO: 0.01 X10(3) UL (ref 0–0.7)
EOSINOPHIL NFR BLD AUTO: 0.2 %
ERYTHROCYTE [DISTWIDTH] IN BLOOD BY AUTOMATED COUNT: 13 % (ref 11–15)
HCT VFR BLD AUTO: 33.9 %
HGB BLD-MCNC: 11.8 G/DL
IMM GRANULOCYTES # BLD AUTO: 0.02 X10(3) UL (ref 0–1)
IMM GRANULOCYTES NFR BLD: 0.4 %
IRON SATN MFR SERPL: 16 %
IRON SERPL-MCNC: 68 UG/DL
LYMPHOCYTES # BLD AUTO: 0.6 X10(3) UL (ref 1–4)
LYMPHOCYTES NFR BLD AUTO: 13 %
MCH RBC QN AUTO: 33 PG (ref 26–34)
MCHC RBC AUTO-ENTMCNC: 34.8 G/DL (ref 31–37)
MCV RBC AUTO: 94.7 FL
MONOCYTES # BLD AUTO: 0.53 X10(3) UL (ref 0.1–1)
MONOCYTES NFR BLD AUTO: 11.4 %
NEUTROPHILS # BLD AUTO: 3.45 X10 (3) UL (ref 1.5–7.7)
NEUTROPHILS # BLD AUTO: 3.45 X10(3) UL (ref 1.5–7.7)
NEUTROPHILS NFR BLD AUTO: 74.6 %
PLATELET # BLD AUTO: 170 10(3)UL (ref 150–450)
RBC # BLD AUTO: 3.58 X10(6)UL
TIBC SERPL-MCNC: 416 UG/DL (ref 240–450)
TRANSFERRIN SERPL-MCNC: 279 MG/DL (ref 200–360)
WBC # BLD AUTO: 4.6 X10(3) UL (ref 4–11)

## 2022-04-19 PROCEDURE — 84466 ASSAY OF TRANSFERRIN: CPT

## 2022-04-19 PROCEDURE — 85025 COMPLETE CBC W/AUTO DIFF WBC: CPT

## 2022-04-19 PROCEDURE — 36415 COLL VENOUS BLD VENIPUNCTURE: CPT

## 2022-04-19 PROCEDURE — 82728 ASSAY OF FERRITIN: CPT

## 2022-04-19 PROCEDURE — 83540 ASSAY OF IRON: CPT

## 2022-04-19 PROCEDURE — 99215 OFFICE O/P EST HI 40 MIN: CPT | Performed by: INTERNAL MEDICINE

## 2022-04-20 ENCOUNTER — TELEPHONE (OUTPATIENT)
Dept: HEMATOLOGY/ONCOLOGY | Facility: HOSPITAL | Age: 72
End: 2022-04-20

## 2022-04-20 NOTE — TELEPHONE ENCOUNTER
Iron sat 16%, per Dr Chandana Akbar recommendations, Mr Katey Morgan contacted and advised of the iron infusion Dr Chandana Akbar has ordered. He has had this in the past, is familiar with the infusion. Advised pt it has been authorized and  will contact him to schedule. Appreciative of the call.

## 2022-04-20 NOTE — TELEPHONE ENCOUNTER
Per requested by patient called work number to schedule an iron infusion appointment, unable to leave voicemail to callback to schedule iron infusion.  Call #1 4/20/22 Amisha [address]

## 2022-04-26 ENCOUNTER — TELEPHONE (OUTPATIENT)
Dept: HEMATOLOGY/ONCOLOGY | Facility: HOSPITAL | Age: 72
End: 2022-04-26

## 2022-04-26 NOTE — TELEPHONE ENCOUNTER
Called Work number, someone answer and said he is not in the office. I also attempted to reach mobile number.  No answer left a voicemail to call back

## 2022-05-05 ENCOUNTER — OFFICE VISIT (OUTPATIENT)
Dept: HEMATOLOGY/ONCOLOGY | Facility: HOSPITAL | Age: 72
End: 2022-05-05
Attending: INTERNAL MEDICINE
Payer: MEDICARE

## 2022-05-05 VITALS
HEART RATE: 105 BPM | SYSTOLIC BLOOD PRESSURE: 145 MMHG | RESPIRATION RATE: 16 BRPM | TEMPERATURE: 98 F | DIASTOLIC BLOOD PRESSURE: 76 MMHG | OXYGEN SATURATION: 96 %

## 2022-05-05 DIAGNOSIS — C61 PROSTATE CANCER (HCC): ICD-10-CM

## 2022-05-05 DIAGNOSIS — E61.1 IRON DEFICIENCY: ICD-10-CM

## 2022-05-05 DIAGNOSIS — D50.0 IRON DEFICIENCY ANEMIA DUE TO CHRONIC BLOOD LOSS: Primary | ICD-10-CM

## 2022-05-05 PROCEDURE — 96365 THER/PROPH/DIAG IV INF INIT: CPT

## 2022-05-05 PROCEDURE — 96366 THER/PROPH/DIAG IV INF ADDON: CPT

## 2022-05-05 NOTE — PROGRESS NOTES
Pt arrived for Venofer 400 mg. He's had Venofer 200 mg in the past.     Given over 150 min. Observed for 30 min. Tolerated well. No s/s of reaction noted. Discharged home ambulatory, he is aware of his F/U with Dr. Berhane Stroud on 6/6.

## 2022-06-03 DIAGNOSIS — D50.0 IRON DEFICIENCY ANEMIA DUE TO CHRONIC BLOOD LOSS: Primary | ICD-10-CM

## 2022-06-06 ENCOUNTER — APPOINTMENT (OUTPATIENT)
Dept: HEMATOLOGY/ONCOLOGY | Facility: HOSPITAL | Age: 72
End: 2022-06-06
Attending: INTERNAL MEDICINE
Payer: MEDICARE

## 2022-06-06 VITALS
SYSTOLIC BLOOD PRESSURE: 164 MMHG | TEMPERATURE: 98 F | HEIGHT: 68 IN | BODY MASS INDEX: 25.31 KG/M2 | RESPIRATION RATE: 16 BRPM | WEIGHT: 167 LBS | OXYGEN SATURATION: 97 % | HEART RATE: 104 BPM | DIASTOLIC BLOOD PRESSURE: 76 MMHG

## 2022-06-06 DIAGNOSIS — E61.1 IRON DEFICIENCY: ICD-10-CM

## 2022-06-06 DIAGNOSIS — Z51.11 CHEMOTHERAPY MANAGEMENT, ENCOUNTER FOR: ICD-10-CM

## 2022-06-06 DIAGNOSIS — D50.0 IRON DEFICIENCY ANEMIA DUE TO CHRONIC BLOOD LOSS: ICD-10-CM

## 2022-06-06 DIAGNOSIS — C61 PROSTATE CANCER (HCC): Primary | ICD-10-CM

## 2022-06-06 DIAGNOSIS — Z79.899 OTHER LONG TERM (CURRENT) DRUG THERAPY: ICD-10-CM

## 2022-06-06 DIAGNOSIS — C19 RECTOSIGMOID CANCER (HCC): ICD-10-CM

## 2022-06-06 LAB
ALBUMIN SERPL-MCNC: 4.2 G/DL (ref 3.4–5)
ALBUMIN/GLOB SERPL: 1 {RATIO} (ref 1–2)
ALP LIVER SERPL-CCNC: 51 U/L
ALT SERPL-CCNC: 41 U/L
ANION GAP SERPL CALC-SCNC: 11 MMOL/L (ref 0–18)
AST SERPL-CCNC: 50 U/L (ref 15–37)
BASOPHILS # BLD AUTO: 0.04 X10(3) UL (ref 0–0.2)
BASOPHILS NFR BLD AUTO: 0.6 %
BILIRUB SERPL-MCNC: 0.6 MG/DL (ref 0.1–2)
BUN BLD-MCNC: 9 MG/DL (ref 7–18)
BUN/CREAT SERPL: 12.3 (ref 10–20)
CALCIUM BLD-MCNC: 9.2 MG/DL (ref 8.5–10.1)
CEA SERPL-MCNC: 2.2 NG/ML (ref ?–5)
CHLORIDE SERPL-SCNC: 102 MMOL/L (ref 98–112)
CO2 SERPL-SCNC: 24 MMOL/L (ref 21–32)
CREAT BLD-MCNC: 0.73 MG/DL
DEPRECATED HBV CORE AB SER IA-ACNC: 153.1 NG/ML
DEPRECATED RDW RBC AUTO: 45.4 FL (ref 35.1–46.3)
EOSINOPHIL # BLD AUTO: 0.02 X10(3) UL (ref 0–0.7)
EOSINOPHIL NFR BLD AUTO: 0.3 %
ERYTHROCYTE [DISTWIDTH] IN BLOOD BY AUTOMATED COUNT: 13.2 % (ref 11–15)
GLOBULIN PLAS-MCNC: 4.2 G/DL (ref 2.8–4.4)
GLUCOSE BLD-MCNC: 120 MG/DL (ref 70–99)
HCT VFR BLD AUTO: 32.9 %
HGB BLD-MCNC: 11.4 G/DL
IMM GRANULOCYTES # BLD AUTO: 0.06 X10(3) UL (ref 0–1)
IMM GRANULOCYTES NFR BLD: 0.9 %
IRON SATN MFR SERPL: 25 %
IRON SERPL-MCNC: 102 UG/DL
LYMPHOCYTES # BLD AUTO: 0.77 X10(3) UL (ref 1–4)
LYMPHOCYTES NFR BLD AUTO: 11.6 %
MCH RBC QN AUTO: 32.9 PG (ref 26–34)
MCHC RBC AUTO-ENTMCNC: 34.7 G/DL (ref 31–37)
MCV RBC AUTO: 95.1 FL
MONOCYTES # BLD AUTO: 0.54 X10(3) UL (ref 0.1–1)
MONOCYTES NFR BLD AUTO: 8.1 %
NEUTROPHILS # BLD AUTO: 5.22 X10 (3) UL (ref 1.5–7.7)
NEUTROPHILS # BLD AUTO: 5.22 X10(3) UL (ref 1.5–7.7)
NEUTROPHILS NFR BLD AUTO: 78.5 %
OSMOLALITY SERPL CALC.SUM OF ELEC: 284 MOSM/KG (ref 275–295)
PLATELET # BLD AUTO: 235 10(3)UL (ref 150–450)
POTASSIUM SERPL-SCNC: 3 MMOL/L (ref 3.5–5.1)
PROT SERPL-MCNC: 8.4 G/DL (ref 6.4–8.2)
PSA SERPL-MCNC: <0.01 NG/ML (ref ?–4)
RBC # BLD AUTO: 3.46 X10(6)UL
SODIUM SERPL-SCNC: 137 MMOL/L (ref 136–145)
TESTOST SERPL-MCNC: <7 NG/DL
TIBC SERPL-MCNC: 411 UG/DL (ref 240–450)
TRANSFERRIN SERPL-MCNC: 276 MG/DL (ref 200–360)
WBC # BLD AUTO: 6.7 X10(3) UL (ref 4–11)

## 2022-06-06 PROCEDURE — 82728 ASSAY OF FERRITIN: CPT

## 2022-06-06 PROCEDURE — 99215 OFFICE O/P EST HI 40 MIN: CPT | Performed by: INTERNAL MEDICINE

## 2022-06-06 PROCEDURE — 36415 COLL VENOUS BLD VENIPUNCTURE: CPT

## 2022-06-06 PROCEDURE — 85025 COMPLETE CBC W/AUTO DIFF WBC: CPT

## 2022-06-06 PROCEDURE — 80053 COMPREHEN METABOLIC PANEL: CPT

## 2022-06-06 PROCEDURE — 84466 ASSAY OF TRANSFERRIN: CPT

## 2022-06-06 PROCEDURE — 82378 CARCINOEMBRYONIC ANTIGEN: CPT

## 2022-06-06 PROCEDURE — 84153 ASSAY OF PSA TOTAL: CPT

## 2022-06-06 PROCEDURE — 96402 CHEMO HORMON ANTINEOPL SQ/IM: CPT

## 2022-06-06 PROCEDURE — 83540 ASSAY OF IRON: CPT

## 2022-06-06 PROCEDURE — 84403 ASSAY OF TOTAL TESTOSTERONE: CPT

## 2022-06-06 NOTE — PROGRESS NOTES
[employer] is here for  Q3 month labs, Lupron inj and to see Dr Ryan Alvarez  Reports concerns - will see Dr Ryan Alvarez today. + blood in stool about q 3 days (cbc, iron drawn, of note)  Does have hot flashes, \"I expect this. \"        Lupron administered left upper outer glut IM, per preference, tolerated well  2x2 gauze/ paper tape to site    Discharged stable to lobby   Await hem/ onc appt with Dr Ryan Alvarez today

## 2022-06-22 ENCOUNTER — TELEPHONE (OUTPATIENT)
Dept: RADIATION ONCOLOGY | Facility: HOSPITAL | Age: 72
End: 2022-06-22

## 2022-07-08 ENCOUNTER — TELEPHONE (OUTPATIENT)
Dept: HEMATOLOGY/ONCOLOGY | Facility: HOSPITAL | Age: 72
End: 2022-07-08

## 2022-07-14 ENCOUNTER — HOSPITAL ENCOUNTER (OUTPATIENT)
Dept: CT IMAGING | Facility: HOSPITAL | Age: 72
Discharge: HOME | End: 2022-07-14
Attending: INTERNAL MEDICINE
Payer: MEDICARE

## 2022-07-14 DIAGNOSIS — C19 RECTOSIGMOID CANCER (HCC): ICD-10-CM

## 2022-07-14 LAB — CREAT BLD-MCNC: 1 MG/DL

## 2022-07-14 PROCEDURE — 82565 ASSAY OF CREATININE: CPT

## 2022-07-14 PROCEDURE — 74177 CT ABD & PELVIS W/CONTRAST: CPT | Performed by: INTERNAL MEDICINE

## 2022-07-14 PROCEDURE — 71260 CT THORAX DX C+: CPT | Performed by: INTERNAL MEDICINE

## 2022-07-15 DIAGNOSIS — C61 PROSTATE CANCER (HCC): Primary | ICD-10-CM

## 2022-07-18 ENCOUNTER — APPOINTMENT (OUTPATIENT)
Dept: HEMATOLOGY/ONCOLOGY | Facility: HOSPITAL | Age: 72
End: 2022-07-18
Attending: INTERNAL MEDICINE
Payer: MEDICARE

## 2022-07-18 ENCOUNTER — NURSE ONLY (OUTPATIENT)
Dept: HEMATOLOGY/ONCOLOGY | Facility: HOSPITAL | Age: 72
End: 2022-07-18
Attending: INTERNAL MEDICINE

## 2022-07-18 ENCOUNTER — OFFICE VISIT (OUTPATIENT)
Dept: HEMATOLOGY/ONCOLOGY | Facility: HOSPITAL | Age: 72
End: 2022-07-18
Attending: RADIOLOGY
Payer: MEDICARE

## 2022-07-18 VITALS
WEIGHT: 167 LBS | DIASTOLIC BLOOD PRESSURE: 73 MMHG | OXYGEN SATURATION: 97 % | RESPIRATION RATE: 16 BRPM | HEIGHT: 68 IN | HEART RATE: 91 BPM | TEMPERATURE: 99 F | SYSTOLIC BLOOD PRESSURE: 157 MMHG | BODY MASS INDEX: 25.31 KG/M2

## 2022-07-18 DIAGNOSIS — Z79.899 OTHER LONG TERM (CURRENT) DRUG THERAPY: ICD-10-CM

## 2022-07-18 DIAGNOSIS — E61.1 IRON DEFICIENCY: ICD-10-CM

## 2022-07-18 DIAGNOSIS — C19 RECTOSIGMOID CANCER (HCC): ICD-10-CM

## 2022-07-18 DIAGNOSIS — Z51.11 CHEMOTHERAPY MANAGEMENT, ENCOUNTER FOR: ICD-10-CM

## 2022-07-18 DIAGNOSIS — C61 PROSTATE CANCER (HCC): ICD-10-CM

## 2022-07-18 DIAGNOSIS — C61 PROSTATE CANCER (HCC): Primary | ICD-10-CM

## 2022-07-18 LAB
ALBUMIN SERPL-MCNC: 3.7 G/DL (ref 3.4–5)
ALBUMIN/GLOB SERPL: 0.9 {RATIO} (ref 1–2)
ALP LIVER SERPL-CCNC: 59 U/L
ALT SERPL-CCNC: 30 U/L
ANION GAP SERPL CALC-SCNC: 10 MMOL/L (ref 0–18)
AST SERPL-CCNC: 39 U/L (ref 15–37)
BASOPHILS # BLD AUTO: 0.04 X10(3) UL (ref 0–0.2)
BASOPHILS NFR BLD AUTO: 1.2 %
BILIRUB SERPL-MCNC: 0.3 MG/DL (ref 0.1–2)
BUN BLD-MCNC: 7 MG/DL (ref 7–18)
BUN/CREAT SERPL: 9.9 (ref 10–20)
CALCIUM BLD-MCNC: 8.4 MG/DL (ref 8.5–10.1)
CEA SERPL-MCNC: 1.9 NG/ML (ref ?–5)
CHLORIDE SERPL-SCNC: 106 MMOL/L (ref 98–112)
CO2 SERPL-SCNC: 24 MMOL/L (ref 21–32)
CREAT BLD-MCNC: 0.71 MG/DL
DEPRECATED HBV CORE AB SER IA-ACNC: 32.1 NG/ML
DEPRECATED RDW RBC AUTO: 47.2 FL (ref 35.1–46.3)
EOSINOPHIL # BLD AUTO: 0.12 X10(3) UL (ref 0–0.7)
EOSINOPHIL NFR BLD AUTO: 3.6 %
ERYTHROCYTE [DISTWIDTH] IN BLOOD BY AUTOMATED COUNT: 13.3 % (ref 11–15)
GLOBULIN PLAS-MCNC: 4.2 G/DL (ref 2.8–4.4)
GLUCOSE BLD-MCNC: 106 MG/DL (ref 70–99)
HCT VFR BLD AUTO: 33.9 %
HGB BLD-MCNC: 11.3 G/DL
IMM GRANULOCYTES # BLD AUTO: 0.04 X10(3) UL (ref 0–1)
IMM GRANULOCYTES NFR BLD: 1.2 %
IRON SATN MFR SERPL: 13 %
IRON SERPL-MCNC: 46 UG/DL
LYMPHOCYTES # BLD AUTO: 1.03 X10(3) UL (ref 1–4)
LYMPHOCYTES NFR BLD AUTO: 30.7 %
MCH RBC QN AUTO: 32.7 PG (ref 26–34)
MCHC RBC AUTO-ENTMCNC: 33.3 G/DL (ref 31–37)
MCV RBC AUTO: 98 FL
MONOCYTES # BLD AUTO: 0.47 X10(3) UL (ref 0.1–1)
MONOCYTES NFR BLD AUTO: 14 %
NEUTROPHILS # BLD AUTO: 1.65 X10 (3) UL (ref 1.5–7.7)
NEUTROPHILS # BLD AUTO: 1.65 X10(3) UL (ref 1.5–7.7)
NEUTROPHILS NFR BLD AUTO: 49.3 %
OSMOLALITY SERPL CALC.SUM OF ELEC: 288 MOSM/KG (ref 275–295)
PLATELET # BLD AUTO: 197 10(3)UL (ref 150–450)
POTASSIUM SERPL-SCNC: 3.2 MMOL/L (ref 3.5–5.1)
PROT SERPL-MCNC: 7.9 G/DL (ref 6.4–8.2)
PSA SERPL-MCNC: <0.01 NG/ML (ref ?–4)
RBC # BLD AUTO: 3.46 X10(6)UL
SODIUM SERPL-SCNC: 140 MMOL/L (ref 136–145)
TESTOST SERPL-MCNC: <7 NG/DL
TIBC SERPL-MCNC: 367 UG/DL (ref 240–450)
TRANSFERRIN SERPL-MCNC: 246 MG/DL (ref 200–360)
WBC # BLD AUTO: 3.4 X10(3) UL (ref 4–11)

## 2022-07-18 PROCEDURE — 83540 ASSAY OF IRON: CPT

## 2022-07-18 PROCEDURE — 99215 OFFICE O/P EST HI 40 MIN: CPT | Performed by: INTERNAL MEDICINE

## 2022-07-18 PROCEDURE — 85025 COMPLETE CBC W/AUTO DIFF WBC: CPT

## 2022-07-18 PROCEDURE — 84153 ASSAY OF PSA TOTAL: CPT

## 2022-07-18 PROCEDURE — 82378 CARCINOEMBRYONIC ANTIGEN: CPT

## 2022-07-18 PROCEDURE — 84466 ASSAY OF TRANSFERRIN: CPT

## 2022-07-18 PROCEDURE — 82728 ASSAY OF FERRITIN: CPT

## 2022-07-18 PROCEDURE — 80053 COMPREHEN METABOLIC PANEL: CPT

## 2022-07-18 PROCEDURE — 84403 ASSAY OF TOTAL TESTOSTERONE: CPT

## 2022-07-18 PROCEDURE — 36415 COLL VENOUS BLD VENIPUNCTURE: CPT

## 2022-07-25 ENCOUNTER — OFFICE VISIT (OUTPATIENT)
Dept: INTERNAL MEDICINE CLINIC | Facility: CLINIC | Age: 72
End: 2022-07-25
Payer: MEDICARE

## 2022-07-25 VITALS
BODY MASS INDEX: 25 KG/M2 | HEART RATE: 74 BPM | DIASTOLIC BLOOD PRESSURE: 80 MMHG | TEMPERATURE: 98 F | WEIGHT: 167 LBS | SYSTOLIC BLOOD PRESSURE: 132 MMHG | OXYGEN SATURATION: 98 %

## 2022-07-25 DIAGNOSIS — I10 ESSENTIAL HYPERTENSION: Primary | ICD-10-CM

## 2022-07-25 DIAGNOSIS — C61 PROSTATE CANCER (HCC): ICD-10-CM

## 2022-07-25 DIAGNOSIS — C19 RECTOSIGMOID CANCER (HCC): ICD-10-CM

## 2022-07-25 DIAGNOSIS — R05.3 CHRONIC COUGH: ICD-10-CM

## 2022-07-25 DIAGNOSIS — Z00.00 HEALTHCARE MAINTENANCE: ICD-10-CM

## 2022-07-25 DIAGNOSIS — K21.9 GASTROESOPHAGEAL REFLUX DISEASE WITHOUT ESOPHAGITIS: ICD-10-CM

## 2022-07-25 DIAGNOSIS — Z13.6 SCREENING FOR CARDIOVASCULAR CONDITION: ICD-10-CM

## 2022-07-25 DIAGNOSIS — D50.0 IRON DEFICIENCY ANEMIA DUE TO CHRONIC BLOOD LOSS: ICD-10-CM

## 2022-07-25 DIAGNOSIS — K62.7 RADIATION INDUCED PROCTITIS: ICD-10-CM

## 2022-07-25 DIAGNOSIS — R06.09 DOE (DYSPNEA ON EXERTION): ICD-10-CM

## 2022-07-25 PROCEDURE — 99215 OFFICE O/P EST HI 40 MIN: CPT | Performed by: FAMILY MEDICINE

## 2022-07-28 RX ORDER — AMLODIPINE AND VALSARTAN 10; 320 MG/1; MG/1
1 TABLET ORAL DAILY
Qty: 90 TABLET | Refills: 1 | Status: SHIPPED | OUTPATIENT
Start: 2022-07-28

## 2022-07-28 RX ORDER — PANTOPRAZOLE SODIUM 20 MG/1
20 TABLET, DELAYED RELEASE ORAL DAILY
Qty: 90 TABLET | Refills: 1 | Status: SHIPPED | OUTPATIENT
Start: 2022-07-28

## 2022-08-04 ENCOUNTER — OFFICE VISIT (OUTPATIENT)
Dept: HEMATOLOGY/ONCOLOGY | Facility: HOSPITAL | Age: 72
End: 2022-08-04
Attending: INTERNAL MEDICINE
Payer: MEDICARE

## 2022-08-04 VITALS
HEART RATE: 96 BPM | SYSTOLIC BLOOD PRESSURE: 149 MMHG | TEMPERATURE: 98 F | RESPIRATION RATE: 16 BRPM | DIASTOLIC BLOOD PRESSURE: 77 MMHG | OXYGEN SATURATION: 96 %

## 2022-08-04 DIAGNOSIS — C61 PROSTATE CANCER (HCC): ICD-10-CM

## 2022-08-04 DIAGNOSIS — D50.0 IRON DEFICIENCY ANEMIA DUE TO CHRONIC BLOOD LOSS: Primary | ICD-10-CM

## 2022-08-04 DIAGNOSIS — E61.1 IRON DEFICIENCY: ICD-10-CM

## 2022-08-04 PROCEDURE — 96365 THER/PROPH/DIAG IV INF INIT: CPT

## 2022-08-04 PROCEDURE — 96366 THER/PROPH/DIAG IV INF ADDON: CPT

## 2022-08-04 NOTE — PROGRESS NOTES
Pt arrived for Venofer 400 mg 1/2      He's had Venofer previously and tolerated well . Given over 150 min. Observed for 30 min. Tolerated well. No s/s of reaction noted.      Discharged home ambulatory, he is aware of future appointments  Declined AVS, patient on myc menjivar, aware of 8/16 appt

## 2022-08-10 ENCOUNTER — APPOINTMENT (OUTPATIENT)
Dept: HEMATOLOGY/ONCOLOGY | Facility: HOSPITAL | Age: 72
End: 2022-08-10
Attending: INTERNAL MEDICINE
Payer: MEDICARE

## 2022-08-16 ENCOUNTER — OFFICE VISIT (OUTPATIENT)
Dept: HEMATOLOGY/ONCOLOGY | Facility: HOSPITAL | Age: 72
End: 2022-08-16
Attending: INTERNAL MEDICINE
Payer: MEDICARE

## 2022-08-16 VITALS
OXYGEN SATURATION: 96 % | SYSTOLIC BLOOD PRESSURE: 108 MMHG | TEMPERATURE: 98 F | HEART RATE: 88 BPM | RESPIRATION RATE: 16 BRPM | DIASTOLIC BLOOD PRESSURE: 67 MMHG

## 2022-08-16 DIAGNOSIS — D50.0 IRON DEFICIENCY ANEMIA DUE TO CHRONIC BLOOD LOSS: Primary | ICD-10-CM

## 2022-08-16 DIAGNOSIS — E61.1 IRON DEFICIENCY: ICD-10-CM

## 2022-08-16 DIAGNOSIS — C61 PROSTATE CANCER (HCC): ICD-10-CM

## 2022-08-16 PROCEDURE — 96366 THER/PROPH/DIAG IV INF ADDON: CPT

## 2022-08-16 PROCEDURE — 96365 THER/PROPH/DIAG IV INF INIT: CPT

## 2022-08-16 NOTE — PROGRESS NOTES
Pt arrived for Venofer 400 mg 2 of 2. Pt states some fatigue in the evenings. Tolerated first dose of Venofer well with no issues. PIV to left hand with good blood return. Venofer infused over 150 min. Observed for 30 minutes post-infusion. Tolerated well. No s/s of reaction noted during infusion or during observation period  Post-observation VS WNL. PIV flushed and removed. Site covered with gauze and coban. Discharged stable and ambulatory. Aware of future appointments.

## 2022-09-06 ENCOUNTER — APPOINTMENT (OUTPATIENT)
Dept: HEMATOLOGY/ONCOLOGY | Facility: HOSPITAL | Age: 72
End: 2022-09-06
Attending: INTERNAL MEDICINE
Payer: MEDICARE

## 2022-09-08 ENCOUNTER — NURSE ONLY (OUTPATIENT)
Dept: HEMATOLOGY/ONCOLOGY | Facility: HOSPITAL | Age: 72
End: 2022-09-08
Attending: INTERNAL MEDICINE
Payer: MEDICARE

## 2022-09-08 VITALS
HEIGHT: 68 IN | BODY MASS INDEX: 25.46 KG/M2 | OXYGEN SATURATION: 96 % | SYSTOLIC BLOOD PRESSURE: 142 MMHG | HEART RATE: 83 BPM | RESPIRATION RATE: 18 BRPM | DIASTOLIC BLOOD PRESSURE: 68 MMHG | TEMPERATURE: 99 F | WEIGHT: 168 LBS

## 2022-09-08 DIAGNOSIS — C19 RECTOSIGMOID CANCER (HCC): ICD-10-CM

## 2022-09-08 DIAGNOSIS — Z51.11 CHEMOTHERAPY MANAGEMENT, ENCOUNTER FOR: ICD-10-CM

## 2022-09-08 DIAGNOSIS — E61.1 IRON DEFICIENCY: ICD-10-CM

## 2022-09-08 DIAGNOSIS — C61 PROSTATE CANCER (HCC): ICD-10-CM

## 2022-09-08 DIAGNOSIS — C61 PROSTATE CANCER (HCC): Primary | ICD-10-CM

## 2022-09-08 DIAGNOSIS — D50.0 IRON DEFICIENCY ANEMIA DUE TO CHRONIC BLOOD LOSS: Primary | ICD-10-CM

## 2022-09-08 LAB
ALBUMIN SERPL-MCNC: 3.9 G/DL (ref 3.4–5)
ALBUMIN/GLOB SERPL: 1.1 {RATIO} (ref 1–2)
ALP LIVER SERPL-CCNC: 43 U/L
ALT SERPL-CCNC: 30 U/L
ANION GAP SERPL CALC-SCNC: 10 MMOL/L (ref 0–18)
AST SERPL-CCNC: 29 U/L (ref 15–37)
BASOPHILS # BLD AUTO: 0.03 X10(3) UL (ref 0–0.2)
BASOPHILS NFR BLD AUTO: 1.1 %
BILIRUB SERPL-MCNC: 0.4 MG/DL (ref 0.1–2)
BUN BLD-MCNC: 14 MG/DL (ref 7–18)
BUN/CREAT SERPL: 20.6 (ref 10–20)
CALCIUM BLD-MCNC: 8.8 MG/DL (ref 8.5–10.1)
CEA SERPL-MCNC: 1.6 NG/ML (ref ?–5)
CHLORIDE SERPL-SCNC: 106 MMOL/L (ref 98–112)
CO2 SERPL-SCNC: 24 MMOL/L (ref 21–32)
CREAT BLD-MCNC: 0.68 MG/DL
DEPRECATED HBV CORE AB SER IA-ACNC: 137.4 NG/ML
DEPRECATED RDW RBC AUTO: 59.2 FL (ref 35.1–46.3)
EOSINOPHIL # BLD AUTO: 0.02 X10(3) UL (ref 0–0.7)
EOSINOPHIL NFR BLD AUTO: 0.7 %
ERYTHROCYTE [DISTWIDTH] IN BLOOD BY AUTOMATED COUNT: 15.9 % (ref 11–15)
GFR SERPLBLD BASED ON 1.73 SQ M-ARVRAT: 99 ML/MIN/1.73M2 (ref 60–?)
GLOBULIN PLAS-MCNC: 3.7 G/DL (ref 2.8–4.4)
GLUCOSE BLD-MCNC: 121 MG/DL (ref 70–99)
HCT VFR BLD AUTO: 31.6 %
HGB BLD-MCNC: 10.1 G/DL
IMM GRANULOCYTES # BLD AUTO: 0.03 X10(3) UL (ref 0–1)
IMM GRANULOCYTES NFR BLD: 1.1 %
IRON SATN MFR SERPL: 17 %
IRON SERPL-MCNC: 70 UG/DL
LYMPHOCYTES # BLD AUTO: 0.75 X10(3) UL (ref 1–4)
LYMPHOCYTES NFR BLD AUTO: 26.7 %
MCH RBC QN AUTO: 32.8 PG (ref 26–34)
MCHC RBC AUTO-ENTMCNC: 32 G/DL (ref 31–37)
MCV RBC AUTO: 102.6 FL
MONOCYTES # BLD AUTO: 0.28 X10(3) UL (ref 0.1–1)
MONOCYTES NFR BLD AUTO: 10 %
NEUTROPHILS # BLD AUTO: 1.7 X10 (3) UL (ref 1.5–7.7)
NEUTROPHILS # BLD AUTO: 1.7 X10(3) UL (ref 1.5–7.7)
NEUTROPHILS NFR BLD AUTO: 60.4 %
OSMOLALITY SERPL CALC.SUM OF ELEC: 292 MOSM/KG (ref 275–295)
PLATELET # BLD AUTO: 178 10(3)UL (ref 150–450)
POTASSIUM SERPL-SCNC: 3.3 MMOL/L (ref 3.5–5.1)
PROT SERPL-MCNC: 7.6 G/DL (ref 6.4–8.2)
PSA SERPL-MCNC: <0.01 NG/ML (ref ?–4)
RBC # BLD AUTO: 3.08 X10(6)UL
SODIUM SERPL-SCNC: 140 MMOL/L (ref 136–145)
TESTOST SERPL-MCNC: <7 NG/DL
TIBC SERPL-MCNC: 401 UG/DL (ref 240–450)
TRANSFERRIN SERPL-MCNC: 269 MG/DL (ref 200–360)
WBC # BLD AUTO: 2.8 X10(3) UL (ref 4–11)

## 2022-09-08 PROCEDURE — 82728 ASSAY OF FERRITIN: CPT

## 2022-09-08 PROCEDURE — 99215 OFFICE O/P EST HI 40 MIN: CPT | Performed by: INTERNAL MEDICINE

## 2022-09-08 PROCEDURE — 85025 COMPLETE CBC W/AUTO DIFF WBC: CPT

## 2022-09-08 PROCEDURE — 84403 ASSAY OF TOTAL TESTOSTERONE: CPT

## 2022-09-08 PROCEDURE — 84466 ASSAY OF TRANSFERRIN: CPT

## 2022-09-08 PROCEDURE — 96402 CHEMO HORMON ANTINEOPL SQ/IM: CPT

## 2022-09-08 PROCEDURE — 84153 ASSAY OF PSA TOTAL: CPT

## 2022-09-08 PROCEDURE — 82378 CARCINOEMBRYONIC ANTIGEN: CPT

## 2022-09-08 PROCEDURE — 83540 ASSAY OF IRON: CPT

## 2022-09-08 PROCEDURE — 36415 COLL VENOUS BLD VENIPUNCTURE: CPT

## 2022-09-08 PROCEDURE — 80053 COMPREHEN METABOLIC PANEL: CPT

## 2022-09-08 NOTE — PROGRESS NOTES
Rehan Guerrero is here for  Q3 month labs, Lupron inj and to see Dr Syed Santiago  Reports concerns - will see Dr Syed Santiago today. + blood in stool since Feb - had colonoscopy with Dr Daiana Ricks in April, he says  He wonders who to tell, how to get help. States Dr Daiana Ricks told him RT changes in colon visualized. He will see Dr Ximena White this month. Enc'd him to discuss further with Dr Syed Santiago, brando since labs drawn now. He agreed. Does have hot flashes, \"I expect this. \"      Lupron administered left upper outer glut IM, per preference, tolerated well - needs to hold onto something to stand.   2x2 gauze/ paper tape to site    Discharged stable to lobby with future appts - given calendar  Gait slow, steady  Await hem/ onc appt with Dr Syed Santiago today

## 2022-09-09 ENCOUNTER — TELEPHONE (OUTPATIENT)
Dept: INTERNAL MEDICINE CLINIC | Facility: CLINIC | Age: 72
End: 2022-09-09

## 2022-09-09 NOTE — TELEPHONE ENCOUNTER
----- Message from Juan Farmer MD sent at 9/7/2022  3:36 PM CDT -----  Please let him know that I spoke to his 2 specialists  and they are ok with proceeding with pelvic therapy if he still desires it. Ok to put referral if he is interested  ----- Message -----  From: Ángela Lane MD  Sent: 8/1/2022   7:25 AM CDT  To: MD Telly Ritchie,    Pelvic floor therapy obv won't help for the bleeding but may certainly be useful to help with incontinence. Thanks! Thiago Manish  ----- Message -----  From: Mickel Gosselin, MD  Sent: 7/29/2022  10:47 AM CDT  To: MD Ruben Griggs,    I wanted to get your input in regards to our mutual patient. 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 Although this could be expected with radiation, as precaution he underwent a colonoscopy in 4/2022. Would pelvic therapy be useful for him to help with any potential bowel dysfunction or would this not be considered helpful in his case?      Thanks,   Diya Rizvi

## 2022-09-09 NOTE — TELEPHONE ENCOUNTER
Straight to voicemail. Unable to discuss further with patient. If pt desires, may enter PT pelvic therapy. Will await call back.     Close TE.

## 2022-09-12 NOTE — TELEPHONE ENCOUNTER
Patient called back. He wants to hold off on pelvic therapy. Has an upcoming appt with Dr. Letitia Elena on 9/21 - and will discuss with him.

## 2022-09-21 ENCOUNTER — OFFICE VISIT (OUTPATIENT)
Dept: RADIATION ONCOLOGY | Facility: HOSPITAL | Age: 72
End: 2022-09-21
Attending: RADIOLOGY

## 2022-09-21 VITALS
OXYGEN SATURATION: 96 % | HEART RATE: 80 BPM | DIASTOLIC BLOOD PRESSURE: 58 MMHG | RESPIRATION RATE: 18 BRPM | SYSTOLIC BLOOD PRESSURE: 90 MMHG | TEMPERATURE: 97 F | BODY MASS INDEX: 25 KG/M2 | WEIGHT: 167.38 LBS

## 2022-09-21 DIAGNOSIS — C61 PROSTATE CANCER (HCC): Primary | ICD-10-CM

## 2022-09-21 PROCEDURE — 99211 OFF/OP EST MAY X REQ PHY/QHP: CPT

## 2022-09-21 NOTE — PATIENT INSTRUCTIONS
Follow up with Dr. Holden Whitley in 6 months. Vee Arambula will call you to schedule your follow up appointment. Please call [phone] with any radiation questions. Follow up with Dr. Leonidas Dean in regard to rectal bleeding.

## 2022-10-03 ENCOUNTER — TELEPHONE (OUTPATIENT)
Dept: INTERNAL MEDICINE CLINIC | Facility: CLINIC | Age: 72
End: 2022-10-03

## 2022-10-03 NOTE — TELEPHONE ENCOUNTER
MD Ronald Ordoñez, VENTURA  Can you please sent patient a message to make sure to f/u with Dr. Fran Jones regarding his rectal bleeding per Dr. Brandon Hayes recommendations.      Thanks

## 2022-10-05 ENCOUNTER — TELEPHONE (OUTPATIENT)
Dept: HEMATOLOGY/ONCOLOGY | Facility: HOSPITAL | Age: 72
End: 2022-10-05

## 2022-10-05 NOTE — TELEPHONE ENCOUNTER
Patient rec'd information regarding his funding for zytiga. Reassured that the foundation used by Noe Castano has changed to [facility] and we will apply for assistance again for year 2023 using a different foundation. We will assist, but I requested that patient please bring in his pharmacy coverage plan ID which we can copy/scan to assist with the application process. He confirms understanding and will bring.

## 2022-10-17 ENCOUNTER — TELEPHONE (OUTPATIENT)
Facility: CLINIC | Age: 72
End: 2022-10-17

## 2022-10-17 NOTE — TELEPHONE ENCOUNTER
I spoke to the pt    He had radiation last year for 28 days    He states since then everything has changed    He has BR rectal bleeding every 2-3 days    Denies constipation or diarrhea    Colonoscopy done 04/13/22    CT abdo/chest/pelvis done 07/14/22 ordered by Dr Julieta Robbins    Asking about a procedure? ?

## 2022-10-17 NOTE — TELEPHONE ENCOUNTER
Patient is calling because he is having rectal bleeding. please call work number before 4p and mobile afterwards.  Patient refused next available appointment

## 2022-10-19 NOTE — TELEPHONE ENCOUNTER
Please offer appointment in the office at 9 AM at [address] [place] [address] Drive November 1st or November 3rd to discuss what is going on and options going forward    Thanks    Nolvia Bird MD  [facility], [facility] - Gastroenterology  10/19/2022  3:36 PM

## 2022-10-19 NOTE — TELEPHONE ENCOUNTER
Left message to call back. If patient returns call please offer appt on 11/1 or 11/3 at 9am with Dr. Nestor Durant at [address] [place] [address].

## 2022-10-27 ENCOUNTER — PATIENT MESSAGE (OUTPATIENT)
Dept: GASTROENTEROLOGY | Facility: CLINIC | Age: 72
End: 2022-10-27

## 2022-10-27 NOTE — TELEPHONE ENCOUNTER
Dr. Neema Toney,    Please see message below is it ok to offer appt on 11/1 9am as previously noted?

## 2022-10-27 NOTE — TELEPHONE ENCOUNTER
Yes, that is fine    Thanks    Karen Celis MD  [facility], [facility] - Gastroenterology  10/27/2022  4:31 PM

## 2022-10-28 ENCOUNTER — TELEPHONE (OUTPATIENT)
Dept: HEMATOLOGY/ONCOLOGY | Facility: HOSPITAL | Age: 72
End: 2022-10-28

## 2022-10-28 ENCOUNTER — TELEPHONE (OUTPATIENT)
Dept: GASTROENTEROLOGY | Facility: CLINIC | Age: 72
End: 2022-10-28

## 2022-10-28 NOTE — TELEPHONE ENCOUNTER
Noted, Five Below[employer] message sent to patient to confirm offered appt. If patient calls in please confirm appt for 11/1 at [facility].

## 2022-10-28 NOTE — TELEPHONE ENCOUNTER
Your appointments     Date & Time Appointment Department [facility])    Nov 01, 2022  9:00 AM CDT Follow Up Visit with Rolin Collet, MD [address] [facility] [place], [address], [place] ([address])       [email or web address] message sent to patient.

## 2022-11-01 ENCOUNTER — OFFICE VISIT (OUTPATIENT)
Dept: GASTROENTEROLOGY | Facility: CLINIC | Age: 72
End: 2022-11-01
Payer: MEDICARE

## 2022-11-01 VITALS
WEIGHT: 165 LBS | SYSTOLIC BLOOD PRESSURE: 110 MMHG | BODY MASS INDEX: 25.01 KG/M2 | HEIGHT: 68 IN | DIASTOLIC BLOOD PRESSURE: 64 MMHG

## 2022-11-01 DIAGNOSIS — K62.5 RECTAL BLEEDING: Primary | ICD-10-CM

## 2022-11-01 DIAGNOSIS — K62.7 RADIATION PROCTITIS: ICD-10-CM

## 2022-11-01 PROCEDURE — 99214 OFFICE O/P EST MOD 30 MIN: CPT | Performed by: INTERNAL MEDICINE

## 2022-11-23 ENCOUNTER — APPOINTMENT (OUTPATIENT)
Dept: CT IMAGING | Facility: HOSPITAL | Age: 72
End: 2022-11-23
Attending: EMERGENCY MEDICINE
Payer: MEDICARE

## 2022-11-23 ENCOUNTER — HOSPITAL ENCOUNTER (EMERGENCY)
Facility: HOSPITAL | Age: 72
Discharge: HOME | End: 2022-11-23
Attending: EMERGENCY MEDICINE
Payer: MEDICARE

## 2022-11-23 ENCOUNTER — APPOINTMENT (OUTPATIENT)
Dept: GENERAL RADIOLOGY | Facility: HOSPITAL | Age: 72
End: 2022-11-23
Attending: EMERGENCY MEDICINE
Payer: MEDICARE

## 2022-11-23 ENCOUNTER — PATIENT MESSAGE (OUTPATIENT)
Dept: INTERNAL MEDICINE CLINIC | Facility: CLINIC | Age: 72
End: 2022-11-23

## 2022-11-23 VITALS
OXYGEN SATURATION: 96 % | WEIGHT: 158 LBS | BODY MASS INDEX: 24.8 KG/M2 | RESPIRATION RATE: 18 BRPM | TEMPERATURE: 98 F | SYSTOLIC BLOOD PRESSURE: 110 MMHG | DIASTOLIC BLOOD PRESSURE: 68 MMHG | HEIGHT: 67 IN | HEART RATE: 88 BPM

## 2022-11-23 DIAGNOSIS — S00.93XA CONTUSION OF HEAD, UNSPECIFIED PART OF HEAD, INITIAL ENCOUNTER: ICD-10-CM

## 2022-11-23 DIAGNOSIS — Z78.9 ALCOHOL USE: Primary | ICD-10-CM

## 2022-11-23 DIAGNOSIS — E87.6 HYPOKALEMIA: ICD-10-CM

## 2022-11-23 DIAGNOSIS — S02.32XA CLOSED FRACTURE OF LEFT ORBITAL FLOOR, INITIAL ENCOUNTER (HCC): ICD-10-CM

## 2022-11-23 DIAGNOSIS — R53.83 OTHER FATIGUE: ICD-10-CM

## 2022-11-23 LAB
ALBUMIN SERPL-MCNC: 3.6 G/DL (ref 3.4–5)
ALP LIVER SERPL-CCNC: 48 U/L
ALT SERPL-CCNC: 39 U/L
ANION GAP SERPL CALC-SCNC: 10 MMOL/L (ref 0–18)
AST SERPL-CCNC: 48 U/L (ref 15–37)
ATRIAL RATE: 84 BPM
BASOPHILS # BLD AUTO: 0.02 X10(3) UL (ref 0–0.2)
BASOPHILS NFR BLD AUTO: 0.4 %
BILIRUB DIRECT SERPL-MCNC: 0.1 MG/DL (ref 0–0.2)
BILIRUB SERPL-MCNC: 0.3 MG/DL (ref 0.1–2)
BILIRUB UR QL CFM: NEGATIVE
BUN BLD-MCNC: 13 MG/DL (ref 7–18)
BUN/CREAT SERPL: 14.4 (ref 10–20)
CALCIUM BLD-MCNC: 9.1 MG/DL (ref 8.5–10.1)
CHLORIDE SERPL-SCNC: 100 MMOL/L (ref 98–112)
CLARITY UR: CLEAR
CO2 SERPL-SCNC: 25 MMOL/L (ref 21–32)
COLOR UR: YELLOW
CREAT BLD-MCNC: 0.9 MG/DL
DEPRECATED RDW RBC AUTO: 45.1 FL (ref 35.1–46.3)
EOSINOPHIL # BLD AUTO: 0.02 X10(3) UL (ref 0–0.7)
EOSINOPHIL NFR BLD AUTO: 0.4 %
ERYTHROCYTE [DISTWIDTH] IN BLOOD BY AUTOMATED COUNT: 13.4 % (ref 11–15)
ETHANOL SERPL-MCNC: 138 MG/DL (ref ?–3)
FLUAV + FLUBV RNA SPEC NAA+PROBE: NEGATIVE
FLUAV + FLUBV RNA SPEC NAA+PROBE: NEGATIVE
GFR SERPLBLD BASED ON 1.73 SQ M-ARVRAT: 91 ML/MIN/1.73M2 (ref 60–?)
GLUCOSE BLD-MCNC: 148 MG/DL (ref 70–99)
GLUCOSE UR-MCNC: NEGATIVE MG/DL
HCT VFR BLD AUTO: 32.3 %
HGB BLD-MCNC: 10.9 G/DL
HGB UR QL STRIP.AUTO: NEGATIVE
HYALINE CASTS #/AREA URNS AUTO: PRESENT /LPF
HYALINE CASTS #/AREA URNS AUTO: PRESENT /LPF
IMM GRANULOCYTES # BLD AUTO: 0.05 X10(3) UL (ref 0–1)
IMM GRANULOCYTES NFR BLD: 1 %
INR BLD: 0.92 (ref 0.85–1.16)
KETONES UR-MCNC: 15 MG/DL
LEUKOCYTE ESTERASE UR QL STRIP.AUTO: NEGATIVE
LYMPHOCYTES # BLD AUTO: 0.66 X10(3) UL (ref 1–4)
LYMPHOCYTES NFR BLD AUTO: 12.5 %
MCH RBC QN AUTO: 31.1 PG (ref 26–34)
MCHC RBC AUTO-ENTMCNC: 33.7 G/DL (ref 31–37)
MCV RBC AUTO: 92 FL
MONOCYTES # BLD AUTO: 0.39 X10(3) UL (ref 0.1–1)
MONOCYTES NFR BLD AUTO: 7.4 %
NEUTROPHILS # BLD AUTO: 4.12 X10 (3) UL (ref 1.5–7.7)
NEUTROPHILS # BLD AUTO: 4.12 X10(3) UL (ref 1.5–7.7)
NEUTROPHILS NFR BLD AUTO: 78.3 %
NITRITE UR QL STRIP.AUTO: NEGATIVE
OSMOLALITY SERPL CALC.SUM OF ELEC: 283 MOSM/KG (ref 275–295)
P AXIS: 70 DEGREES
P-R INTERVAL: 148 MS
PH UR: 6 [PH] (ref 5–8)
PLATELET # BLD AUTO: 231 10(3)UL (ref 150–450)
POTASSIUM SERPL-SCNC: 2.9 MMOL/L (ref 3.5–5.1)
PROT SERPL-MCNC: 8 G/DL (ref 6.4–8.2)
PROTHROMBIN TIME: 12.3 SECONDS (ref 11.6–14.8)
Q-T INTERVAL: 370 MS
QRS DURATION: 98 MS
QTC CALCULATION (BEZET): 437 MS
R AXIS: 8 DEGREES
RBC # BLD AUTO: 3.51 X10(6)UL
RSV RNA SPEC NAA+PROBE: NEGATIVE
SARS-COV-2 RNA RESP QL NAA+PROBE: NOT DETECTED
SODIUM SERPL-SCNC: 135 MMOL/L (ref 136–145)
SP GR UR STRIP: 1.01 (ref 1–1.03)
T AXIS: 29 DEGREES
TROPONIN I HIGH SENSITIVITY: 7 NG/L
UROBILINOGEN UR STRIP-ACNC: 1
VENTRICULAR RATE: 84 BPM
WBC # BLD AUTO: 5.3 X10(3) UL (ref 4–11)

## 2022-11-23 PROCEDURE — 93010 ELECTROCARDIOGRAM REPORT: CPT | Performed by: EMERGENCY MEDICINE

## 2022-11-23 PROCEDURE — 71045 X-RAY EXAM CHEST 1 VIEW: CPT | Performed by: EMERGENCY MEDICINE

## 2022-11-23 PROCEDURE — 80076 HEPATIC FUNCTION PANEL: CPT | Performed by: EMERGENCY MEDICINE

## 2022-11-23 PROCEDURE — 70450 CT HEAD/BRAIN W/O DYE: CPT | Performed by: EMERGENCY MEDICINE

## 2022-11-23 PROCEDURE — 0241U SARS-COV-2/FLU A AND B/RSV BY PCR (GENEXPERT): CPT | Performed by: EMERGENCY MEDICINE

## 2022-11-23 PROCEDURE — 70486 CT MAXILLOFACIAL W/O DYE: CPT | Performed by: EMERGENCY MEDICINE

## 2022-11-23 PROCEDURE — 93005 ELECTROCARDIOGRAM TRACING: CPT

## 2022-11-23 PROCEDURE — 81015 MICROSCOPIC EXAM OF URINE: CPT | Performed by: EMERGENCY MEDICINE

## 2022-11-23 PROCEDURE — 80048 BASIC METABOLIC PNL TOTAL CA: CPT | Performed by: EMERGENCY MEDICINE

## 2022-11-23 PROCEDURE — 99284 EMERGENCY DEPT VISIT MOD MDM: CPT

## 2022-11-23 PROCEDURE — 85025 COMPLETE CBC W/AUTO DIFF WBC: CPT | Performed by: EMERGENCY MEDICINE

## 2022-11-23 PROCEDURE — 99285 EMERGENCY DEPT VISIT HI MDM: CPT

## 2022-11-23 PROCEDURE — 81001 URINALYSIS AUTO W/SCOPE: CPT | Performed by: EMERGENCY MEDICINE

## 2022-11-23 PROCEDURE — 85610 PROTHROMBIN TIME: CPT | Performed by: EMERGENCY MEDICINE

## 2022-11-23 PROCEDURE — 96375 TX/PRO/DX INJ NEW DRUG ADDON: CPT

## 2022-11-23 PROCEDURE — 84484 ASSAY OF TROPONIN QUANT: CPT | Performed by: EMERGENCY MEDICINE

## 2022-11-23 PROCEDURE — 82077 ASSAY SPEC XCP UR&BREATH IA: CPT | Performed by: EMERGENCY MEDICINE

## 2022-11-23 PROCEDURE — 96361 HYDRATE IV INFUSION ADD-ON: CPT

## 2022-11-23 PROCEDURE — 96365 THER/PROPH/DIAG IV INF INIT: CPT

## 2022-11-23 RX ORDER — ONDANSETRON 2 MG/ML
4 INJECTION INTRAMUSCULAR; INTRAVENOUS ONCE
Status: COMPLETED | OUTPATIENT
Start: 2022-11-23 | End: 2022-11-23

## 2022-11-23 RX ORDER — POTASSIUM CHLORIDE 1.5 G/1.77G
20 POWDER, FOR SOLUTION ORAL ONCE
Status: COMPLETED | OUTPATIENT
Start: 2022-11-23 | End: 2022-11-23

## 2022-11-23 RX ORDER — POTASSIUM CHLORIDE 20 MEQ/1
40 TABLET, EXTENDED RELEASE ORAL ONCE
Status: COMPLETED | OUTPATIENT
Start: 2022-11-23 | End: 2022-11-23

## 2022-11-23 RX ORDER — MAGNESIUM SULFATE HEPTAHYDRATE 40 MG/ML
2 INJECTION, SOLUTION INTRAVENOUS ONCE
Status: COMPLETED | OUTPATIENT
Start: 2022-11-23 | End: 2022-11-23

## 2022-11-23 NOTE — TELEPHONE ENCOUNTER
Spoke to patient's daughter, patient is currently in the ED, she will call once he is discharge to see if follow up visit is needed

## 2022-11-23 NOTE — TELEPHONE ENCOUNTER
ACUTE!!!    Daughter called in regards previous message and would like to speak to doctor asap  Daughter wants to take pt to ER but needs to speak to doctor in regards pts symptoms

## 2022-11-23 NOTE — DISCHARGE INSTRUCTIONS
Please have your potassium rechecked in the next several days with your primary care provider. Please stop drinking alcohol. Return to ER for chest pain, shortness of breath, headache, weakness, worsening weakness, , inability to range your eyeballs in all direction. Follow with ENT as an outpatient as soon as possible    The Emergency Department is not intended to be a substitute for an effort to provide complete medical care. The imaging, if any, have often been interpreted on a preliminary basis pending final reading by the radiologist. If your blood pressure was greater than 140/90, please have this blood pressure rechecked by your primary care provider santa the next few days. You will benefit from a further discussion of lifestyle modifications that include Weight Reduction - Dietary Sodium Restriction - Increased Physical Activity and Moderation in alcohol (ETOH) Consumption. If possible check your pressure at home and keep a blood pressure log to bring to your physician.         Substance abuse referrals: Kimberlyn Crowell: [phone]; Henrique [facility]: [phone]; John: [phone] : [phone]

## 2022-11-23 NOTE — ED INITIAL ASSESSMENT (HPI)
Patient arrives ambulatory through triage with complaints of weakness/fatigue. Nosebleed last night- no bleeding currently. Patient's daughter reports that she believes he fainted this morning and hit his head. Patient doesn't remember this. No blood thinners.

## 2022-11-26 ENCOUNTER — PATIENT MESSAGE (OUTPATIENT)
Dept: INTERNAL MEDICINE CLINIC | Facility: CLINIC | Age: 72
End: 2022-11-26

## 2022-11-28 ENCOUNTER — TELEPHONE (OUTPATIENT)
Dept: INTERNAL MEDICINE CLINIC | Facility: CLINIC | Age: 72
End: 2022-11-28

## 2022-11-28 NOTE — TELEPHONE ENCOUNTER
Daughter called requesting an ER follow up appt, if its not with Dr EVANS then with Dr Sona Fox  Daughter does not want to wait until December    Please advise

## 2022-11-29 NOTE — TELEPHONE ENCOUNTER
Spoke with Daughter to schedule patient in. Next Appt:    With Internal Medicine (Giuliana Amaya MD)  01/26/2023 at 10:20 AM

## 2022-11-30 NOTE — TELEPHONE ENCOUNTER
Ok to move patient visit to today instead of tomorrow. Please offer the daughter an appointment for today at 2pm for 40min visit.  [id number] Karine de santiago MD.

## 2022-12-01 ENCOUNTER — OFFICE VISIT (OUTPATIENT)
Dept: INTERNAL MEDICINE CLINIC | Facility: CLINIC | Age: 72
End: 2022-12-01
Payer: MEDICARE

## 2022-12-01 ENCOUNTER — LAB ENCOUNTER (OUTPATIENT)
Dept: LAB | Facility: HOSPITAL | Age: 72
End: 2022-12-01
Attending: FAMILY MEDICINE
Payer: MEDICARE

## 2022-12-01 VITALS
BODY MASS INDEX: 25 KG/M2 | WEIGHT: 159 LBS | TEMPERATURE: 99 F | OXYGEN SATURATION: 100 % | DIASTOLIC BLOOD PRESSURE: 84 MMHG | HEART RATE: 94 BPM | SYSTOLIC BLOOD PRESSURE: 128 MMHG

## 2022-12-01 DIAGNOSIS — E46 MALNUTRITION, UNSPECIFIED TYPE (HCC): ICD-10-CM

## 2022-12-01 DIAGNOSIS — E87.6 HYPOKALEMIA: ICD-10-CM

## 2022-12-01 DIAGNOSIS — I10 ESSENTIAL HYPERTENSION: ICD-10-CM

## 2022-12-01 DIAGNOSIS — C19 RECTOSIGMOID CANCER (HCC): Primary | ICD-10-CM

## 2022-12-01 DIAGNOSIS — K62.7 RADIATION INDUCED PROCTITIS: ICD-10-CM

## 2022-12-01 DIAGNOSIS — S02.85XA CLOSED FRACTURE OF ORBIT, INITIAL ENCOUNTER (HCC): ICD-10-CM

## 2022-12-01 DIAGNOSIS — D50.0 IRON DEFICIENCY ANEMIA DUE TO CHRONIC BLOOD LOSS: ICD-10-CM

## 2022-12-01 DIAGNOSIS — C61 PROSTATE CANCER (HCC): ICD-10-CM

## 2022-12-01 LAB
ANION GAP SERPL CALC-SCNC: 9 MMOL/L (ref 0–18)
BUN BLD-MCNC: 13 MG/DL (ref 7–18)
BUN/CREAT SERPL: 17.6 (ref 10–20)
CALCIUM BLD-MCNC: 9.8 MG/DL (ref 8.5–10.1)
CHLORIDE SERPL-SCNC: 99 MMOL/L (ref 98–112)
CO2 SERPL-SCNC: 27 MMOL/L (ref 21–32)
CREAT BLD-MCNC: 0.74 MG/DL
FASTING STATUS PATIENT QL REPORTED: YES
GFR SERPLBLD BASED ON 1.73 SQ M-ARVRAT: 97 ML/MIN/1.73M2 (ref 60–?)
GLUCOSE BLD-MCNC: 117 MG/DL (ref 70–99)
OSMOLALITY SERPL CALC.SUM OF ELEC: 281 MOSM/KG (ref 275–295)
POTASSIUM SERPL-SCNC: 3.6 MMOL/L (ref 3.5–5.1)
SODIUM SERPL-SCNC: 135 MMOL/L (ref 136–145)

## 2022-12-01 PROCEDURE — 36415 COLL VENOUS BLD VENIPUNCTURE: CPT

## 2022-12-01 PROCEDURE — 80048 BASIC METABOLIC PNL TOTAL CA: CPT

## 2022-12-01 PROCEDURE — 1126F AMNT PAIN NOTED NONE PRSNT: CPT | Performed by: FAMILY MEDICINE

## 2022-12-01 PROCEDURE — 99215 OFFICE O/P EST HI 40 MIN: CPT | Performed by: FAMILY MEDICINE

## 2022-12-05 ENCOUNTER — APPOINTMENT (OUTPATIENT)
Dept: HEMATOLOGY/ONCOLOGY | Facility: HOSPITAL | Age: 72
End: 2022-12-05
Attending: INTERNAL MEDICINE
Payer: MEDICARE

## 2022-12-08 ENCOUNTER — APPOINTMENT (OUTPATIENT)
Dept: HEMATOLOGY/ONCOLOGY | Facility: HOSPITAL | Age: 72
End: 2022-12-08
Attending: INTERNAL MEDICINE
Payer: MEDICARE

## 2022-12-13 ENCOUNTER — OFFICE VISIT (OUTPATIENT)
Dept: HEMATOLOGY/ONCOLOGY | Facility: HOSPITAL | Age: 72
End: 2022-12-13
Attending: INTERNAL MEDICINE

## 2022-12-13 ENCOUNTER — NURSE ONLY (OUTPATIENT)
Dept: HEMATOLOGY/ONCOLOGY | Facility: HOSPITAL | Age: 72
End: 2022-12-13
Attending: INTERNAL MEDICINE
Payer: MEDICARE

## 2022-12-13 VITALS
HEART RATE: 86 BPM | SYSTOLIC BLOOD PRESSURE: 161 MMHG | HEIGHT: 68 IN | WEIGHT: 159 LBS | TEMPERATURE: 99 F | OXYGEN SATURATION: 96 % | BODY MASS INDEX: 24.1 KG/M2 | DIASTOLIC BLOOD PRESSURE: 80 MMHG | RESPIRATION RATE: 18 BRPM

## 2022-12-13 DIAGNOSIS — D50.0 IRON DEFICIENCY ANEMIA DUE TO CHRONIC BLOOD LOSS: Primary | ICD-10-CM

## 2022-12-13 DIAGNOSIS — Z79.818 ANDROGEN DEPRIVATION THERAPY: ICD-10-CM

## 2022-12-13 DIAGNOSIS — E61.1 IRON DEFICIENCY: ICD-10-CM

## 2022-12-13 DIAGNOSIS — C19 RECTOSIGMOID CANCER (HCC): ICD-10-CM

## 2022-12-13 DIAGNOSIS — Z51.11 CHEMOTHERAPY MANAGEMENT, ENCOUNTER FOR: ICD-10-CM

## 2022-12-13 DIAGNOSIS — D50.0 IRON DEFICIENCY ANEMIA DUE TO CHRONIC BLOOD LOSS: ICD-10-CM

## 2022-12-13 DIAGNOSIS — C61 PROSTATE CANCER (HCC): Primary | ICD-10-CM

## 2022-12-13 DIAGNOSIS — C61 PROSTATE CANCER (HCC): ICD-10-CM

## 2022-12-13 LAB
ALBUMIN SERPL-MCNC: 3.9 G/DL (ref 3.4–5)
ALBUMIN/GLOB SERPL: 0.8 {RATIO} (ref 1–2)
ALP LIVER SERPL-CCNC: 91 U/L
ALT SERPL-CCNC: 39 U/L
ANION GAP SERPL CALC-SCNC: 9 MMOL/L (ref 0–18)
AST SERPL-CCNC: 36 U/L (ref 15–37)
BASOPHILS # BLD AUTO: 0.02 X10(3) UL (ref 0–0.2)
BASOPHILS NFR BLD AUTO: 0.5 %
BILIRUB SERPL-MCNC: 0.4 MG/DL (ref 0.1–2)
BUN BLD-MCNC: 12 MG/DL (ref 7–18)
BUN/CREAT SERPL: 16.4 (ref 10–20)
CALCIUM BLD-MCNC: 9.4 MG/DL (ref 8.5–10.1)
CEA SERPL-MCNC: 1.2 NG/ML (ref ?–5)
CHLORIDE SERPL-SCNC: 105 MMOL/L (ref 98–112)
CO2 SERPL-SCNC: 25 MMOL/L (ref 21–32)
CREAT BLD-MCNC: 0.73 MG/DL
DEPRECATED HBV CORE AB SER IA-ACNC: 18.3 NG/ML
DEPRECATED RDW RBC AUTO: 43.8 FL (ref 35.1–46.3)
EOSINOPHIL # BLD AUTO: 0.04 X10(3) UL (ref 0–0.7)
EOSINOPHIL NFR BLD AUTO: 0.9 %
ERYTHROCYTE [DISTWIDTH] IN BLOOD BY AUTOMATED COUNT: 13.2 % (ref 11–15)
GFR SERPLBLD BASED ON 1.73 SQ M-ARVRAT: 97 ML/MIN/1.73M2 (ref 60–?)
GLOBULIN PLAS-MCNC: 4.6 G/DL (ref 2.8–4.4)
GLUCOSE BLD-MCNC: 118 MG/DL (ref 70–99)
HCT VFR BLD AUTO: 33.3 %
HGB BLD-MCNC: 10.7 G/DL
IMM GRANULOCYTES # BLD AUTO: 0.03 X10(3) UL (ref 0–1)
IMM GRANULOCYTES NFR BLD: 0.7 %
IRON SATN MFR SERPL: 7 %
IRON SERPL-MCNC: 31 UG/DL
LYMPHOCYTES # BLD AUTO: 0.67 X10(3) UL (ref 1–4)
LYMPHOCYTES NFR BLD AUTO: 15.7 %
MCH RBC QN AUTO: 28.8 PG (ref 26–34)
MCHC RBC AUTO-ENTMCNC: 32.1 G/DL (ref 31–37)
MCV RBC AUTO: 89.8 FL
MONOCYTES # BLD AUTO: 0.28 X10(3) UL (ref 0.1–1)
MONOCYTES NFR BLD AUTO: 6.5 %
NEUTROPHILS # BLD AUTO: 3.24 X10 (3) UL (ref 1.5–7.7)
NEUTROPHILS # BLD AUTO: 3.24 X10(3) UL (ref 1.5–7.7)
NEUTROPHILS NFR BLD AUTO: 75.7 %
OSMOLALITY SERPL CALC.SUM OF ELEC: 289 MOSM/KG (ref 275–295)
PLATELET # BLD AUTO: 222 10(3)UL (ref 150–450)
POTASSIUM SERPL-SCNC: 3.7 MMOL/L (ref 3.5–5.1)
PROT SERPL-MCNC: 8.5 G/DL (ref 6.4–8.2)
PSA SERPL-MCNC: <0.01 NG/ML (ref ?–4)
RBC # BLD AUTO: 3.71 X10(6)UL
SODIUM SERPL-SCNC: 139 MMOL/L (ref 136–145)
TESTOST SERPL-MCNC: <7 NG/DL
TIBC SERPL-MCNC: 460 UG/DL (ref 240–450)
TRANSFERRIN SERPL-MCNC: 309 MG/DL (ref 200–360)
WBC # BLD AUTO: 4.3 X10(3) UL (ref 4–11)

## 2022-12-13 PROCEDURE — 82378 CARCINOEMBRYONIC ANTIGEN: CPT

## 2022-12-13 PROCEDURE — 82728 ASSAY OF FERRITIN: CPT

## 2022-12-13 PROCEDURE — 99215 OFFICE O/P EST HI 40 MIN: CPT | Performed by: INTERNAL MEDICINE

## 2022-12-13 PROCEDURE — 36415 COLL VENOUS BLD VENIPUNCTURE: CPT

## 2022-12-13 PROCEDURE — 84403 ASSAY OF TOTAL TESTOSTERONE: CPT

## 2022-12-13 PROCEDURE — 84466 ASSAY OF TRANSFERRIN: CPT

## 2022-12-13 PROCEDURE — 84153 ASSAY OF PSA TOTAL: CPT

## 2022-12-13 PROCEDURE — 96402 CHEMO HORMON ANTINEOPL SQ/IM: CPT

## 2022-12-13 PROCEDURE — 83540 ASSAY OF IRON: CPT

## 2022-12-13 PROCEDURE — 85025 COMPLETE CBC W/AUTO DIFF WBC: CPT

## 2022-12-13 PROCEDURE — 80053 COMPREHEN METABOLIC PANEL: CPT

## 2022-12-13 NOTE — PROGRESS NOTES
Labs drawn as ordered then sent. Lupron given in Left upper outer gluteal muscle. ( patient prefers to receive injection on Left gluteal muscle)  Site covered with a band-aide. Discharged back to lobby, will see Dr Filemon Ojeda today.

## 2022-12-20 ENCOUNTER — TELEPHONE (OUTPATIENT)
Dept: HEMATOLOGY/ONCOLOGY | Facility: HOSPITAL | Age: 72
End: 2022-12-20

## 2022-12-20 NOTE — TELEPHONE ENCOUNTER
Called patient daughter to schedule appointment for iron infusion ordered by Dr Stephanie Alfonso. No answer did leave a message on voicemail.

## 2022-12-30 RX ORDER — AMLODIPINE AND VALSARTAN 10; 320 MG/1; MG/1
1 TABLET ORAL DAILY
Qty: 90 TABLET | Refills: 1 | Status: SHIPPED | OUTPATIENT
Start: 2022-12-30

## 2023-01-04 ENCOUNTER — OFFICE VISIT (OUTPATIENT)
Dept: HEMATOLOGY/ONCOLOGY | Facility: HOSPITAL | Age: 73
End: 2023-01-04
Attending: INTERNAL MEDICINE
Payer: MEDICARE

## 2023-01-04 VITALS
DIASTOLIC BLOOD PRESSURE: 70 MMHG | TEMPERATURE: 98 F | HEART RATE: 99 BPM | SYSTOLIC BLOOD PRESSURE: 142 MMHG | OXYGEN SATURATION: 97 % | RESPIRATION RATE: 18 BRPM

## 2023-01-04 DIAGNOSIS — D50.0 IRON DEFICIENCY ANEMIA DUE TO CHRONIC BLOOD LOSS: Primary | ICD-10-CM

## 2023-01-04 DIAGNOSIS — C61 PROSTATE CANCER (HCC): ICD-10-CM

## 2023-01-04 DIAGNOSIS — E61.1 IRON DEFICIENCY: ICD-10-CM

## 2023-01-04 PROCEDURE — 96365 THER/PROPH/DIAG IV INF INIT: CPT

## 2023-01-04 NOTE — PROGRESS NOTES
[employer] arrives to the infusion area Iron Dextran infusion. Patient has received Iron Dextran in the past without sign's/symptom's of an infusion reaction present. PIV placed with good blood return noted. Iron Dextran infused over 1hr. Observed for 30 minutes post infusion. No complaints or adverse reaction noted. Discharged home and has follow up scheduled with MD in March.

## 2023-01-24 ENCOUNTER — OFFICE VISIT (OUTPATIENT)
Dept: GASTROENTEROLOGY | Facility: CLINIC | Age: 73
End: 2023-01-24

## 2023-01-24 ENCOUNTER — TELEPHONE (OUTPATIENT)
Dept: GASTROENTEROLOGY | Facility: CLINIC | Age: 73
End: 2023-01-24

## 2023-01-24 VITALS
WEIGHT: 158.63 LBS | SYSTOLIC BLOOD PRESSURE: 103 MMHG | DIASTOLIC BLOOD PRESSURE: 62 MMHG | HEART RATE: 83 BPM | BODY MASS INDEX: 24.04 KG/M2 | HEIGHT: 68 IN

## 2023-01-24 DIAGNOSIS — K62.5 RECTAL BLEEDING: Primary | ICD-10-CM

## 2023-01-24 DIAGNOSIS — K74.00 LIVER FIBROSIS: ICD-10-CM

## 2023-01-24 DIAGNOSIS — K62.7 RADIATION PROCTITIS: ICD-10-CM

## 2023-01-24 PROCEDURE — 1126F AMNT PAIN NOTED NONE PRSNT: CPT | Performed by: INTERNAL MEDICINE

## 2023-01-24 PROCEDURE — 99214 OFFICE O/P EST MOD 30 MIN: CPT | Performed by: INTERNAL MEDICINE

## 2023-01-24 NOTE — PATIENT INSTRUCTIONS
You should start carafate/sucralfate enemas which I will send to the pharmacy  Schedule the ultrasound of your liver  Follow up with me in 3 months  Make an appointment with a hepatologist    Please call and schedule an appointment with Dr. López Little from the [facility] who [place] [facility] [place] a month. You can call [phone] to schedule an appointment with him. [address]

## 2023-01-24 NOTE — TELEPHONE ENCOUNTER
GI staff:    Please assist with sending prescription for carafate/sucralfate enemas to [facility] as below    2 g (tablets or suspension) dissolved in 20 mL water twice daily for 6 weeks retain each enema for as long as possible or at least 5 minutes    Thanks    Yari Randle MD  [address] - Gastroenterology  1/24/2023  3:11 PM

## 2023-01-25 NOTE — TELEPHONE ENCOUNTER
[address] [phone]    I spoke to the Pharmacist, Dea Chacon PharmD and called in the Carafate enemas. #60 w/1 RF per Dr Madeleine Doherty orders below    I called the pt to let him know [address] will call him when they are ready.  Pt voices understanding

## 2023-02-02 ENCOUNTER — TELEPHONE (OUTPATIENT)
Dept: RADIATION ONCOLOGY | Facility: HOSPITAL | Age: 73
End: 2023-02-02

## 2023-02-03 ENCOUNTER — TELEPHONE (OUTPATIENT)
Dept: HEMATOLOGY/ONCOLOGY | Facility: HOSPITAL | Age: 73
End: 2023-02-03

## 2023-02-03 NOTE — TELEPHONE ENCOUNTER
Spoke with patient re his abiraterone:  A new prior auth had to be submitted for his scrip. It has been done and approved. The specialty pharmacy who has the scrip, has the funding approval and will be calling to set up delivery is [id number] (no longer [facility]). They should be calling to set up delivery in the next couple of days. Please be alert for their call. There should be low or no copay. He confirms understanding.

## 2023-02-20 ENCOUNTER — TELEPHONE (OUTPATIENT)
Dept: HEMATOLOGY/ONCOLOGY | Facility: HOSPITAL | Age: 73
End: 2023-02-20

## 2023-02-20 NOTE — TELEPHONE ENCOUNTER
Prescription for abiraterone with [id number]. With [employer] assistance and insurance copay comes to $722/month. Confirmed this with [employer]. Patient unable to afford. Foundations currently closed. [id number] (spoke with Tana peña) confirms they will keep scrip active and will provide daily assessment of foundations when open and will assist with applying on behalf of patient. Above explained to patient. He is declining at this point to pay the $722 and will await foundation approval.  Has 3 final days of med. F/u in March with psa, lab check. He confirms understanding and is appreciative of assistance.

## 2023-03-10 DIAGNOSIS — C61 PROSTATE CANCER (HCC): Primary | ICD-10-CM

## 2023-03-10 DIAGNOSIS — D50.0 IRON DEFICIENCY ANEMIA DUE TO CHRONIC BLOOD LOSS: ICD-10-CM

## 2023-03-10 DIAGNOSIS — C19 RECTOSIGMOID CANCER (HCC): ICD-10-CM

## 2023-03-14 ENCOUNTER — TELEPHONE (OUTPATIENT)
Facility: CLINIC | Age: 73
End: 2023-03-14

## 2023-03-14 ENCOUNTER — OFFICE VISIT (OUTPATIENT)
Dept: HEMATOLOGY/ONCOLOGY | Facility: HOSPITAL | Age: 73
End: 2023-03-14
Attending: INTERNAL MEDICINE
Payer: MEDICARE

## 2023-03-14 VITALS
BODY MASS INDEX: 24.25 KG/M2 | HEART RATE: 96 BPM | TEMPERATURE: 98 F | WEIGHT: 160 LBS | HEIGHT: 68 IN | RESPIRATION RATE: 18 BRPM | SYSTOLIC BLOOD PRESSURE: 164 MMHG | DIASTOLIC BLOOD PRESSURE: 83 MMHG | OXYGEN SATURATION: 98 %

## 2023-03-14 DIAGNOSIS — Z51.11 CHEMOTHERAPY MANAGEMENT, ENCOUNTER FOR: ICD-10-CM

## 2023-03-14 DIAGNOSIS — Z79.818 ANDROGEN DEPRIVATION THERAPY: ICD-10-CM

## 2023-03-14 DIAGNOSIS — E61.1 IRON DEFICIENCY: ICD-10-CM

## 2023-03-14 DIAGNOSIS — C19 RECTOSIGMOID CANCER (HCC): ICD-10-CM

## 2023-03-14 DIAGNOSIS — C61 PROSTATE CANCER (HCC): ICD-10-CM

## 2023-03-14 DIAGNOSIS — D50.0 IRON DEFICIENCY ANEMIA DUE TO CHRONIC BLOOD LOSS: Primary | ICD-10-CM

## 2023-03-14 DIAGNOSIS — C61 PROSTATE CANCER (HCC): Primary | ICD-10-CM

## 2023-03-14 LAB
ALBUMIN SERPL-MCNC: 4.3 G/DL (ref 3.4–5)
ALBUMIN/GLOB SERPL: 1 {RATIO} (ref 1–2)
ALP LIVER SERPL-CCNC: 50 U/L
ALT SERPL-CCNC: 64 U/L
ANION GAP SERPL CALC-SCNC: 10 MMOL/L (ref 0–18)
AST SERPL-CCNC: 53 U/L (ref 15–37)
BASOPHILS # BLD AUTO: 0.03 X10(3) UL (ref 0–0.2)
BASOPHILS NFR BLD AUTO: 0.5 %
BILIRUB SERPL-MCNC: 0.5 MG/DL (ref 0.1–2)
BUN BLD-MCNC: 10 MG/DL (ref 7–18)
BUN/CREAT SERPL: 11.5 (ref 10–20)
CALCIUM BLD-MCNC: 9.4 MG/DL (ref 8.5–10.1)
CEA SERPL-MCNC: 1.3 NG/ML (ref ?–5)
CHLORIDE SERPL-SCNC: 100 MMOL/L (ref 98–112)
CO2 SERPL-SCNC: 26 MMOL/L (ref 21–32)
CREAT BLD-MCNC: 0.87 MG/DL
DEPRECATED HBV CORE AB SER IA-ACNC: 60.9 NG/ML
DEPRECATED RDW RBC AUTO: 49.9 FL (ref 35.1–46.3)
EOSINOPHIL # BLD AUTO: 0.03 X10(3) UL (ref 0–0.7)
EOSINOPHIL NFR BLD AUTO: 0.5 %
ERYTHROCYTE [DISTWIDTH] IN BLOOD BY AUTOMATED COUNT: 14.9 % (ref 11–15)
GFR SERPLBLD BASED ON 1.73 SQ M-ARVRAT: 92 ML/MIN/1.73M2 (ref 60–?)
GLOBULIN PLAS-MCNC: 4.4 G/DL (ref 2.8–4.4)
GLUCOSE BLD-MCNC: 136 MG/DL (ref 70–99)
HCT VFR BLD AUTO: 37.2 %
HGB BLD-MCNC: 12.8 G/DL
IMM GRANULOCYTES # BLD AUTO: 0.03 X10(3) UL (ref 0–1)
IMM GRANULOCYTES NFR BLD: 0.5 %
IRON SATN MFR SERPL: 18 %
IRON SERPL-MCNC: 67 UG/DL
LYMPHOCYTES # BLD AUTO: 0.58 X10(3) UL (ref 1–4)
LYMPHOCYTES NFR BLD AUTO: 9.4 %
MCH RBC QN AUTO: 31.3 PG (ref 26–34)
MCHC RBC AUTO-ENTMCNC: 34.4 G/DL (ref 31–37)
MCV RBC AUTO: 91 FL
MONOCYTES # BLD AUTO: 0.42 X10(3) UL (ref 0.1–1)
MONOCYTES NFR BLD AUTO: 6.8 %
NEUTROPHILS # BLD AUTO: 5.08 X10 (3) UL (ref 1.5–7.7)
NEUTROPHILS # BLD AUTO: 5.08 X10(3) UL (ref 1.5–7.7)
NEUTROPHILS NFR BLD AUTO: 82.3 %
OSMOLALITY SERPL CALC.SUM OF ELEC: 283 MOSM/KG (ref 275–295)
PLATELET # BLD AUTO: 181 10(3)UL (ref 150–450)
POTASSIUM SERPL-SCNC: 3.7 MMOL/L (ref 3.5–5.1)
PROT SERPL-MCNC: 8.7 G/DL (ref 6.4–8.2)
PSA SERPL-MCNC: <0.01 NG/ML (ref ?–4)
RBC # BLD AUTO: 4.09 X10(6)UL
SODIUM SERPL-SCNC: 136 MMOL/L (ref 136–145)
TESTOST SERPL-MCNC: <7 NG/DL
TIBC SERPL-MCNC: 370 UG/DL (ref 240–450)
TRANSFERRIN SERPL-MCNC: 248 MG/DL (ref 200–360)
WBC # BLD AUTO: 6.2 X10(3) UL (ref 4–11)

## 2023-03-14 PROCEDURE — 84403 ASSAY OF TOTAL TESTOSTERONE: CPT

## 2023-03-14 PROCEDURE — 83540 ASSAY OF IRON: CPT

## 2023-03-14 PROCEDURE — 82728 ASSAY OF FERRITIN: CPT

## 2023-03-14 PROCEDURE — 99215 OFFICE O/P EST HI 40 MIN: CPT | Performed by: INTERNAL MEDICINE

## 2023-03-14 PROCEDURE — 84466 ASSAY OF TRANSFERRIN: CPT

## 2023-03-14 PROCEDURE — 82378 CARCINOEMBRYONIC ANTIGEN: CPT

## 2023-03-14 PROCEDURE — 36415 COLL VENOUS BLD VENIPUNCTURE: CPT

## 2023-03-14 PROCEDURE — 85025 COMPLETE CBC W/AUTO DIFF WBC: CPT

## 2023-03-14 PROCEDURE — 96402 CHEMO HORMON ANTINEOPL SQ/IM: CPT

## 2023-03-14 PROCEDURE — 84153 ASSAY OF PSA TOTAL: CPT

## 2023-03-14 PROCEDURE — 80053 COMPREHEN METABOLIC PANEL: CPT

## 2023-03-14 NOTE — TELEPHONE ENCOUNTER
1st reminder letter sent to patient              [address] (LWX=[id number]) ([id number]) [[id number]] (Order [id number])
no

## 2023-03-14 NOTE — PROGRESS NOTES
Patient's deductible / Tanisha Bigger for one month of abiraterone through [employer]. with insurance (best cost) is over $700. I advised him that currently there are no foundations open for copay assistance for prostate/abiraterone. After speaking with Dr Mikaela Power, pt decides to pay the out of pocket cost for the medicine with the understanding that he will complete his treatment October 18 2023 (total of 2 years of abiraterone)   His copay will likely reduce after his out of pocket is met according to him so the following month may be a better rate. He is will to continue for the remaining 6 months. We discussed [facility], however, he does not wish to delay any longer, has been off med for 3 weeks. He will arrange for delivery on Friday. Assisted w/ Ct scan due in June. Appreciative of assistance.

## 2023-03-24 ENCOUNTER — OFFICE VISIT (OUTPATIENT)
Dept: RADIATION ONCOLOGY | Facility: HOSPITAL | Age: 73
End: 2023-03-24
Attending: RADIOLOGY
Payer: MEDICARE

## 2023-03-24 VITALS
RESPIRATION RATE: 18 BRPM | HEART RATE: 99 BPM | WEIGHT: 159.81 LBS | OXYGEN SATURATION: 98 % | TEMPERATURE: 98 F | SYSTOLIC BLOOD PRESSURE: 157 MMHG | BODY MASS INDEX: 24 KG/M2 | DIASTOLIC BLOOD PRESSURE: 83 MMHG

## 2023-03-24 DIAGNOSIS — C61 PROSTATE CANCER (HCC): Primary | ICD-10-CM

## 2023-03-24 PROCEDURE — 99211 OFF/OP EST MAY X REQ PHY/QHP: CPT

## 2023-03-24 NOTE — PATIENT INSTRUCTIONS
Follow up with Dr. Holly Frazier as needed. Please call [phone] with any radiation questions. Continue to follow closely with Dr. Cassandra Hawk. Follow as needed with Dr. Kaylin Aguirre.

## 2023-03-25 NOTE — PROGRESS NOTES
[facility]    PATIENT'S NAME: Tigre Hou   RADIATION ONCOLOGIST: Chuy Ruelas. Claude Romero MD   PATIENT ACCOUNT #: [de-identified] LOCATION: [facility] RECORD #: [id number] YOB: 1950   FOLLOW-UP DATE: 03/24/2023       RADIATION ONCOLOGY FOLLOW-UP NOTE    REFERRING PHYSICIAN:  Ольга Soria MD.    DIAGNOSIS:    1. Adenocarcinoma of the prostate, grade group 5, PSA 10.4, high risk. 2.   Adenocarcinoma of the lower sigmoid/upper rectum, pathologic T2N0.    REGION TREATED:  Prostate, 7000 cGy, completed 11/15/2021. INTERVAL SINCE COMPLETION OF RADIATION THERAPY:  One year and 4 months. PATIENT STATUS:  Clinically and biochemically KEO, maintained on ADT with abiraterone. HISTORY:  The patient is a 71-year-old male who presents today for a routine followup visit. He has a history of a high-risk prostate cancer diagnosed back in May 2021. At that time, the PSA was 10.4, and a biopsy showed grade group 5 disease in the left apex. Nine of 13 cores were involved, and there was no evidence of any metastatic disease. However, on workup, he was found to have significant anemia and a GI bleed workup was undertaken. Ultimately, he was found to have a synchronous rectosigmoid mass consistent with adenocarcinoma. He underwent a robotic-assisted low anterior resection on 07/02/2021, and the pathology showed a T2N0 tumor. He had been maintained on ADT during this time. He needed no adjuvant therapy for the colorectal cancer, and then, after recovery, he presented to Radiation for treatment. I then treated him with definitive radiation to 7000 cGy in 250 cGy fractions in conjunction with ADT alongside abiraterone. He completed those treatments on 11/15/2021. On his visit today, the patient is doing rather well. 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   He was given recommendation to try sucralfate enemas but never attempted these. His symptoms have improved on their own of late. He continues to have bowel irregularity, which he has had ever since surgery. His urinary functioning is stable with an AUA score of 5/35. His most recent PSA is undetectable on 03/14/2021. On that date, he got his most recent Lupron injection as well. He continues on both Lupron and abiraterone as recommended by Dr. Dajuan Espinosa. PHYSICAL EXAMINATION:    VITAL SIGNS:  On exam today, the patient is noted to have a blood pressure of 157/83, pulse 99, respiratory rate of 18, and temperature 97.7. He has a pain score of 0 and a weight of 159 pounds. NECK:  Supple with no lymphadenopathy. LUNGS:  Clear to auscultation bilaterally. HEART:  Regular rate and rhythm. Normal S1, S2. No audible murmurs. LYMPHATICS:  There is no supraclavicular, axillary, inguinal lymphadenopathy. ABDOMEN:  Soft, nontender, nondistended with normoactive bowel sounds and no hepatosplenomegaly. EXTREMITIES:  Without clubbing, cyanosis, or edema. NEUROLOGIC:  Cranial nerves II through XII are grossly intact. There are no focal deficits. IMPRESSION AND RECOMMENDATIONS:  Overall, the patient is doing reasonably well at this time. He continues to be disease-free from a biochemical standpoint despite having high-risk disease and being treated with ADT alongside abiraterone. He appears to be tolerating these medications reasonably well. His urinary functioning is good and his bowels are stable since surgery. He does have some periodic bleeding from his rectum from some radiation proctitis, but this has been improving of late and is not terribly frequent or common. The patient feels that he does not wish to try the sucralfate enemas at this time as he thinks the problem is resolving on its own and it really does not cause him any other distress.     The patient continues to follow closely with Dr. Dajuan Espinosa who will be obtaining imaging again in the near future. In light of the above, I told him that he really does not need to continue to see me for routine visits. 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 I told him that if he should ever become problematic again, he should contact my office and we can discuss other potential strategies and treatments. Otherwise, he does need routine colonoscopy periodically to follow up on his rectosigmoid cancer in addition to his prostate followup with Dr. Mary Qureshi. Thank you very much for allowing me the opportunity to participate in the care of this patient. If there should be any questions regarding the radiotherapy, please feel free to contact me at any time. Dictated By Holly Wray MD  d: 03/24/2023 16:34:57  t: 03/25/2023 06:35:14  Job [id number]  NAD/    cc: MD Ariana Samano MD Winthrop Der, MD Orlando Knapp, MD Dr. Leana Hanger

## 2023-05-05 ENCOUNTER — TELEPHONE (OUTPATIENT)
Dept: INTERNAL MEDICINE CLINIC | Facility: CLINIC | Age: 73
End: 2023-05-05

## 2023-05-05 DIAGNOSIS — R05.3 CHRONIC COUGH: ICD-10-CM

## 2023-05-05 DIAGNOSIS — K21.9 GASTROESOPHAGEAL REFLUX DISEASE WITHOUT ESOPHAGITIS: ICD-10-CM

## 2023-05-05 RX ORDER — PANTOPRAZOLE SODIUM 20 MG/1
20 TABLET, DELAYED RELEASE ORAL DAILY
Qty: 90 TABLET | Refills: 0 | Status: SHIPPED | OUTPATIENT
Start: 2023-05-05

## 2023-05-05 NOTE — TELEPHONE ENCOUNTER
Pt is calling requesting refill on pantoprazole 20 mg. Pt states that called pharmacy and was they were going to request it. Pt mention he does not have any left.       Please call and advise

## 2023-05-15 ENCOUNTER — OFFICE VISIT (OUTPATIENT)
Dept: INTERNAL MEDICINE CLINIC | Facility: CLINIC | Age: 73
End: 2023-05-15
Payer: MEDICARE

## 2023-05-15 VITALS
TEMPERATURE: 98 F | DIASTOLIC BLOOD PRESSURE: 70 MMHG | SYSTOLIC BLOOD PRESSURE: 118 MMHG | BODY MASS INDEX: 25.89 KG/M2 | HEIGHT: 66.34 IN | HEART RATE: 79 BPM | OXYGEN SATURATION: 94 % | WEIGHT: 163 LBS

## 2023-05-15 DIAGNOSIS — D50.0 IRON DEFICIENCY ANEMIA DUE TO CHRONIC BLOOD LOSS: ICD-10-CM

## 2023-05-15 DIAGNOSIS — C61 PROSTATE CANCER (HCC): ICD-10-CM

## 2023-05-15 DIAGNOSIS — R05.3 CHRONIC COUGH: ICD-10-CM

## 2023-05-15 DIAGNOSIS — Z85.048 HISTORY OF CANCER OF RECTOSIGMOID JUNCTION: ICD-10-CM

## 2023-05-15 DIAGNOSIS — I10 ESSENTIAL HYPERTENSION: ICD-10-CM

## 2023-05-15 DIAGNOSIS — G89.29 CHRONIC PAIN OF LEFT KNEE: ICD-10-CM

## 2023-05-15 DIAGNOSIS — M25.562 CHRONIC PAIN OF LEFT KNEE: ICD-10-CM

## 2023-05-15 DIAGNOSIS — K21.9 GASTROESOPHAGEAL REFLUX DISEASE WITHOUT ESOPHAGITIS: ICD-10-CM

## 2023-05-15 DIAGNOSIS — Z00.00 ENCOUNTER FOR ANNUAL HEALTH EXAMINATION: Primary | ICD-10-CM

## 2023-05-15 DIAGNOSIS — K74.00 LIVER FIBROSIS: ICD-10-CM

## 2023-05-15 PROCEDURE — 1125F AMNT PAIN NOTED PAIN PRSNT: CPT | Performed by: FAMILY MEDICINE

## 2023-05-15 PROCEDURE — G0439 PPPS, SUBSEQ VISIT: HCPCS | Performed by: FAMILY MEDICINE

## 2023-05-15 PROCEDURE — 99213 OFFICE O/P EST LOW 20 MIN: CPT | Performed by: FAMILY MEDICINE

## 2023-05-15 RX ORDER — AMLODIPINE AND VALSARTAN 10; 320 MG/1; MG/1
1 TABLET ORAL DAILY
Qty: 90 TABLET | Refills: 1 | Status: SHIPPED | OUTPATIENT
Start: 2023-05-15

## 2023-06-12 ENCOUNTER — HOSPITAL ENCOUNTER (OUTPATIENT)
Dept: GENERAL RADIOLOGY | Facility: HOSPITAL | Age: 73
Discharge: HOME | End: 2023-06-12
Attending: FAMILY MEDICINE
Payer: MEDICARE

## 2023-06-12 ENCOUNTER — HOSPITAL ENCOUNTER (OUTPATIENT)
Dept: CT IMAGING | Facility: HOSPITAL | Age: 73
Discharge: HOME | End: 2023-06-12
Attending: INTERNAL MEDICINE
Payer: MEDICARE

## 2023-06-12 DIAGNOSIS — G89.29 CHRONIC PAIN OF LEFT KNEE: ICD-10-CM

## 2023-06-12 DIAGNOSIS — C19 RECTOSIGMOID CANCER (HCC): ICD-10-CM

## 2023-06-12 DIAGNOSIS — M25.562 CHRONIC PAIN OF LEFT KNEE: ICD-10-CM

## 2023-06-12 DIAGNOSIS — C61 PROSTATE CANCER (HCC): ICD-10-CM

## 2023-06-12 LAB
CREAT BLD-MCNC: 0.8 MG/DL
GFR SERPLBLD BASED ON 1.73 SQ M-ARVRAT: 95 ML/MIN/1.73M2 (ref 60–?)

## 2023-06-12 PROCEDURE — 71260 CT THORAX DX C+: CPT | Performed by: INTERNAL MEDICINE

## 2023-06-12 PROCEDURE — 74177 CT ABD & PELVIS W/CONTRAST: CPT | Performed by: INTERNAL MEDICINE

## 2023-06-12 PROCEDURE — 73564 X-RAY EXAM KNEE 4 OR MORE: CPT | Performed by: FAMILY MEDICINE

## 2023-06-12 PROCEDURE — 82565 ASSAY OF CREATININE: CPT

## 2023-06-13 ENCOUNTER — OFFICE VISIT (OUTPATIENT)
Dept: HEMATOLOGY/ONCOLOGY | Facility: HOSPITAL | Age: 73
End: 2023-06-13
Attending: INTERNAL MEDICINE
Payer: MEDICARE

## 2023-06-13 VITALS
WEIGHT: 159 LBS | RESPIRATION RATE: 18 BRPM | TEMPERATURE: 98 F | HEART RATE: 79 BPM | SYSTOLIC BLOOD PRESSURE: 129 MMHG | OXYGEN SATURATION: 94 % | HEIGHT: 68 IN | BODY MASS INDEX: 24.1 KG/M2 | DIASTOLIC BLOOD PRESSURE: 62 MMHG

## 2023-06-13 DIAGNOSIS — C19 RECTOSIGMOID CANCER (HCC): ICD-10-CM

## 2023-06-13 DIAGNOSIS — D50.0 IRON DEFICIENCY ANEMIA DUE TO CHRONIC BLOOD LOSS: ICD-10-CM

## 2023-06-13 DIAGNOSIS — Z79.818 ANDROGEN DEPRIVATION THERAPY: ICD-10-CM

## 2023-06-13 DIAGNOSIS — C61 PROSTATE CANCER (HCC): ICD-10-CM

## 2023-06-13 DIAGNOSIS — Z51.11 CHEMOTHERAPY MANAGEMENT, ENCOUNTER FOR: ICD-10-CM

## 2023-06-13 DIAGNOSIS — D50.0 IRON DEFICIENCY ANEMIA DUE TO CHRONIC BLOOD LOSS: Primary | ICD-10-CM

## 2023-06-13 DIAGNOSIS — C61 PROSTATE CANCER (HCC): Primary | ICD-10-CM

## 2023-06-13 DIAGNOSIS — E61.1 IRON DEFICIENCY: ICD-10-CM

## 2023-06-13 LAB
ALBUMIN SERPL-MCNC: 3.4 G/DL (ref 3.4–5)
ALBUMIN/GLOB SERPL: 0.8 {RATIO} (ref 1–2)
ALP LIVER SERPL-CCNC: 51 U/L
ALT SERPL-CCNC: 37 U/L
ANION GAP SERPL CALC-SCNC: 11 MMOL/L (ref 0–18)
AST SERPL-CCNC: 42 U/L (ref 15–37)
BASOPHILS # BLD AUTO: 0.03 X10(3) UL (ref 0–0.2)
BASOPHILS NFR BLD AUTO: 0.8 %
BILIRUB SERPL-MCNC: 0.5 MG/DL (ref 0.1–2)
BUN BLD-MCNC: 10 MG/DL (ref 7–18)
BUN/CREAT SERPL: 12.3 (ref 10–20)
CALCIUM BLD-MCNC: 8.6 MG/DL (ref 8.5–10.1)
CEA SERPL-MCNC: 2 NG/ML (ref ?–5)
CHLORIDE SERPL-SCNC: 99 MMOL/L (ref 98–112)
CO2 SERPL-SCNC: 25 MMOL/L (ref 21–32)
CREAT BLD-MCNC: 0.81 MG/DL
DEPRECATED HBV CORE AB SER IA-ACNC: 57.8 NG/ML
DEPRECATED RDW RBC AUTO: 44.7 FL (ref 35.1–46.3)
EOSINOPHIL # BLD AUTO: 0.03 X10(3) UL (ref 0–0.7)
EOSINOPHIL NFR BLD AUTO: 0.8 %
ERYTHROCYTE [DISTWIDTH] IN BLOOD BY AUTOMATED COUNT: 13.2 % (ref 11–15)
GFR SERPLBLD BASED ON 1.73 SQ M-ARVRAT: 94 ML/MIN/1.73M2 (ref 60–?)
GLOBULIN PLAS-MCNC: 4.5 G/DL (ref 2.8–4.4)
GLUCOSE BLD-MCNC: 180 MG/DL (ref 70–99)
HCT VFR BLD AUTO: 32.3 %
HGB BLD-MCNC: 11.2 G/DL
IMM GRANULOCYTES # BLD AUTO: 0.02 X10(3) UL (ref 0–1)
IMM GRANULOCYTES NFR BLD: 0.5 %
IRON SATN MFR SERPL: 13 %
IRON SERPL-MCNC: 45 UG/DL
LYMPHOCYTES # BLD AUTO: 0.48 X10(3) UL (ref 1–4)
LYMPHOCYTES NFR BLD AUTO: 12.7 %
MCH RBC QN AUTO: 31.5 PG (ref 26–34)
MCHC RBC AUTO-ENTMCNC: 34.7 G/DL (ref 31–37)
MCV RBC AUTO: 91 FL
MONOCYTES # BLD AUTO: 0.29 X10(3) UL (ref 0.1–1)
MONOCYTES NFR BLD AUTO: 7.7 %
NEUTROPHILS # BLD AUTO: 2.92 X10 (3) UL (ref 1.5–7.7)
NEUTROPHILS # BLD AUTO: 2.92 X10(3) UL (ref 1.5–7.7)
NEUTROPHILS NFR BLD AUTO: 77.5 %
OSMOLALITY SERPL CALC.SUM OF ELEC: 284 MOSM/KG (ref 275–295)
PLATELET # BLD AUTO: 166 10(3)UL (ref 150–450)
POTASSIUM SERPL-SCNC: 3.3 MMOL/L (ref 3.5–5.1)
PROT SERPL-MCNC: 7.9 G/DL (ref 6.4–8.2)
PSA SERPL-MCNC: <0.01 NG/ML (ref ?–4)
RBC # BLD AUTO: 3.55 X10(6)UL
SODIUM SERPL-SCNC: 135 MMOL/L (ref 136–145)
TESTOST SERPL-MCNC: <7 NG/DL
TIBC SERPL-MCNC: 340 UG/DL (ref 240–450)
TRANSFERRIN SERPL-MCNC: 228 MG/DL (ref 200–360)
WBC # BLD AUTO: 3.8 X10(3) UL (ref 4–11)

## 2023-06-13 PROCEDURE — 36415 COLL VENOUS BLD VENIPUNCTURE: CPT

## 2023-06-13 PROCEDURE — 84403 ASSAY OF TOTAL TESTOSTERONE: CPT

## 2023-06-13 PROCEDURE — 82728 ASSAY OF FERRITIN: CPT

## 2023-06-13 PROCEDURE — 80053 COMPREHEN METABOLIC PANEL: CPT

## 2023-06-13 PROCEDURE — 83540 ASSAY OF IRON: CPT

## 2023-06-13 PROCEDURE — 84153 ASSAY OF PSA TOTAL: CPT

## 2023-06-13 PROCEDURE — 96402 CHEMO HORMON ANTINEOPL SQ/IM: CPT

## 2023-06-13 PROCEDURE — 82378 CARCINOEMBRYONIC ANTIGEN: CPT

## 2023-06-13 PROCEDURE — 85025 COMPLETE CBC W/AUTO DIFF WBC: CPT

## 2023-06-13 PROCEDURE — 84466 ASSAY OF TRANSFERRIN: CPT

## 2023-06-13 PROCEDURE — 99215 OFFICE O/P EST HI 40 MIN: CPT | Performed by: INTERNAL MEDICINE

## 2023-06-15 ENCOUNTER — TELEPHONE (OUTPATIENT)
Facility: CLINIC | Age: 73
End: 2023-06-15

## 2023-06-15 NOTE — TELEPHONE ENCOUNTER
3rd, overdue reminder letter sent to patient via  Mail and my chart    US LIVER (CPT=76705) (Order #[id number]) on 1/24/23

## 2023-06-20 ENCOUNTER — TELEPHONE (OUTPATIENT)
Dept: HEMATOLOGY/ONCOLOGY | Facility: HOSPITAL | Age: 73
End: 2023-06-20

## 2023-06-20 NOTE — TELEPHONE ENCOUNTER
Called patients daughter to schedule iron infusion ordered by dr Robertson Led.   Patients daughter did not answer did leave a message on voicemail

## 2023-06-29 ENCOUNTER — TELEPHONE (OUTPATIENT)
Dept: INTERNAL MEDICINE CLINIC | Facility: CLINIC | Age: 73
End: 2023-06-29

## 2023-06-29 DIAGNOSIS — M17.10 ARTHRITIS OF KNEE: Primary | ICD-10-CM

## 2023-06-30 PROBLEM — N06.9 ISOLATED PROTEINURIA WITH MORPHOLOGIC LESION: Status: RESOLVED | Noted: 2019-01-21 | Resolved: 2023-06-30

## 2023-07-03 PROBLEM — Z85.048: Status: ACTIVE | Noted: 2021-07-02

## 2023-07-11 ENCOUNTER — OFFICE VISIT (OUTPATIENT)
Dept: HEMATOLOGY/ONCOLOGY | Facility: HOSPITAL | Age: 73
End: 2023-07-11
Attending: INTERNAL MEDICINE
Payer: MEDICARE

## 2023-07-11 VITALS
TEMPERATURE: 98 F | OXYGEN SATURATION: 96 % | SYSTOLIC BLOOD PRESSURE: 136 MMHG | HEART RATE: 83 BPM | RESPIRATION RATE: 18 BRPM | DIASTOLIC BLOOD PRESSURE: 70 MMHG

## 2023-07-11 DIAGNOSIS — C61 PROSTATE CANCER (HCC): ICD-10-CM

## 2023-07-11 DIAGNOSIS — E61.1 IRON DEFICIENCY: ICD-10-CM

## 2023-07-11 DIAGNOSIS — D50.0 IRON DEFICIENCY ANEMIA DUE TO CHRONIC BLOOD LOSS: Primary | ICD-10-CM

## 2023-07-11 PROCEDURE — 96374 THER/PROPH/DIAG INJ IV PUSH: CPT

## 2023-07-11 NOTE — PROGRESS NOTES
Pt arrived for iron infusion. PIV placed and procedure explained to pt. Pt reports tolerating iron infusions in the past.  Feraheme given as ordered. Observed for 30 minutes post infusion. Appeared to tolerate treatment, no s/s of rxn noted. Discharged home ambulating independently.

## 2023-07-17 ENCOUNTER — OFFICE VISIT (OUTPATIENT)
Dept: ORTHOPEDICS CLINIC | Facility: CLINIC | Age: 73
End: 2023-07-17

## 2023-07-17 VITALS — WEIGHT: 156.81 LBS | HEIGHT: 68 IN | BODY MASS INDEX: 23.77 KG/M2

## 2023-07-17 DIAGNOSIS — M17.12 PRIMARY OSTEOARTHRITIS OF LEFT KNEE: Primary | ICD-10-CM

## 2023-08-02 DIAGNOSIS — K21.9 GASTROESOPHAGEAL REFLUX DISEASE WITHOUT ESOPHAGITIS: ICD-10-CM

## 2023-08-02 DIAGNOSIS — R05.3 CHRONIC COUGH: ICD-10-CM

## 2023-08-02 RX ORDER — PANTOPRAZOLE SODIUM 20 MG/1
20 TABLET, DELAYED RELEASE ORAL DAILY
Qty: 90 TABLET | Refills: 0 | Status: SHIPPED | OUTPATIENT
Start: 2023-08-02

## 2023-09-11 DIAGNOSIS — D50.0 IRON DEFICIENCY ANEMIA DUE TO CHRONIC BLOOD LOSS: Primary | ICD-10-CM

## 2023-09-11 DIAGNOSIS — C61 PROSTATE CANCER (HCC): ICD-10-CM

## 2023-09-12 ENCOUNTER — OFFICE VISIT (OUTPATIENT)
Dept: HEMATOLOGY/ONCOLOGY | Facility: HOSPITAL | Age: 73
End: 2023-09-12
Attending: INTERNAL MEDICINE
Payer: MEDICARE

## 2023-09-12 VITALS
HEART RATE: 97 BPM | BODY MASS INDEX: 23.19 KG/M2 | DIASTOLIC BLOOD PRESSURE: 73 MMHG | RESPIRATION RATE: 18 BRPM | HEIGHT: 68 IN | WEIGHT: 153 LBS | TEMPERATURE: 98 F | OXYGEN SATURATION: 97 % | SYSTOLIC BLOOD PRESSURE: 132 MMHG

## 2023-09-12 DIAGNOSIS — E61.1 IRON DEFICIENCY: ICD-10-CM

## 2023-09-12 DIAGNOSIS — C19 RECTOSIGMOID CANCER (HCC): ICD-10-CM

## 2023-09-12 DIAGNOSIS — D50.0 IRON DEFICIENCY ANEMIA DUE TO CHRONIC BLOOD LOSS: Primary | ICD-10-CM

## 2023-09-12 DIAGNOSIS — Z51.11 CHEMOTHERAPY MANAGEMENT, ENCOUNTER FOR: ICD-10-CM

## 2023-09-12 DIAGNOSIS — C61 PROSTATE CANCER (HCC): ICD-10-CM

## 2023-09-12 DIAGNOSIS — C61 PROSTATE CANCER (HCC): Primary | ICD-10-CM

## 2023-09-12 LAB
ALBUMIN SERPL-MCNC: 4 G/DL (ref 3.4–5)
ALBUMIN/GLOB SERPL: 0.9 {RATIO} (ref 1–2)
ALP LIVER SERPL-CCNC: 32 U/L
ALT SERPL-CCNC: 60 U/L
ANION GAP SERPL CALC-SCNC: 10 MMOL/L (ref 0–18)
AST SERPL-CCNC: 48 U/L (ref 15–37)
BASOPHILS # BLD AUTO: 0.02 X10(3) UL (ref 0–0.2)
BASOPHILS NFR BLD AUTO: 0.4 %
BILIRUB SERPL-MCNC: 0.8 MG/DL (ref 0.1–2)
BUN BLD-MCNC: 11 MG/DL (ref 7–18)
BUN/CREAT SERPL: 10.4 (ref 10–20)
CALCIUM BLD-MCNC: 9 MG/DL (ref 8.5–10.1)
CHLORIDE SERPL-SCNC: 104 MMOL/L (ref 98–112)
CO2 SERPL-SCNC: 21 MMOL/L (ref 21–32)
CREAT BLD-MCNC: 1.06 MG/DL
DEPRECATED HBV CORE AB SER IA-ACNC: 164.6 NG/ML
DEPRECATED RDW RBC AUTO: 50.5 FL (ref 35.1–46.3)
EGFRCR SERPLBLD CKD-EPI 2021: 75 ML/MIN/1.73M2 (ref 60–?)
EOSINOPHIL # BLD AUTO: 0.05 X10(3) UL (ref 0–0.7)
EOSINOPHIL NFR BLD AUTO: 1 %
ERYTHROCYTE [DISTWIDTH] IN BLOOD BY AUTOMATED COUNT: 14.6 % (ref 11–15)
GLOBULIN PLAS-MCNC: 4.4 G/DL (ref 2.8–4.4)
GLUCOSE BLD-MCNC: 120 MG/DL (ref 70–99)
HCT VFR BLD AUTO: 34.3 %
HGB BLD-MCNC: 12.1 G/DL
IMM GRANULOCYTES # BLD AUTO: 0.02 X10(3) UL (ref 0–1)
IMM GRANULOCYTES NFR BLD: 0.4 %
IRON SATN MFR SERPL: 30 %
IRON SERPL-MCNC: 88 UG/DL
LYMPHOCYTES # BLD AUTO: 0.47 X10(3) UL (ref 1–4)
LYMPHOCYTES NFR BLD AUTO: 9.5 %
MCH RBC QN AUTO: 33.4 PG (ref 26–34)
MCHC RBC AUTO-ENTMCNC: 35.3 G/DL (ref 31–37)
MCV RBC AUTO: 94.8 FL
MONOCYTES # BLD AUTO: 0.46 X10(3) UL (ref 0.1–1)
MONOCYTES NFR BLD AUTO: 9.3 %
NEUTROPHILS # BLD AUTO: 3.93 X10 (3) UL (ref 1.5–7.7)
NEUTROPHILS # BLD AUTO: 3.93 X10(3) UL (ref 1.5–7.7)
NEUTROPHILS NFR BLD AUTO: 79.4 %
OSMOLALITY SERPL CALC.SUM OF ELEC: 281 MOSM/KG (ref 275–295)
PLATELET # BLD AUTO: 143 10(3)UL (ref 150–450)
POTASSIUM SERPL-SCNC: 3.8 MMOL/L (ref 3.5–5.1)
PROT SERPL-MCNC: 8.4 G/DL (ref 6.4–8.2)
PSA SERPL-MCNC: <0.01 NG/ML (ref ?–4)
RBC # BLD AUTO: 3.62 X10(6)UL
SODIUM SERPL-SCNC: 135 MMOL/L (ref 136–145)
TESTOST SERPL-MCNC: <7 NG/DL
TIBC SERPL-MCNC: 294 UG/DL (ref 240–450)
TRANSFERRIN SERPL-MCNC: 197 MG/DL (ref 200–360)
WBC # BLD AUTO: 5 X10(3) UL (ref 4–11)

## 2023-09-12 PROCEDURE — 99215 OFFICE O/P EST HI 40 MIN: CPT | Performed by: INTERNAL MEDICINE

## 2023-09-12 PROCEDURE — 85025 COMPLETE CBC W/AUTO DIFF WBC: CPT

## 2023-09-12 PROCEDURE — 84466 ASSAY OF TRANSFERRIN: CPT

## 2023-09-12 PROCEDURE — 80053 COMPREHEN METABOLIC PANEL: CPT

## 2023-09-12 PROCEDURE — 82728 ASSAY OF FERRITIN: CPT

## 2023-09-12 PROCEDURE — 84403 ASSAY OF TOTAL TESTOSTERONE: CPT

## 2023-09-12 PROCEDURE — 36415 COLL VENOUS BLD VENIPUNCTURE: CPT

## 2023-09-12 PROCEDURE — 84153 ASSAY OF PSA TOTAL: CPT

## 2023-09-12 PROCEDURE — 96402 CHEMO HORMON ANTINEOPL SQ/IM: CPT

## 2023-09-12 PROCEDURE — 83540 ASSAY OF IRON: CPT

## 2023-09-12 RX ORDER — PREDNISONE 2.5 MG/1
2.5 TABLET ORAL DAILY
Qty: 21 TABLET | Refills: 0 | Status: SHIPPED | OUTPATIENT
Start: 2023-09-12

## 2023-10-16 RX ORDER — PREDNISONE 2.5 MG/1
2.5 TABLET ORAL DAILY
Qty: 21 TABLET | Refills: 0 | OUTPATIENT
Start: 2023-10-16

## 2023-10-27 DIAGNOSIS — R05.3 CHRONIC COUGH: ICD-10-CM

## 2023-10-27 DIAGNOSIS — K21.9 GASTROESOPHAGEAL REFLUX DISEASE WITHOUT ESOPHAGITIS: ICD-10-CM

## 2023-10-27 RX ORDER — PANTOPRAZOLE SODIUM 20 MG/1
20 TABLET, DELAYED RELEASE ORAL DAILY
Qty: 90 TABLET | Refills: 0 | Status: SHIPPED | OUTPATIENT
Start: 2023-10-27

## 2023-11-15 ENCOUNTER — OFFICE VISIT (OUTPATIENT)
Dept: INTERNAL MEDICINE CLINIC | Facility: CLINIC | Age: 73
End: 2023-11-15
Payer: MEDICARE

## 2023-11-15 VITALS
HEIGHT: 66.34 IN | SYSTOLIC BLOOD PRESSURE: 118 MMHG | WEIGHT: 149 LBS | DIASTOLIC BLOOD PRESSURE: 76 MMHG | TEMPERATURE: 98 F | OXYGEN SATURATION: 98 % | HEART RATE: 97 BPM | BODY MASS INDEX: 23.67 KG/M2

## 2023-11-15 DIAGNOSIS — M17.0 PRIMARY OSTEOARTHRITIS OF BOTH KNEES: ICD-10-CM

## 2023-11-15 DIAGNOSIS — C19 RECTOSIGMOID CANCER (HCC): ICD-10-CM

## 2023-11-15 DIAGNOSIS — I10 ESSENTIAL HYPERTENSION: Primary | ICD-10-CM

## 2023-11-15 DIAGNOSIS — C61 PROSTATE CANCER (HCC): ICD-10-CM

## 2023-11-15 DIAGNOSIS — K74.00 LIVER FIBROSIS: ICD-10-CM

## 2023-11-15 PROCEDURE — 99214 OFFICE O/P EST MOD 30 MIN: CPT | Performed by: FAMILY MEDICINE

## 2023-11-15 RX ORDER — AMLODIPINE AND VALSARTAN 10; 320 MG/1; MG/1
1 TABLET ORAL DAILY
Qty: 90 TABLET | Refills: 1 | Status: SHIPPED | OUTPATIENT
Start: 2023-11-15

## 2023-12-11 DIAGNOSIS — E61.1 IRON DEFICIENCY: ICD-10-CM

## 2023-12-11 DIAGNOSIS — C61 PROSTATE CANCER (HCC): Primary | ICD-10-CM

## 2023-12-12 ENCOUNTER — OFFICE VISIT (OUTPATIENT)
Dept: HEMATOLOGY/ONCOLOGY | Facility: HOSPITAL | Age: 73
End: 2023-12-12
Attending: INTERNAL MEDICINE
Payer: MEDICARE

## 2023-12-12 VITALS
SYSTOLIC BLOOD PRESSURE: 107 MMHG | HEIGHT: 68 IN | OXYGEN SATURATION: 96 % | HEART RATE: 79 BPM | DIASTOLIC BLOOD PRESSURE: 61 MMHG | TEMPERATURE: 98 F | WEIGHT: 150 LBS | RESPIRATION RATE: 18 BRPM | BODY MASS INDEX: 22.73 KG/M2

## 2023-12-12 DIAGNOSIS — Z00.00 ENCOUNTER FOR ANNUAL HEALTH EXAMINATION: ICD-10-CM

## 2023-12-12 DIAGNOSIS — E61.1 IRON DEFICIENCY: ICD-10-CM

## 2023-12-12 DIAGNOSIS — C61 PROSTATE CANCER (HCC): ICD-10-CM

## 2023-12-12 DIAGNOSIS — C19 RECTOSIGMOID CANCER (HCC): ICD-10-CM

## 2023-12-12 DIAGNOSIS — C61 PROSTATE CANCER (HCC): Primary | ICD-10-CM

## 2023-12-12 LAB
ALBUMIN SERPL-MCNC: 4.2 G/DL (ref 3.2–4.8)
ALBUMIN/GLOB SERPL: 1.2 {RATIO} (ref 1–2)
ALP LIVER SERPL-CCNC: 57 U/L
ALT SERPL-CCNC: 72 U/L
ANION GAP SERPL CALC-SCNC: 8 MMOL/L (ref 0–18)
AST SERPL-CCNC: 76 U/L (ref ?–34)
BASOPHILS # BLD AUTO: 0.02 X10(3) UL (ref 0–0.2)
BASOPHILS NFR BLD AUTO: 0.5 %
BILIRUB SERPL-MCNC: 0.2 MG/DL (ref 0.2–1.1)
BUN BLD-MCNC: 9 MG/DL (ref 9–23)
BUN/CREAT SERPL: 6 (ref 10–20)
CALCIUM BLD-MCNC: 8.9 MG/DL (ref 8.7–10.4)
CHLORIDE SERPL-SCNC: 103 MMOL/L (ref 98–112)
CHOLEST SERPL-MCNC: 187 MG/DL (ref ?–200)
CO2 SERPL-SCNC: 22 MMOL/L (ref 21–32)
CREAT BLD-MCNC: 1.49 MG/DL
DEPRECATED HBV CORE AB SER IA-ACNC: 142.2 NG/ML
DEPRECATED RDW RBC AUTO: 49.1 FL (ref 35.1–46.3)
EGFRCR SERPLBLD CKD-EPI 2021: 50 ML/MIN/1.73M2 (ref 60–?)
EOSINOPHIL # BLD AUTO: 0.19 X10(3) UL (ref 0–0.7)
EOSINOPHIL NFR BLD AUTO: 5 %
ERYTHROCYTE [DISTWIDTH] IN BLOOD BY AUTOMATED COUNT: 13.8 % (ref 11–15)
FASTING PATIENT LIPID ANSWER: YES
FASTING STATUS PATIENT QL REPORTED: NO
GLOBULIN PLAS-MCNC: 3.4 G/DL (ref 2.8–4.4)
GLUCOSE BLD-MCNC: 103 MG/DL (ref 70–99)
HCT VFR BLD AUTO: 32.5 %
HDLC SERPL-MCNC: 66 MG/DL (ref 40–59)
HGB BLD-MCNC: 11.1 G/DL
IMM GRANULOCYTES # BLD AUTO: 0.03 X10(3) UL (ref 0–1)
IMM GRANULOCYTES NFR BLD: 0.8 %
IRON SATN MFR SERPL: 35 %
IRON SERPL-MCNC: 75 UG/DL
LDLC SERPL CALC-MCNC: 87 MG/DL (ref ?–100)
LYMPHOCYTES # BLD AUTO: 1.06 X10(3) UL (ref 1–4)
LYMPHOCYTES NFR BLD AUTO: 28.1 %
MCH RBC QN AUTO: 33.1 PG (ref 26–34)
MCHC RBC AUTO-ENTMCNC: 34.2 G/DL (ref 31–37)
MCV RBC AUTO: 97 FL
MONOCYTES # BLD AUTO: 0.43 X10(3) UL (ref 0.1–1)
MONOCYTES NFR BLD AUTO: 11.4 %
NEUTROPHILS # BLD AUTO: 2.04 X10 (3) UL (ref 1.5–7.7)
NEUTROPHILS # BLD AUTO: 2.04 X10(3) UL (ref 1.5–7.7)
NEUTROPHILS NFR BLD AUTO: 54.2 %
NONHDLC SERPL-MCNC: 121 MG/DL (ref ?–130)
OSMOLALITY SERPL CALC.SUM OF ELEC: 275 MOSM/KG (ref 275–295)
PLATELET # BLD AUTO: 163 10(3)UL (ref 150–450)
POTASSIUM SERPL-SCNC: 4 MMOL/L (ref 3.5–5.1)
PROT SERPL-MCNC: 7.6 G/DL (ref 5.7–8.2)
PSA SERPL-MCNC: <0.04 NG/ML (ref ?–4)
RBC # BLD AUTO: 3.35 X10(6)UL
SODIUM SERPL-SCNC: 133 MMOL/L (ref 136–145)
TESTOST SERPL-MCNC: 10.57 NG/DL
TIBC SERPL-MCNC: 216 UG/DL (ref 250–425)
TRANSFERRIN SERPL-MCNC: 145 MG/DL (ref 215–365)
TRIGL SERPL-MCNC: 202 MG/DL (ref 30–149)
VLDLC SERPL CALC-MCNC: 32 MG/DL (ref 0–30)
WBC # BLD AUTO: 3.8 X10(3) UL (ref 4–11)

## 2023-12-12 PROCEDURE — 82728 ASSAY OF FERRITIN: CPT

## 2023-12-12 PROCEDURE — 80053 COMPREHEN METABOLIC PANEL: CPT

## 2023-12-12 PROCEDURE — 84153 ASSAY OF PSA TOTAL: CPT

## 2023-12-12 PROCEDURE — 84403 ASSAY OF TOTAL TESTOSTERONE: CPT

## 2023-12-12 PROCEDURE — 36415 COLL VENOUS BLD VENIPUNCTURE: CPT

## 2023-12-12 PROCEDURE — 85025 COMPLETE CBC W/AUTO DIFF WBC: CPT

## 2023-12-12 PROCEDURE — 83540 ASSAY OF IRON: CPT

## 2023-12-12 PROCEDURE — 99214 OFFICE O/P EST MOD 30 MIN: CPT | Performed by: INTERNAL MEDICINE

## 2023-12-12 PROCEDURE — 80061 LIPID PANEL: CPT

## 2023-12-12 PROCEDURE — 84466 ASSAY OF TRANSFERRIN: CPT

## 2024-01-15 DIAGNOSIS — K21.9 GASTROESOPHAGEAL REFLUX DISEASE WITHOUT ESOPHAGITIS: ICD-10-CM

## 2024-01-15 DIAGNOSIS — R05.3 CHRONIC COUGH: ICD-10-CM

## 2024-01-15 RX ORDER — PANTOPRAZOLE SODIUM 20 MG/1
20 TABLET, DELAYED RELEASE ORAL DAILY
Qty: 90 TABLET | Refills: 0 | Status: SHIPPED | OUTPATIENT
Start: 2024-01-15

## 2024-01-15 NOTE — TELEPHONE ENCOUNTER
MEDICATION REFILL REQUEST:    Future Appointment: 05/15/2024    Last appointment: 11/15/2023    Medication requested:   Requested Prescriptions     Pending Prescriptions Disp Refills    PANTOPRAZOLE 20 MG Oral Tab EC [Pharmacy Med Name: PANTOPRAZOLE SOD DR 20 MG TAB] 90 tablet 0     Sig: TAKE 1 TABLET BY MOUTH EVERY DAY         Last refill date: 10/27/2023

## 2024-03-12 ENCOUNTER — NURSE ONLY (OUTPATIENT)
Dept: HEMATOLOGY/ONCOLOGY | Facility: HOSPITAL | Age: 74
End: 2024-03-12
Attending: INTERNAL MEDICINE
Payer: MEDICARE

## 2024-03-12 ENCOUNTER — OFFICE VISIT (OUTPATIENT)
Dept: HEMATOLOGY/ONCOLOGY | Facility: HOSPITAL | Age: 74
End: 2024-03-12
Attending: INTERNAL MEDICINE

## 2024-03-12 VITALS
DIASTOLIC BLOOD PRESSURE: 66 MMHG | HEART RATE: 110 BPM | WEIGHT: 145 LBS | BODY MASS INDEX: 21.98 KG/M2 | SYSTOLIC BLOOD PRESSURE: 140 MMHG | OXYGEN SATURATION: 98 % | RESPIRATION RATE: 18 BRPM | HEIGHT: 68 IN | TEMPERATURE: 98 F

## 2024-03-12 DIAGNOSIS — E61.1 IRON DEFICIENCY: ICD-10-CM

## 2024-03-12 DIAGNOSIS — D50.0 IRON DEFICIENCY ANEMIA DUE TO CHRONIC BLOOD LOSS: ICD-10-CM

## 2024-03-12 DIAGNOSIS — C61 PROSTATE CANCER (HCC): ICD-10-CM

## 2024-03-12 DIAGNOSIS — C19 RECTOSIGMOID CANCER (HCC): ICD-10-CM

## 2024-03-12 DIAGNOSIS — C61 PROSTATE CANCER (HCC): Primary | ICD-10-CM

## 2024-03-12 LAB
ALBUMIN SERPL-MCNC: 4.8 G/DL (ref 3.2–4.8)
ALBUMIN/GLOB SERPL: 1.2 {RATIO} (ref 1–2)
ALP LIVER SERPL-CCNC: 57 U/L
ALT SERPL-CCNC: 85 U/L
ANION GAP SERPL CALC-SCNC: 11 MMOL/L (ref 0–18)
AST SERPL-CCNC: 98 U/L (ref ?–34)
BASOPHILS # BLD AUTO: 0.04 X10(3) UL (ref 0–0.2)
BASOPHILS NFR BLD AUTO: 0.6 %
BILIRUB SERPL-MCNC: 0.7 MG/DL (ref 0.2–1.1)
BUN BLD-MCNC: 13 MG/DL (ref 9–23)
BUN/CREAT SERPL: 11.7 (ref 10–20)
CALCIUM BLD-MCNC: 10.1 MG/DL (ref 8.7–10.4)
CHLORIDE SERPL-SCNC: 103 MMOL/L (ref 98–112)
CO2 SERPL-SCNC: 19 MMOL/L (ref 21–32)
CREAT BLD-MCNC: 1.11 MG/DL
DEPRECATED HBV CORE AB SER IA-ACNC: 83.2 NG/ML
DEPRECATED RDW RBC AUTO: 46.3 FL (ref 35.1–46.3)
EGFRCR SERPLBLD CKD-EPI 2021: 71 ML/MIN/1.73M2 (ref 60–?)
EOSINOPHIL # BLD AUTO: 0.11 X10(3) UL (ref 0–0.7)
EOSINOPHIL NFR BLD AUTO: 1.5 %
ERYTHROCYTE [DISTWIDTH] IN BLOOD BY AUTOMATED COUNT: 13.4 % (ref 11–15)
GLOBULIN PLAS-MCNC: 3.9 G/DL (ref 2.8–4.4)
GLUCOSE BLD-MCNC: 116 MG/DL (ref 70–99)
HCT VFR BLD AUTO: 34.1 %
HGB BLD-MCNC: 11.9 G/DL
IMM GRANULOCYTES # BLD AUTO: 0.01 X10(3) UL (ref 0–1)
IMM GRANULOCYTES NFR BLD: 0.1 %
IRON SATN MFR SERPL: 40 %
IRON SERPL-MCNC: 122 UG/DL
LYMPHOCYTES # BLD AUTO: 1.56 X10(3) UL (ref 1–4)
LYMPHOCYTES NFR BLD AUTO: 21.5 %
MCH RBC QN AUTO: 32.6 PG (ref 26–34)
MCHC RBC AUTO-ENTMCNC: 34.9 G/DL (ref 31–37)
MCV RBC AUTO: 93.4 FL
MONOCYTES # BLD AUTO: 0.59 X10(3) UL (ref 0.1–1)
MONOCYTES NFR BLD AUTO: 8.1 %
NEUTROPHILS # BLD AUTO: 4.94 X10 (3) UL (ref 1.5–7.7)
NEUTROPHILS # BLD AUTO: 4.94 X10(3) UL (ref 1.5–7.7)
NEUTROPHILS NFR BLD AUTO: 68.2 %
OSMOLALITY SERPL CALC.SUM OF ELEC: 277 MOSM/KG (ref 275–295)
PLATELET # BLD AUTO: 213 10(3)UL (ref 150–450)
POTASSIUM SERPL-SCNC: 4.8 MMOL/L (ref 3.5–5.1)
PROT SERPL-MCNC: 8.7 G/DL (ref 5.7–8.2)
PSA SERPL-MCNC: <0.04 NG/ML (ref ?–4)
RBC # BLD AUTO: 3.65 X10(6)UL
SODIUM SERPL-SCNC: 133 MMOL/L (ref 136–145)
TESTOST SERPL-MCNC: 15.05 NG/DL
TIBC SERPL-MCNC: 305 UG/DL (ref 250–425)
TRANSFERRIN SERPL-MCNC: 205 MG/DL (ref 215–365)
WBC # BLD AUTO: 7.3 X10(3) UL (ref 4–11)

## 2024-03-12 PROCEDURE — 82728 ASSAY OF FERRITIN: CPT

## 2024-03-12 PROCEDURE — 99214 OFFICE O/P EST MOD 30 MIN: CPT | Performed by: INTERNAL MEDICINE

## 2024-03-12 PROCEDURE — 85025 COMPLETE CBC W/AUTO DIFF WBC: CPT

## 2024-03-12 PROCEDURE — 83540 ASSAY OF IRON: CPT

## 2024-03-12 PROCEDURE — 80053 COMPREHEN METABOLIC PANEL: CPT

## 2024-03-12 PROCEDURE — 84403 ASSAY OF TOTAL TESTOSTERONE: CPT

## 2024-03-12 PROCEDURE — 84153 ASSAY OF PSA TOTAL: CPT

## 2024-03-12 PROCEDURE — 36415 COLL VENOUS BLD VENIPUNCTURE: CPT

## 2024-03-12 PROCEDURE — 84466 ASSAY OF TRANSFERRIN: CPT

## 2024-03-12 NOTE — PROGRESS NOTES
Cancer Center Progress Note    Patient Name: Lloyd Bui   YOB: 1950   Medical Record Number: [id number]   Attending Physician: Bhavin Gilbert M.D.     Chief Complaint:  State cancer rectosigmoid cancer    History of Present Illness:  Cancer history:  73 year old   with a family history of BRCA1 germ line mutation, being evaluated by Medical Oncology for NCCN high-risk localized prostate cancer.  He also has severe iron deficiency in the background of alcoholic liver disease.  With regard to his prostate cancer, he was diagnosed 04/20/2021 with biopsy showing grade group 4 in 2 cores, grade group 5 in 2 cores; grade group 1 and grade group 3 were also present on separate cores.  His PSA on 01/20/2021 was 10.4.    He has clinical T3 disease    With a severe iron deficiency who underwent colonoscopy 6/9/2021 and was found to have a rectosigmoid mass consistent with adenocarcinoma.  There is also invasive carcinoma in a rectosigmoid polyp. He underwent robotic assisted low anterior resection 7/2/2021 for stage I disease (pT2N0)      Further prostate cancer treatment history:  May 2021 met with medical oncology and radiation oncology started ADT 5/13/2021 Baseline PSA 10.4   6/11/2021 PSA 1.5 continued ADT  9/3/2021 PSA 0.35 continue ADT  10/18/2021 orders placed for abiraterone/prednisone based on updated results from [facility]  12/3/21 PSA 0.02 on ADT and abiraterone/prednisone  1/14/2022 PSA 0.01 on ADT and abiraterone/prednisone  3/4/2022 PSA 0.01 on ADT and abiraterone/prednisone  6/6/2022 PSA 0.01 on ADT and abiraterone/prednisone  7/18/2022 PSA 0.01 on ADT and abiraterone/prednisone  9/8/2022 PSA 0.01 on ADT and abiraterone/prednisone  12/13/2022 PSA 0.01 on ADT and abiraterone/prednisone  3/14/2023 PSA 0.01 on ADT and abiraterone/prednisone  6/13/2023 PSA 0.01 on ADT and abiraterone/prednisone  9/12/2023 PSA 0.01 finishing ADT and abiraterone/prednisone  12/12/23 PSA 0 off treatment  3/12/24 PSA 0 off  treatment        Interval history:  Returns for routine outpatient follow-up is doing reasonable overall denies any fevers or chills denies any night sweats or unintentional weight loss denies any melena or medic easier.  He is tolerating androgen deprivation therapy    Performance Status:  ECOG 0  Past Medical History:  Past Medical History:   Diagnosis Date    Anemia     Cirrhosis (HCC)     Elevated PSA, less than 10 ng/ml 1/21/2019    Essential hypertension     Prostate cancer (HCC) 04/20/2021    Visual impairment     reading glasses       Past Surgical History:  Past Surgical History:   Procedure Laterality Date    COLONOSCOPY  04/14/2022    COLONOSCOPY  06/09/2021    UPPER GI ENDOSCOPY,EXAM  05/19/2021       Family History:  Family History   Problem Relation Age of Onset    Cancer Father         esophageal Ca    Cancer Sister     Breast Cancer Sister         +BRCA       Social History:  Social History     Socioeconomic History    Marital status:    Tobacco Use    Smoking status: Never    Smokeless tobacco: Never   Vaping Use    Vaping Use: Never used   Substance and Sexual Activity    Alcohol use: Yes     Alcohol/week: 14.0 standard drinks of alcohol     Types: 14 Shots of liquor per week     Comment: 2 drinks of whisky daily    Drug use: Never         Current Medications:    Current Outpatient Medications:     PANTOPRAZOLE 20 MG Oral Tab EC, TAKE 1 TABLET BY MOUTH EVERY DAY, Disp: 90 tablet, Rfl: 0    amLODIPine Besylate-Valsartan  MG Oral Tab, Take 1 tablet by mouth daily., Disp: 90 tablet, Rfl: 1    melatonin 5 MG Oral Cap, Take 1 capsule (5 mg total) by mouth nightly., Disp: , Rfl:     Current Outpatient Medications on File Prior to Visit   Medication Sig Dispense Refill    PANTOPRAZOLE 20 MG Oral Tab EC TAKE 1 TABLET BY MOUTH EVERY DAY 90 tablet 0    amLODIPine Besylate-Valsartan  MG Oral Tab Take 1 tablet by mouth daily. 90 tablet 1    melatonin 5 MG Oral Cap Take 1 capsule (5 mg total)  by mouth nightly.       No current facility-administered medications on file prior to visit.         Allergies:  No Known Allergies     Review of Systems:  All other systems reviewed and negative x12    Vital Signs:  There were no vitals taken for this visit.    Physical Examination:  General: Patient is alert and oriented x 3, not in acute distress.  Psych:  Mood and affect appropriate  HEENT: EOMs intact. PERRL. Oropharynx is clear.   Neck: No JVD. No palpable lymphadenopathy. Neck is supple.  Lymphatics: There is no palpable peripheral lymphadenopathy   Chest: Symmetric expansion, nonlabored breathing  Cardiovascular: Regular with palpable distal pulses   Abdomen: Soft, non tender.   Extremities: No edema.  Neurological: 5/5 motor x4.        Laboratory:  WBC: 7.3, done on 3/12/2024.  HGB: 11.9, done on 3/12/2024.  PLT: 213, done on 3/12/2024.           Lab Results   Component Value Date     (H) 12/12/2023    BUN 9 12/12/2023    BUNCREA 6.0 (L) 12/12/2023    CREATSERUM 1.49 (H) 12/12/2023    ANIONGAP 8 12/12/2023    GFRNAA 95 07/18/2022    GFRAA 110 07/18/2022    CA 8.9 12/12/2023    OSMOCALC 275 12/12/2023    ALKPHO 57 12/12/2023    AST 76 (H) 12/12/2023    ALT 72 (H) 12/12/2023    BILT 0.2 12/12/2023    TP 7.6 12/12/2023    ALB 4.2 12/12/2023    GLOBULIN 3.4 12/12/2023     (L) 12/12/2023    K 4.0 12/12/2023     12/12/2023    CO2 22.0 12/12/2023     Radiology:  none     Cancer Staging   No matching staging information was found for the patient.      Impression and Plan:  73 year old   male with family history of BRCA1 germline mutation, alcoholic liver disease, severe iron deficiency anemia, being evaluated by Hematology/Oncology for localized NCCN high-risk prostate cancer, diagnosed April 2021 as outlined above.      With a severe iron deficiency who underwent colonoscopy 6/9/2021 and was found to have a rectosigmoid mass consistent with adenocarcinoma.  There is also invasive carcinoma in a  rectosigmoid polyp. He underwent robotic assisted low anterior resection 7/2/2021 for stage I disease (pT2N0 rectosigmoid)    - Prostate cancer now complete PSA remains controlled    -We have placed a genetics consultation however he has not followed up for this  -For rectosigmoid cancer no evidence of recurrence repeat imaging in June 2024.    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

## 2024-04-13 DIAGNOSIS — K21.9 GASTROESOPHAGEAL REFLUX DISEASE WITHOUT ESOPHAGITIS: ICD-10-CM

## 2024-04-13 DIAGNOSIS — R05.3 CHRONIC COUGH: ICD-10-CM

## 2024-04-15 RX ORDER — PANTOPRAZOLE SODIUM 20 MG/1
20 TABLET, DELAYED RELEASE ORAL DAILY
Qty: 90 TABLET | Refills: 0 | Status: SHIPPED | OUTPATIENT
Start: 2024-04-15

## 2024-04-22 ENCOUNTER — PATIENT MESSAGE (OUTPATIENT)
Dept: INTERNAL MEDICINE CLINIC | Facility: CLINIC | Age: 74
End: 2024-04-22

## 2024-04-22 DIAGNOSIS — K74.60 CIRRHOSIS OF LIVER WITHOUT ASCITES, UNSPECIFIED HEPATIC CIRRHOSIS TYPE (HCC): Primary | ICD-10-CM

## 2024-04-24 NOTE — TELEPHONE ENCOUNTER
From: Lloyd CALDERON [facility]  To: Iesha Mccormackderon  Sent: 4/22/2024 9:54 AM CDT  Subject: Prior to May 15 visit and May 15 visit    Hi Dr Hess, this is Lloyd Low’s older daughter. We have met a few times in the past, including in 2021 to get to my dad’s diagnoses. He is finally open to having the ultrasound that Dr Mccauley prescribed 3 years ago, and would like to have it prior to meeting with you. I believe he can just go to the lab to do that. Could you put the order in for the liver ultrasound?    Also, on May 15th, my younger sister Claudia will be accompanying my dad for his visit with you. She is a trauma  and mental health professional. Hoping we can get my dad to committing to a more complete and whole way of living via wellness, exercise, and healthy mental health. Thanks!

## 2024-04-25 NOTE — TELEPHONE ENCOUNTER
He is very overdue for his follow-up with Dr. Mccauley. They need to f/u with him. As for the liver ultrasound he needs montioring due to advance fibrosis and also if he is still drinking. I can go ahead and place the order for this however he ultimately needs to see Dr. Mccauley for its management.

## 2024-05-15 ENCOUNTER — OFFICE VISIT (OUTPATIENT)
Dept: INTERNAL MEDICINE CLINIC | Facility: CLINIC | Age: 74
End: 2024-05-15

## 2024-05-15 VITALS
OXYGEN SATURATION: 98 % | SYSTOLIC BLOOD PRESSURE: 110 MMHG | WEIGHT: 144 LBS | DIASTOLIC BLOOD PRESSURE: 68 MMHG | BODY MASS INDEX: 21.82 KG/M2 | HEART RATE: 100 BPM | TEMPERATURE: 98 F | HEIGHT: 68 IN

## 2024-05-15 DIAGNOSIS — C61 PROSTATE CANCER (HCC): ICD-10-CM

## 2024-05-15 DIAGNOSIS — K62.7 RADIATION INDUCED PROCTITIS: ICD-10-CM

## 2024-05-15 DIAGNOSIS — K74.00 LIVER FIBROSIS: ICD-10-CM

## 2024-05-15 DIAGNOSIS — Z85.048 HISTORY OF CANCER OF RECTOSIGMOID JUNCTION: ICD-10-CM

## 2024-05-15 DIAGNOSIS — D50.0 IRON DEFICIENCY ANEMIA DUE TO CHRONIC BLOOD LOSS: ICD-10-CM

## 2024-05-15 DIAGNOSIS — Z13.6 SCREENING FOR CARDIOVASCULAR CONDITION: ICD-10-CM

## 2024-05-15 DIAGNOSIS — K21.9 GASTROESOPHAGEAL REFLUX DISEASE WITHOUT ESOPHAGITIS: ICD-10-CM

## 2024-05-15 DIAGNOSIS — D50.9 IRON DEFICIENCY ANEMIA, UNSPECIFIED IRON DEFICIENCY ANEMIA TYPE: ICD-10-CM

## 2024-05-15 DIAGNOSIS — Z00.00 ENCOUNTER FOR ANNUAL HEALTH EXAMINATION: Primary | ICD-10-CM

## 2024-05-15 DIAGNOSIS — I10 ESSENTIAL HYPERTENSION: ICD-10-CM

## 2024-05-15 PROBLEM — E61.1 IRON DEFICIENCY: Status: RESOLVED | Noted: 2021-05-19 | Resolved: 2024-05-15

## 2024-05-15 PROBLEM — R05.3 CHRONIC COUGH: Status: RESOLVED | Noted: 2019-01-11 | Resolved: 2024-05-15

## 2024-05-15 RX ORDER — AMLODIPINE AND VALSARTAN 10; 320 MG/1; MG/1
1 TABLET ORAL DAILY
Qty: 90 TABLET | Refills: 1 | Status: SHIPPED | OUTPATIENT
Start: 2024-05-15

## 2024-05-15 RX ORDER — PANTOPRAZOLE SODIUM 20 MG/1
20 TABLET, DELAYED RELEASE ORAL DAILY
Qty: 90 TABLET | Refills: 1 | Status: SHIPPED | OUTPATIENT
Start: 2024-05-15

## 2024-05-15 RX ORDER — GLUCOSAMINE SULFATE 500 MG
CAPSULE ORAL
COMMUNITY

## 2024-05-15 NOTE — PROGRESS NOTES
Subjective:   Lloyd Bui is a 73 year old male who presents for a Medicare Subsequent Annual Wellness visit (Pt already had Initial Annual Wellness) and scheduled follow up of multiple significant but stable problems.       History/Other:   Fall Risk Assessment:   He has been screened for Falls and is High Risk. Fall Prevention information provided to patient in After Visit Summary.    Do you feel unsteady when standing or walking?: Yes  Do you worry about falling?: Yes  Have you fallen in the past year?: Yes  How many times have you fallen?: 2  Were you injured?: Yes     Cognitive Assessment:   He had a completely normal cognitive assessment - see flowsheet entries     Functional Ability/Status:   Lloyd Bui has a completely normal functional assessment. See flowsheet for details.        Depression Screening (PHQ-2/PHQ-9): PHQ-2 SCORE: 0  , done 5/15/2024        <5 minutes spent screening and counseling for depression    Advanced Directives:   He does have a Living Will but we do NOT have it on file in Epic.    He does NOT have a Power of  for Health Care. [Do you have a healthcare power of ?: No]  Discussed Advance Care Planning with patient (and family/surrogate if present). Standard forms made available to patient in After Visit Summary.      Patient Active Problem List   Diagnosis    Essential hypertension    Anemia    Prostate cancer (HCC)    Liver fibrosis    History of cancer of rectosigmoid junction    Gastroesophageal reflux disease without esophagitis    Radiation induced proctitis     Allergies:  He has No Known Allergies.    Current Medications:  Outpatient Medications Marked as Taking for the 5/15/24 encounter (Office Visit) with Iesha Villela MD   Medication Sig    Glucosamine 500 MG Oral Cap Take by mouth.    amLODIPine Besylate-Valsartan  MG Oral Tab Take 1 tablet by mouth daily.    pantoprazole 20 MG Oral Tab EC Take 1 tablet (20 mg total) by mouth daily.     melatonin 5 MG Oral Cap Take 1 capsule (5 mg total) by mouth nightly.       Medical History:  He  has a past medical history of Anemia, Cirrhosis (HCC), Elevated PSA, less than 10 ng/ml (01/21/2019), Essential hypertension, Prostate cancer (HCC) (04/20/2021), Rectosigmoid cancer (HCC) (11/15/2023), and Visual impairment.  Surgical History:  He  has a past surgical history that includes upper gi endoscopy,exam (05/19/2021); colonoscopy (04/14/2022); and colonoscopy (06/09/2021).   Family History:  His family history includes Breast Cancer in his sister; Cancer in his father and sister.  Social History:  He  reports that he has never smoked. He has never used smokeless tobacco. He reports current alcohol use of about 14.0 standard drinks of alcohol per week. He reports that he does not use drugs.    Tobacco:  He has never smoked tobacco.    CAGE Alcohol Screen:   He has been screened for alcohol abuse and his score is not 0:  Cut: Have you ever felt you should Cut down on your drinking?: No  Annoyed: Have people Annoyed you by criticizing your drinking?: Yes  Guilty: Have you ever felt bad or Guilty about your drinking?: No  Eye Opener: Have you ever had a drink first thing in the morning to steady your nerves or to get rid of a hangover (Eye opener)?: Yes  Total Score: 2      Patient Care Team:  Iesha Villela MD as PCP - General (Family Medicine)  Joseluis Cruz MD (Radiation Oncology)  Bhavin Gilbert MD (Hematology and Oncology)  Donn Thompson MD (Radiation Oncology)    Review of Systems   Constitutional:  Negative for fatigue and fever.   Respiratory:  Negative for cough and shortness of breath.    Cardiovascular:  Negative for chest pain, palpitations and leg swelling.   Gastrointestinal:  Negative for abdominal pain, constipation, diarrhea and vomiting.   Musculoskeletal:  Positive for arthralgias.   Neurological:  Negative for headaches.          Objective:   Physical Exam  Vitals reviewed.    Constitutional:       General: He is not in acute distress.     Appearance: He is well-developed.   HENT:      Head: Normocephalic.      Right Ear: Tympanic membrane and external ear normal.      Left Ear: Tympanic membrane and external ear normal.   Eyes:      Conjunctiva/sclera: Conjunctivae normal.      Pupils: Pupils are equal, round, and reactive to light.   Neck:      Thyroid: No thyromegaly.   Cardiovascular:      Rate and Rhythm: Normal rate and regular rhythm.      Heart sounds: Normal heart sounds. No murmur heard.  Pulmonary:      Effort: Pulmonary effort is normal. No respiratory distress.      Breath sounds: Normal breath sounds.   Abdominal:      General: Bowel sounds are normal. There is no distension.      Palpations: Abdomen is soft.      Tenderness: There is no abdominal tenderness.   Musculoskeletal:         General: Normal range of motion.      Cervical back: Normal range of motion and neck supple.      Right lower leg: No edema.      Left lower leg: No edema.   Skin:     Findings: No rash.   Neurological:      General: No focal deficit present.      Cranial Nerves: No cranial nerve deficit.          /68   Pulse 100   Temp 98.4 °F (36.9 °C)   Ht 5' 8\" (1.727 m)   Wt 144 lb (65.3 kg)   SpO2 98%   BMI 21.90 kg/m²  Estimated body mass index is 21.9 kg/m² as calculated from the following:    Height as of this encounter: 5' 8\" (1.727 m).    Weight as of this encounter: 144 lb (65.3 kg).    Medicare Hearing Assessment:   Hearing Screening    Screening Method: Finger Rub  Finger Rub Result: Pass         Visual Acuity:   Right Eye Visual Acuity: Corrected Right Eye Chart Acuity: 20/20   Left Eye Visual Acuity: Corrected Left Eye Chart Acuity: 20/20   Both Eyes Visual Acuity: Corrected Both Eyes Chart Acuity: 20/20   Able To Tolerate Visual Acuity: Yes        Assessment & Plan:   Lloyd Bui is a 73 year old male who presents for a Medicare Assessment.     1. Encounter for annual health  examination (Primary)  -     Pt reassured and all questions answered.  -     Age/sex specific preventive measures/immunizations reviewed and discussed with pt.  -     Pt counseled with regards to diet and exercise.    2. Essential hypertension  -     amLODIPine Besylate-Valsartan; Take 1 tablet by mouth daily.  Dispense: 90 tablet; Refill: 1    -stable; controlled  -continue meds  -watch salt intake, regular exercise, DASH/heart healthy eating  -monitor home BP regularly and maintain log; if elevated reading >160/100 notify Md.    3. Prostate cancer (HCC)  -Active treatment for current cancer, maintained on ADT with abiraterone.  CPM with neprho, urology, and oncology  -Adenocarcinoma of the prostate, grade group 5, PSA 10.4, high risk     4. History of cancer of rectosigmoid junction  -s/p robotic assisted low anterior resection 7/2/2021 for stage I disease (pT2N0 rectosigmoid)   -CPM with GI  and oncology    5. Iron deficiency anemia, unspecified iron deficiency anemia type  10. Radiation induced proctitis  -CPM with heme and infusions and GI f/u    6. Liver fibrosis  -CPM with Dr. Mccauley    7. Gastroesophageal reflux disease without esophagitis  -     Pantoprazole Sodium; Take 1 tablet (20 mg total) by mouth daily.  Dispense: 90 tablet; Refill: 1  8. Screening for cardiovascular condition  -     CT CALCIUM SCORING; Future; Expected date: 05/15/2024        The patient indicates understanding of these issues and agrees to the plan.  Reinforced healthy diet, lifestyle, and exercise.      Return in about 6 months (around 11/15/2024) for Blood Pressure follow-up.     Spent 30 minutes  (10min preventative and 20min acute/chronic) including chart review, reviewing appropriate medical history, evaluating patient, discussing treatment options, counseling and education (diet and exercise), ordering appropriate diagnostic tests and completing documentation.      Iesha De La Rosa MD, 5/15/2024     Supplementary  Documentation:   General Health:  In the past six months, have you lost more than 10 pounds without trying?: 1 - Yes  Has your appetite been poor?: No  Type of Diet: Vegetarian  How does the patient maintain a good energy level?: Other  How would you describe your daily physical activity?: Light  How would you describe your current health state?: Fair  How do you maintain positive mental well-being?: Visiting Family  On a scale of 0 to 10, with 0 being no pain and 10 being severe pain, what is your pain level?: 1 - (Mild)  In the past six months, have you experienced urine leakage?: 0-No  At any time do you feel concerned for the safety/well-being of yourself and/or your children, in your home or elsewhere?: No  Have you had any immunizations at another office such as Influenza, Hepatitis B, Tetanus, or Pneumococcal?: No          Lloyd Bui's SCREENING SCHEDULE   Tests on this list are recommended by your physician but may not be covered, or covered at this frequency, by your insurer.   Please check with your insurance carrier before scheduling to verify coverage.   PREVENTATIVE SERVICES FREQUENCY &  COVERAGE DETAILS LAST COMPLETION DATE   Diabetes Screening    Fasting Blood Sugar / Glucose    One screening every 12 months if never tested or if previously tested but not diagnosed with pre-diabetes   One screening every 6 months if diagnosed with pre-diabetes Lab Results   Component Value Date     (H) 03/12/2024        Cardiovascular Disease Screening    Lipid Panel  Cholesterol  Lipoprotein (HDL)  Triglycerides Covered every 5 years for all Medicare beneficiaries without apparent signs or symptoms of cardiovascular disease Lab Results   Component Value Date    CHOLEST 187 12/12/2023    HDL 66 (H) 12/12/2023    LDL 87 12/12/2023    TRIG 202 (H) 12/12/2023         Electrocardiogram (EKG)   Covered if needed at Welcome to Medicare, and non-screening if indicated for medical reasons 11/23/2022      Ultrasound  Screening for Abdominal Aortic Aneurysm (AAA) Covered once in a lifetime for one of the following risk factors    Men who are 65-75 years old and have ever smoked    Anyone with a family history -     Colorectal Cancer Screening  Covered for ages 50-85; only need ONE of the following:    Colonoscopy   Covered every 10 years    Covered every 2 years if patient is at high risk or previous colonoscopy was abnormal 04/13/2022    Health Maintenance   Topic Date Due    Colorectal Cancer Screening  04/13/2025       Flexible Sigmoidoscopy   Covered every 4 years -    Fecal Occult Blood Test Covered annually -   Prostate Cancer Screening    Prostate-Specific Antigen (PSA) Annually Lab Results   Component Value Date    PSA <0.01 09/12/2023     Health Maintenance   Topic Date Due    PSA  03/12/2026      Immunizations    Influenza Covered once per flu season  Please get every year -  No recommendations at this time    Pneumococcal Each vaccine (Rkgnngv43 & Fqoioodoa64) covered once after 65 Prevnar 13: -    [id number]: -     Pneumococcal Vaccination(1 of 2 - PCV) Never done    Hepatitis B One screening covered for patients with certain risk factors   -  No recommendations at this time    Tetanus Toxoid Not covered by Medicare Part B unless medically necessary (cut with metal); may be covered with your pharmacy prescription benefits -    Tetanus, Diptheria and Pertusis TD and TDaP Not covered by Medicare Part B -  No recommendations at this time    Zoster Not covered by Medicare Part B; may be covered with your pharmacy  prescription benefits -  Zoster Vaccines(1 of 2) Never done     Annual Monitoring of Persistent Medications (ACE/ARB, digoxin diuretics, anticonvulsants)    Potassium Annually Lab Results   Component Value Date    K 4.8 03/12/2024         Creatinine   Annually Lab Results   Component Value Date    CREATSERUM 1.11 03/12/2024         BUN Annually Lab Results   Component Value Date    BUN 13 03/12/2024       Drug  Serum Conc Annually No results found for: \"DIGOXIN\", \"DIG\", \"VALP\"

## 2024-05-15 NOTE — PATIENT INSTRUCTIONS
Lloyd Bui's SCREENING SCHEDULE   Tests on this list are recommended by your physician but may not be covered, or covered at this frequency, by your insurer.   Please check with your insurance carrier before scheduling to verify coverage.   PREVENTATIVE SERVICES FREQUENCY &  COVERAGE DETAILS LAST COMPLETION DATE   Diabetes Screening    Fasting Blood Sugar / Glucose    One screening every 12 months if never tested or if previously tested but not diagnosed with pre-diabetes   One screening every 6 months if diagnosed with pre-diabetes Lab Results   Component Value Date     (H) 03/12/2024        Cardiovascular Disease Screening    Lipid Panel  Cholesterol  Lipoprotein (HDL)  Triglycerides Covered every 5 years for all Medicare beneficiaries without apparent signs or symptoms of cardiovascular disease Lab Results   Component Value Date    CHOLEST 187 12/12/2023    HDL 66 (H) 12/12/2023    LDL 87 12/12/2023    TRIG 202 (H) 12/12/2023         Electrocardiogram (EKG)   Covered if needed at Welcome to Medicare, and non-screening if indicated for medical reasons 11/23/2022      Ultrasound Screening for Abdominal Aortic Aneurysm (AAA) Covered once in a lifetime for one of the following risk factors   • Men who are 65-75 years old and have ever smoked   • Anyone with a family history -     Colorectal Cancer Screening  Covered for ages 50-85; only need ONE of the following:    Colonoscopy   Covered every 10 years    Covered every 2 years if patient is at high risk or previous colonoscopy was abnormal 04/13/2022    Health Maintenance   Topic Date Due   • Colorectal Cancer Screening  04/13/2025       Flexible Sigmoidoscopy   Covered every 4 years -    Fecal Occult Blood Test Covered annually -   Prostate Cancer Screening    Prostate-Specific Antigen (PSA) Annually Lab Results   Component Value Date    PSA <0.01 09/12/2023     Health Maintenance   Topic Date Due   • PSA  03/12/2026      Immunizations    Influenza Covered  once per flu season  Please get every year -  No recommendations at this time    Pneumococcal Each vaccine (Jilltgy19 & Kwpjrizzg77) covered once after 65 Prevnar 13: -    Vohmytoxv64: -     No recommendations at this time    Hepatitis B One screening covered for patients with certain risk factors   -  No recommendations at this time    Tetanus Toxoid Not covered by Medicare Part B unless medically necessary (cut with metal); may be covered with your pharmacy prescription benefits -    Tetanus, Diptheria and Pertusis TD and TDaP Not covered by Medicare Part B -  No recommendations at this time    Zoster Not covered by Medicare Part B; may be covered with your pharmacy  prescription benefits -  No recommendations at this time     Annual Monitoring of Persistent Medications (ACE/ARB, digoxin diuretics, anticonvulsants)    Potassium Annually Lab Results   Component Value Date    K 4.8 03/12/2024         Creatinine   Annually Lab Results   Component Value Date    CREATSERUM 1.11 03/12/2024         BUN Annually Lab Results   Component Value Date    BUN 13 03/12/2024       Drug Serum Conc Annually No results found for: \"DIGOXIN\", \"DIG\", \"VALP\"

## 2024-05-20 PROBLEM — K62.7 RADIATION INDUCED PROCTITIS: Status: ACTIVE | Noted: 2024-05-20

## 2024-05-20 PROBLEM — C19 RECTOSIGMOID CANCER (HCC): Status: RESOLVED | Noted: 2023-11-15 | Resolved: 2024-05-20

## 2024-05-20 PROBLEM — Z85.46 HISTORY OF PROSTATE CANCER: Status: RESOLVED | Noted: 2024-05-20 | Resolved: 2024-05-20

## 2024-05-20 PROBLEM — Z85.46 HISTORY OF PROSTATE CANCER: Status: ACTIVE | Noted: 2024-05-20

## 2024-05-22 ENCOUNTER — HOSPITAL ENCOUNTER (OUTPATIENT)
Dept: ULTRASOUND IMAGING | Age: 74
Discharge: HOME | End: 2024-05-22
Attending: FAMILY MEDICINE

## 2024-05-22 DIAGNOSIS — K74.60 CIRRHOSIS OF LIVER WITHOUT ASCITES, UNSPECIFIED HEPATIC CIRRHOSIS TYPE (HCC): ICD-10-CM

## 2024-05-22 PROCEDURE — 76705 ECHO EXAM OF ABDOMEN: CPT | Performed by: FAMILY MEDICINE

## 2024-05-22 PROCEDURE — 76981 USE PARENCHYMA: CPT | Performed by: FAMILY MEDICINE

## 2024-05-24 DIAGNOSIS — C61 PROSTATE CANCER (HCC): Primary | ICD-10-CM

## 2024-05-24 DIAGNOSIS — E61.1 IRON DEFICIENCY: ICD-10-CM

## 2024-06-11 ENCOUNTER — LAB ENCOUNTER (OUTPATIENT)
Dept: LAB | Facility: HOSPITAL | Age: 74
End: 2024-06-11
Attending: INTERNAL MEDICINE
Payer: MEDICARE

## 2024-06-11 ENCOUNTER — HOSPITAL ENCOUNTER (OUTPATIENT)
Dept: CT IMAGING | Facility: HOSPITAL | Age: 74
Discharge: HOME | End: 2024-06-11
Attending: INTERNAL MEDICINE
Payer: MEDICARE

## 2024-06-11 DIAGNOSIS — C61 PROSTATE CANCER (HCC): ICD-10-CM

## 2024-06-11 DIAGNOSIS — E61.1 IRON DEFICIENCY: ICD-10-CM

## 2024-06-11 DIAGNOSIS — R79.89 ELEVATED LIVER FUNCTION TESTS: Primary | ICD-10-CM

## 2024-06-11 DIAGNOSIS — C19 RECTOSIGMOID CANCER (HCC): ICD-10-CM

## 2024-06-11 LAB
ALBUMIN SERPL-MCNC: 4.7 G/DL (ref 3.2–4.8)
ALBUMIN/GLOB SERPL: 1.4 {RATIO} (ref 1–2)
ALP LIVER SERPL-CCNC: 50 U/L
ALT SERPL-CCNC: 61 U/L
ANION GAP SERPL CALC-SCNC: 11 MMOL/L (ref 0–18)
AST SERPL-CCNC: 89 U/L (ref ?–34)
BASOPHILS # BLD AUTO: 0.02 X10(3) UL (ref 0–0.2)
BASOPHILS NFR BLD AUTO: 0.5 %
BILIRUB SERPL-MCNC: 0.3 MG/DL (ref 0.2–1.1)
BUN BLD-MCNC: 16 MG/DL (ref 9–23)
BUN/CREAT SERPL: 14.2 (ref 10–20)
CALCIUM BLD-MCNC: 9.3 MG/DL (ref 8.7–10.4)
CHLORIDE SERPL-SCNC: 102 MMOL/L (ref 98–112)
CO2 SERPL-SCNC: 18 MMOL/L (ref 21–32)
CREAT BLD-MCNC: 1.13 MG/DL
CREAT BLD-MCNC: 1.3 MG/DL
DEPRECATED HBV CORE AB SER IA-ACNC: 21.7 NG/ML
DEPRECATED RDW RBC AUTO: 46.3 FL (ref 35.1–46.3)
EGFRCR SERPLBLD CKD-EPI 2021: 58 ML/MIN/1.73M2 (ref 60–?)
EGFRCR SERPLBLD CKD-EPI 2021: 69 ML/MIN/1.73M2 (ref 60–?)
EOSINOPHIL # BLD AUTO: 0.17 X10(3) UL (ref 0–0.7)
EOSINOPHIL NFR BLD AUTO: 4.1 %
ERYTHROCYTE [DISTWIDTH] IN BLOOD BY AUTOMATED COUNT: 13.5 % (ref 11–15)
FASTING STATUS PATIENT QL REPORTED: YES
GLOBULIN PLAS-MCNC: 3.4 G/DL (ref 2–3.5)
GLUCOSE BLD-MCNC: 97 MG/DL (ref 70–99)
HAV AB SER QL IA: REACTIVE
HAV IGM SER QL: NONREACTIVE
HBV CORE AB SERPL QL IA: NONREACTIVE
HBV SURFACE AB SER QL: NONREACTIVE
HBV SURFACE AB SERPL IA-ACNC: <3.1 MIU/ML
HBV SURFACE AG SER-ACNC: 0.11 [IU]/L
HBV SURFACE AG SERPL QL IA: NONREACTIVE
HCT VFR BLD AUTO: 26.8 %
HCV AB SERPL QL IA: NONREACTIVE
HGB BLD-MCNC: 9.5 G/DL
IMM GRANULOCYTES # BLD AUTO: 0.02 X10(3) UL (ref 0–1)
IMM GRANULOCYTES NFR BLD: 0.5 %
IRON SATN MFR SERPL: 10 %
IRON SERPL-MCNC: 33 UG/DL
LYMPHOCYTES # BLD AUTO: 0.83 X10(3) UL (ref 1–4)
LYMPHOCYTES NFR BLD AUTO: 20 %
MCH RBC QN AUTO: 33.1 PG (ref 26–34)
MCHC RBC AUTO-ENTMCNC: 35.4 G/DL (ref 31–37)
MCV RBC AUTO: 93.4 FL
MONOCYTES # BLD AUTO: 0.56 X10(3) UL (ref 0.1–1)
MONOCYTES NFR BLD AUTO: 13.5 %
NEUTROPHILS # BLD AUTO: 2.56 X10 (3) UL (ref 1.5–7.7)
NEUTROPHILS # BLD AUTO: 2.56 X10(3) UL (ref 1.5–7.7)
NEUTROPHILS NFR BLD AUTO: 61.4 %
OSMOLALITY SERPL CALC.SUM OF ELEC: 273 MOSM/KG (ref 275–295)
PLATELET # BLD AUTO: 181 10(3)UL (ref 150–450)
POTASSIUM SERPL-SCNC: 3.8 MMOL/L (ref 3.5–5.1)
PROT SERPL-MCNC: 8.1 G/DL (ref 5.7–8.2)
PSA SERPL-MCNC: <0.04 NG/ML (ref ?–4)
RBC # BLD AUTO: 2.87 X10(6)UL
SODIUM SERPL-SCNC: 131 MMOL/L (ref 136–145)
TESTOST SERPL-MCNC: 24.07 NG/DL
TIBC SERPL-MCNC: 322 UG/DL (ref 250–425)
TRANSFERRIN SERPL-MCNC: 216 MG/DL (ref 215–365)
WBC # BLD AUTO: 4.2 X10(3) UL (ref 4–11)

## 2024-06-11 PROCEDURE — 86709 HEPATITIS A IGM ANTIBODY: CPT

## 2024-06-11 PROCEDURE — 71260 CT THORAX DX C+: CPT | Performed by: INTERNAL MEDICINE

## 2024-06-11 PROCEDURE — 87340 HEPATITIS B SURFACE AG IA: CPT

## 2024-06-11 PROCEDURE — 74177 CT ABD & PELVIS W/CONTRAST: CPT | Performed by: INTERNAL MEDICINE

## 2024-06-11 PROCEDURE — 80053 COMPREHEN METABOLIC PANEL: CPT

## 2024-06-11 PROCEDURE — 86803 HEPATITIS C AB TEST: CPT

## 2024-06-11 PROCEDURE — 84153 ASSAY OF PSA TOTAL: CPT

## 2024-06-11 PROCEDURE — 84466 ASSAY OF TRANSFERRIN: CPT

## 2024-06-11 PROCEDURE — 82728 ASSAY OF FERRITIN: CPT

## 2024-06-11 PROCEDURE — 85025 COMPLETE CBC W/AUTO DIFF WBC: CPT

## 2024-06-11 PROCEDURE — 86706 HEP B SURFACE ANTIBODY: CPT

## 2024-06-11 PROCEDURE — 36415 COLL VENOUS BLD VENIPUNCTURE: CPT

## 2024-06-11 PROCEDURE — 83540 ASSAY OF IRON: CPT

## 2024-06-11 PROCEDURE — 84403 ASSAY OF TOTAL TESTOSTERONE: CPT

## 2024-06-11 PROCEDURE — 82565 ASSAY OF CREATININE: CPT

## 2024-06-11 PROCEDURE — 86704 HEP B CORE ANTIBODY TOTAL: CPT

## 2024-06-11 PROCEDURE — 86708 HEPATITIS A ANTIBODY: CPT

## 2024-06-14 ENCOUNTER — APPOINTMENT (OUTPATIENT)
Dept: HEMATOLOGY/ONCOLOGY | Facility: HOSPITAL | Age: 74
End: 2024-06-14
Attending: INTERNAL MEDICINE

## 2024-06-14 ENCOUNTER — OFFICE VISIT (OUTPATIENT)
Dept: HEMATOLOGY/ONCOLOGY | Facility: HOSPITAL | Age: 74
End: 2024-06-14
Attending: INTERNAL MEDICINE
Payer: MEDICARE

## 2024-06-14 VITALS
DIASTOLIC BLOOD PRESSURE: 53 MMHG | OXYGEN SATURATION: 98 % | RESPIRATION RATE: 18 BRPM | HEIGHT: 68 IN | BODY MASS INDEX: 22.28 KG/M2 | SYSTOLIC BLOOD PRESSURE: 105 MMHG | HEART RATE: 84 BPM | WEIGHT: 147 LBS | TEMPERATURE: 98 F

## 2024-06-14 DIAGNOSIS — C61 PROSTATE CANCER (HCC): Primary | ICD-10-CM

## 2024-06-14 DIAGNOSIS — D50.0 IRON DEFICIENCY ANEMIA DUE TO CHRONIC BLOOD LOSS: ICD-10-CM

## 2024-06-14 DIAGNOSIS — E61.1 IRON DEFICIENCY: ICD-10-CM

## 2024-06-14 PROCEDURE — G2211 COMPLEX E/M VISIT ADD ON: HCPCS | Performed by: STUDENT IN AN ORGANIZED HEALTH CARE EDUCATION/TRAINING PROGRAM

## 2024-06-14 PROCEDURE — 99215 OFFICE O/P EST HI 40 MIN: CPT | Performed by: STUDENT IN AN ORGANIZED HEALTH CARE EDUCATION/TRAINING PROGRAM

## 2024-06-15 NOTE — PROGRESS NOTES
Cancer Center Progress Note    Patient Name: Lloyd Bui   YOB: 1950   Medical Record Number: [id number]     Chief Complaint:  Prostate cancer rectosigmoid cancer    History of Present Illness:  Cancer history:  73 year old with a family history of BRCA1 germ line mutation, who follows with medical oncology regarding clinically localized high risk prostate cancer who was treated with ADT, abiraterone, EBRT from May 2021 through September 2023.  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      Further prostate cancer treatment history:  May 2021 met with medical oncology and radiation oncology started ADT 5/13/2021 Baseline PSA 10.4   6/11/2021 PSA 1.5 continued ADT  9/3/2021 PSA 0.35 continue ADT  10/18/2021 orders placed for abiraterone/prednisone based on updated results from [facility]  12/3/21 PSA 0.02 on ADT and abiraterone/prednisone  1/14/2022 PSA 0.01 on ADT and abiraterone/prednisone  3/4/2022 PSA 0.01 on ADT and abiraterone/prednisone  6/6/2022 PSA 0.01 on ADT and abiraterone/prednisone  7/18/2022 PSA 0.01 on ADT and abiraterone/prednisone  9/8/2022 PSA 0.01 on ADT and abiraterone/prednisone  12/13/2022 PSA 0.01 on ADT and abiraterone/prednisone  3/14/2023 PSA 0.01 on ADT and abiraterone/prednisone  6/13/2023 PSA 0.01 on ADT and abiraterone/prednisone  9/12/2023 PSA 0.01 finishing ADT and abiraterone/prednisone  12/12/23 PSA 0 off treatment  3/12/24 PSA 0 off treatment  6/14/24 PSA undetectable       Interval history:  Generally stable.  Feels well but he does continue to have intermittent GI bleeding which is frustrating.    Performance Status:  ECOG 0  Past Medical History:  Past Medical History:    Anemia    Cirrhosis (HCC)    Elevated PSA, less than 10 ng/ml    Essential hypertension     Prostate cancer (HCC)    Rectosigmoid cancer (HCC)    Visual impairment    reading glasses       Past Surgical History:  Past Surgical History:   Procedure Laterality Date    Colonoscopy  04/14/2022    Colonoscopy  06/09/2021    Upper gi endoscopy,exam  05/19/2021       Family History:  Family History   Problem Relation Age of Onset    Cancer Father         esophageal Ca    Cancer Sister     Breast Cancer Sister         +BRCA       Social History:  Social History     Socioeconomic History    Marital status:    Tobacco Use    Smoking status: Never    Smokeless tobacco: Never   Vaping Use    Vaping status: Never Used   Substance and Sexual Activity    Alcohol use: Yes     Alcohol/week: 14.0 standard drinks of alcohol     Types: 14 Shots of liquor per week     Comment: 2 drinks of whisky daily    Drug use: Never         Current Medications:    Current Outpatient Medications:     Glucosamine 500 MG Oral Cap, Take by mouth., Disp: , Rfl:     amLODIPine Besylate-Valsartan  MG Oral Tab, Take 1 tablet by mouth daily., Disp: 90 tablet, Rfl: 1    pantoprazole 20 MG Oral Tab EC, Take 1 tablet (20 mg total) by mouth daily., Disp: 90 tablet, Rfl: 1    melatonin 5 MG Oral Cap, Take 1 capsule (5 mg total) by mouth nightly., Disp: , Rfl:     Current Outpatient Medications on File Prior to Visit   Medication Sig Dispense Refill    Glucosamine 500 MG Oral Cap Take by mouth.      amLODIPine Besylate-Valsartan  MG Oral Tab Take 1 tablet by mouth daily. 90 tablet 1    pantoprazole 20 MG Oral Tab EC Take 1 tablet (20 mg total) by mouth daily. 90 tablet 1    melatonin 5 MG Oral Cap Take 1 capsule (5 mg total) by mouth nightly.       Current Facility-Administered Medications on File Prior to Visit   Medication Dose Route Frequency Provider Last Rate Last Admin    [COMPLETED] iopamidol 76% (ISOVUE-370) injection for power injector  80 mL Intravenous ONCE PRN Bhavin Gilbert MD   80 mL at 06/11/24 1045          Allergies:  No Known Allergies     Review of Systems:  All other systems reviewed and negative x12    Vital Signs:  /53 (BP Location: Left arm, Patient Position: Sitting, Cuff Size: adult)   Pulse 84   Temp 98.3 °F (36.8 °C) (Oral)   Resp 18   Ht 1.727 m (5' 8\")   Wt 66.7 kg (147 lb)   SpO2 98%   BMI 22.35 kg/m²     Physical Examination:  General: Patient is alert and oriented x 3, not in acute distress.  Psych:  Mood and affect appropriate  HEENT: EOMs intact. PERRL. Oropharynx is clear.   Neck: No JVD. No palpable lymphadenopathy. Neck is supple.  Lymphatics: There is no palpable peripheral lymphadenopathy   Chest: Symmetric expansion, nonlabored breathing  Cardiovascular: Regular with palpable distal pulses   Abdomen: Soft, non tender.   Extremities: No edema.  Neurological: 5/5 motor x4.        Laboratory:  WBC: 4.2, done on 6/11/2024.  HGB: 9.5, done on 6/11/2024.  PLT: 181, done on 6/11/2024.           Lab Results   Component Value Date    GLU 97 06/11/2024    BUN 16 06/11/2024    BUNCREA 14.2 06/11/2024    CREATSERUM 1.13 06/11/2024    ANIONGAP 11 06/11/2024    GFRNAA 95 07/18/2022    GFRAA 110 07/18/2022    CA 9.3 06/11/2024    OSMOCALC 273 (L) 06/11/2024    ALKPHO 50 06/11/2024    AST 89 (H) 06/11/2024    ALT 61 (H) 06/11/2024    BILT 0.3 06/11/2024    TP 8.1 06/11/2024    ALB 4.7 06/11/2024    GLOBULIN 3.4 06/11/2024     (L) 06/11/2024    K 3.8 06/11/2024     06/11/2024    CO2 18.0 (L) 06/11/2024     CT CHEST+ABDOMEN+PELVIS(ALL CNTRST ONLY)(CPT=71260/86108)    Result Date: 6/11/2024  CONCLUSION:  1. There is a history of rectosigmoid adenocarcinoma with stable postoperative changes from a previous low anterior resection.  There is also a history of prostate adenocarcinoma.  No evidence of recurrent malignancy/metastases in the chest, abdomen or pelvis. 2. Stable mild circumferential wall thickening about the colocolonic anastomosis and mild surrounding fat stranding  consistent with chronic postradiation changes.  3. Stable hepatic steatosis. 4. Numerous subcentimeter hypodense splenic lesions are too small to characterize but are stable dating back to July 2022 consistent with benign and/or indolent lesions. 5. Bilateral lower lobe pulmonary micronodules are unchanged over a greater than 3 year time interval consistent with benign nodules. 6. Multi-vessel coronary atherosclerosis. 7. Lesser incidental findings as above.    Dictated by (CST): Abiodun Lewis MD on 6/11/2024 at 1:31 PM     Finalized by (CST): Abiodun Lewis MD on 6/11/2024 at 1:51 PM           Impression and Plan:  73 year old with a family history of BRCA1 germ line mutation, who follows with medical oncology regarding clinically localized high risk prostate cancer who was treated with ADT, abiraterone, EBRT from May 2021 through September 2023.  He also carries a diagnosis of stage I ([id number]) adenocarcinoma of the rectosigmoid colon and underwent robotic assisted LAR 7/2/21.  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  PSA remains undetectable.  We will continue to follow with surveillance PSAs now that he is off therapy.  Per documentation, Genetic referral has been previously placed.    Iron deficiency anemia.  Due to radiation proctitis, his iron is low and he is anemic, we will plan for 1 g iron dextran.  I also encouraged the patient to start maintenance oral iron 3 times weekly in order to prevent nadirs to his iron levels given continued radiation proctitis.    Stage I adenocarcinoma of the rectosigmoid colon.  No further imaging indicated.    Follow-up in 3 months with PSA check and assessment of iron indices.    40 minutes were spent in patient discussion, coordination of care, record review, lab review, imaging review. The diagnosis, prognosis, and general treatment was explained and all questions answered.      Nathan Laguna DO  [facility]  [facility]

## 2024-07-02 ENCOUNTER — OFFICE VISIT (OUTPATIENT)
Dept: HEMATOLOGY/ONCOLOGY | Facility: HOSPITAL | Age: 74
End: 2024-07-02
Attending: STUDENT IN AN ORGANIZED HEALTH CARE EDUCATION/TRAINING PROGRAM
Payer: MEDICARE

## 2024-07-02 ENCOUNTER — NURSE TRIAGE (OUTPATIENT)
Dept: INTERNAL MEDICINE CLINIC | Facility: CLINIC | Age: 74
End: 2024-07-02

## 2024-07-02 VITALS
BODY MASS INDEX: 21 KG/M2 | TEMPERATURE: 98 F | OXYGEN SATURATION: 100 % | WEIGHT: 140 LBS | DIASTOLIC BLOOD PRESSURE: 53 MMHG | HEART RATE: 91 BPM | SYSTOLIC BLOOD PRESSURE: 92 MMHG | RESPIRATION RATE: 18 BRPM

## 2024-07-02 DIAGNOSIS — D50.0 IRON DEFICIENCY ANEMIA DUE TO CHRONIC BLOOD LOSS: Primary | ICD-10-CM

## 2024-07-02 DIAGNOSIS — C61 PROSTATE CANCER (HCC): ICD-10-CM

## 2024-07-02 PROCEDURE — 96365 THER/PROPH/DIAG IV INF INIT: CPT

## 2024-07-02 NOTE — PROGRESS NOTES
Pt here for Iron Dextran . Lloyd states that he's been feeling lightheaded for at least 3 weeks. weeks. He is aware that his blood pressure is on the low side.  He states that he will talk to his primary physician regarding it as he is on blood pressure medication. No test dose needed as he had Iron Dextran in 2023     Ordering Provider: Luz Elena     Pt tolerated infusion without difficulty or complaint. Reviewed next apt date/time: 9/13 labs and follow up with Dr. Laguna      Education Record  Learner:  Patient  Disease / Diagnosis: Anemia  Barriers / Limitations:  None  Method:  Brief focused and Discussion  General Topics:  Plan of care reviewed  Outcome:  Shows understanding

## 2024-07-02 NOTE — TELEPHONE ENCOUNTER
Pt is calling stating that would like for dr. Garcia to know that blood pressure has been low. Pt mention that he is doing iron transfusion and today before transfusion b/p was 88/53 and after 91/55. Pt is taking medication and would like to know what would dr. Garcia would suggest. Pt was told that doctor is on vacation and would be back until next week.      Please call and advise

## 2024-07-02 NOTE — TELEPHONE ENCOUNTER
Consulted with Dr. Desouza who advised to cut his blood pressure medication in half and monitor his blood pressures to bring to Dr. Garcia on a follow-up visit. If the patient has severe lightheadednesss or passes out go to the ED.

## 2024-07-02 NOTE — TELEPHONE ENCOUNTER
Spoke with Lloyd stated he has lower blood pressure of 88/53 this afternoon but after his iron infusion it is 91/55. Patient reported  has intermittent mild lightheadedness with position changes and ambulation.Patient's normal blood pressure 110/68. Patient denied chest pain or shortness of breath. Patient requesting to stop his blood pressure medication amLODIPine Besylate-Valsartan  MG Oral Tab.      Disposition: ED  but the patient adamantly declined. Patient wants to wait until Dr. Garcia returns from her vacation. RN informed the patient there can be negative consequences with his health delaying care.     RN consult with  who is covering for Dr. Desouza.         Reason for Disposition   Fall in systolic BP > 20 mm Hg from normal and feeling dizzy, lightheaded, or weak    Protocols used: Low Blood Pressure-A-OH

## 2024-07-02 NOTE — TELEPHONE ENCOUNTER
Spoke with Lloyd informed by Dr. Desouza to take half dose of amLODIPine Besylate-Valsartan  MG Oral Tab by splitting in half and keep a blood pressure log to bring to follow-up appointment. RN scheduled with Dr. Garcia an appointment on 7/12/24 at 10 AM. Patient advised to bring his blood pressure log for Dr. Garcia's review. Patient voiced understansing if he has worsening lightheadeness she will call 911. Patient voiced understanding.

## 2024-07-12 ENCOUNTER — OFFICE VISIT (OUTPATIENT)
Dept: INTERNAL MEDICINE CLINIC | Facility: CLINIC | Age: 74
End: 2024-07-12
Payer: MEDICARE

## 2024-07-12 VITALS
OXYGEN SATURATION: 100 % | HEART RATE: 88 BPM | WEIGHT: 140 LBS | BODY MASS INDEX: 21.22 KG/M2 | HEIGHT: 68 IN | TEMPERATURE: 98 F | SYSTOLIC BLOOD PRESSURE: 90 MMHG | DIASTOLIC BLOOD PRESSURE: 58 MMHG

## 2024-07-12 DIAGNOSIS — I10 ESSENTIAL HYPERTENSION: ICD-10-CM

## 2024-07-12 DIAGNOSIS — D50.0 IRON DEFICIENCY ANEMIA DUE TO CHRONIC BLOOD LOSS: ICD-10-CM

## 2024-07-12 DIAGNOSIS — K62.7 RADIATION INDUCED PROCTITIS: ICD-10-CM

## 2024-07-12 DIAGNOSIS — E86.1 HYPOTENSION DUE TO HYPOVOLEMIA: Primary | ICD-10-CM

## 2024-07-12 PROCEDURE — 99215 OFFICE O/P EST HI 40 MIN: CPT | Performed by: FAMILY MEDICINE

## 2024-07-17 NOTE — PROGRESS NOTES
HPI:     Chief Complaint   Patient presents with    Blood Pressure    Fall       Lloyd Bui is a 73 year old male presenting for:    For past 1 month having hypotension up to 90/50's and acute anemia.   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  Seeing Dr. Laguna (onc) who started iron infusions.  No other overt bleeding  Due to the hypotension has fallen 2 times but no syncope  Feels tired and weak.  No CP/SOB  Has been taking only half of his blood pressure medication as advised by the covering provider however notes that despite this its still low      Results for orders placed or performed during the hospital encounter of 06/11/24   POCT CREATININE   Result Value Ref Range    ISTAT Creatinine 1.30 0.70 - 1.30 mg/dL    eGFR-Cr 58 (L) >=60 mL/min/1.73m2       Labs:   Lab Results   Component Value Date/Time    A1C 5.0 06/21/2021 01:52 PM      Lab Results   Component Value Date/Time    CHOLEST 187 12/12/2023 01:48 PM    HDL 66 (H) 12/12/2023 01:48 PM    TRIG 202 (H) 12/12/2023 01:48 PM    LDL 87 12/12/2023 01:48 PM    NONHDLC 121 12/12/2023 01:48 PM       Lab Results   Component Value Date/Time    GLU 97 06/11/2024 11:05 AM     (L) 06/11/2024 11:05 AM    K 3.8 06/11/2024 11:05 AM     06/11/2024 11:05 AM    CO2 18.0 (L) 06/11/2024 11:05 AM    CREATSERUM 1.13 06/11/2024 11:05 AM    CA 9.3 06/11/2024 11:05 AM    ALB 4.7 06/11/2024 11:05 AM    TP 8.1 06/11/2024 11:05 AM    ALKPHO 50 06/11/2024 11:05 AM    AST 89 (H) 06/11/2024 11:05 AM    ALT 61 (H) 06/11/2024 11:05 AM    BILT 0.3 06/11/2024 11:05 AM    TSH 2.410 01/20/2021 03:31 PM          Medications:  Current Outpatient Medications   Medication Sig Dispense Refill    Glucosamine 500 MG Oral Cap Take by mouth.      amLODIPine Besylate-Valsartan  MG Oral Tab Take 1 tablet by mouth  daily. (Patient taking differently: Take 1 tablet by mouth daily. Patient taking half a tablet daily) 90 tablet 1    pantoprazole 20 MG Oral Tab EC Take 1 tablet (20 mg total) by mouth daily. 90 tablet 1    melatonin 5 MG Oral Cap Take 1 capsule (5 mg total) by mouth nightly.        Past Medical History:    Anemia    Cirrhosis (HCC)    Elevated PSA, less than 10 ng/ml    Essential hypertension    Prostate cancer (HCC)    Rectosigmoid cancer (HCC)    Visual impairment    reading glasses         Past Surgical History:   Procedure Laterality Date    Colonoscopy  04/14/2022    Colonoscopy  06/09/2021    Upper gi endoscopy,exam  05/19/2021     No Known Allergies   Social History:  Social History     Socioeconomic History    Marital status:    Tobacco Use    Smoking status: Never    Smokeless tobacco: Never   Vaping Use    Vaping status: Never Used   Substance and Sexual Activity    Alcohol use: Yes     Alcohol/week: 14.0 standard drinks of alcohol     Types: 14 Shots of liquor per week     Comment: 2 drinks of whisky daily    Drug use: Never      Family History:  Family History   Problem Relation Age of Onset    Cancer Father         esophageal Ca    Cancer Sister     Breast Cancer Sister         +BRCA          REVIEW OF SYSTEMS:   Review of Systems   Constitutional:  Positive for fatigue. Negative for chills and fever.   Respiratory:  Negative for cough and shortness of breath.    Cardiovascular:  Negative for chest pain, palpitations and leg swelling.   Gastrointestinal:  Negative for abdominal pain, constipation, diarrhea and vomiting.   Neurological:  Negative for headaches.            PHYSICAL EXAM:   BP 90/58   Pulse 88   Temp 97.6 °F (36.4 °C)   Ht 5' 8\" (1.727 m)   Wt 140 lb (63.5 kg)   SpO2 100%   BMI 21.29 kg/m²  Estimated body mass index is 21.29 kg/m² as calculated from the following:    Height as of this encounter: 5' 8\" (1.727 m).    Weight as of this encounter: 140 lb (63.5 kg).     Wt  Readings from Last 3 Encounters:   07/12/24 140 lb (63.5 kg)   07/02/24 140 lb (63.5 kg)   06/14/24 147 lb (66.7 kg)       Physical Exam  Vitals reviewed.   Constitutional:       General: He is not in acute distress.     Appearance: He is well-developed.      Comments: On wheelchair   Cardiovascular:      Rate and Rhythm: Normal rate and regular rhythm.      Heart sounds: Normal heart sounds. No murmur heard.  Pulmonary:      Effort: Pulmonary effort is normal. No respiratory distress.      Breath sounds: Normal breath sounds.   Abdominal:      General: Bowel sounds are normal. There is no distension.      Palpations: Abdomen is soft.      Tenderness: There is no abdominal tenderness.   Musculoskeletal:      Right lower leg: No edema.      Left lower leg: No edema.   Neurological:      General: No focal deficit present.             ASSESSMENT AND PLAN:   Patient is a 73 year old male who presents primarily presents for:    Diagnoses and all orders for this visit:    Hypotension due to hypovolemia    Essential hypertension    Radiation induced proctitis    Iron deficiency anemia due to chronic blood loss       -suspect hypotension to be due to volume depletion from acute anemia while also being on HTN medication  -advised to STOP HTN medication completely  -continue f/u with GI and onc inregards to his chronic rectal bleeding s/p radiation  -continue iron infusions  -advised to monitor daily BP for next 1-2weeks and maintain log and to call our office with readings  -discussed with pt that expect once anemia resolves that his BP will normalized and it may then start being hypertensive again and require medication to be resumed at same or lower dose however for now will hold. Pt needs to monitor BP regularly and notify us once its starting to be high again as well  -PT reports not willing to check it daily but will try to do it 2-3x/wk. Re-advised to attempt daily.    Return in about 1 month (around 8/12/2024), or if  symptoms worsen or fail to improve.  Patient indicates understanding of the above recommendations and agrees to the above plan.  Reasurrance and education provided. All questions answered.  Notified to call with any questions, complications, allergies, or worsening or changing symptoms as well as any side effects or complications from the treatments .  Red flags/ ER precautions discussed.    Spent 40 minutes including chart review, reviewing appropriate medical history, evaluating patient, discussing treatment options, counseling and education (diet and exercise), ordering appropriate diagnostic tests and completing documentation.        Meds & Refills for this Visit:  Requested Prescriptions      No prescriptions requested or ordered in this encounter       No orders of the defined types were placed in this encounter.      Imaging & Consults:  None    Health Maintenance:  Health Maintenance   Topic Date Due    Pneumococcal Vaccine: 65+ Years (1 of 2 - PCV) Never done    Zoster Vaccines (1 of 2) Never done    COVID-19 Vaccine (4 - 2023-24 season) 09/01/2023    Influenza Vaccine (1) 10/01/2024    Colorectal Cancer Screening  04/13/2025    Annual Physical  05/15/2025    PSA  06/11/2026    Annual Depression Screening  Completed    Fall Risk Screening (Annual)  Completed         Iesha De La Rosa MD

## 2024-09-12 DIAGNOSIS — C61 PROSTATE CANCER (HCC): ICD-10-CM

## 2024-09-12 DIAGNOSIS — D50.0 IRON DEFICIENCY ANEMIA DUE TO CHRONIC BLOOD LOSS: Primary | ICD-10-CM

## 2024-09-13 ENCOUNTER — NURSE ONLY (OUTPATIENT)
Dept: HEMATOLOGY/ONCOLOGY | Facility: HOSPITAL | Age: 74
End: 2024-09-13
Attending: STUDENT IN AN ORGANIZED HEALTH CARE EDUCATION/TRAINING PROGRAM
Payer: MEDICARE

## 2024-09-13 VITALS
DIASTOLIC BLOOD PRESSURE: 91 MMHG | OXYGEN SATURATION: 99 % | HEIGHT: 68 IN | WEIGHT: 140.63 LBS | RESPIRATION RATE: 18 BRPM | HEART RATE: 105 BPM | SYSTOLIC BLOOD PRESSURE: 173 MMHG | BODY MASS INDEX: 21.31 KG/M2 | TEMPERATURE: 98 F

## 2024-09-13 DIAGNOSIS — C61 PROSTATE CANCER (HCC): ICD-10-CM

## 2024-09-13 DIAGNOSIS — C61 PROSTATE CANCER (HCC): Primary | ICD-10-CM

## 2024-09-13 DIAGNOSIS — C19 RECTOSIGMOID CANCER (HCC): ICD-10-CM

## 2024-09-13 DIAGNOSIS — D50.0 IRON DEFICIENCY ANEMIA DUE TO CHRONIC BLOOD LOSS: ICD-10-CM

## 2024-09-13 DIAGNOSIS — E61.1 IRON DEFICIENCY: ICD-10-CM

## 2024-09-13 LAB
ALBUMIN SERPL-MCNC: 4.2 G/DL (ref 3.2–4.8)
ALBUMIN/GLOB SERPL: 1.2 {RATIO} (ref 1–2)
ALP LIVER SERPL-CCNC: 105 U/L
ALT SERPL-CCNC: 50 U/L
ANION GAP SERPL CALC-SCNC: 10 MMOL/L (ref 0–18)
AST SERPL-CCNC: 103 U/L (ref ?–34)
BASOPHILS # BLD AUTO: 0.04 X10(3) UL (ref 0–0.2)
BASOPHILS NFR BLD AUTO: 1 %
BILIRUB SERPL-MCNC: 0.3 MG/DL (ref 0.2–1.1)
BUN BLD-MCNC: 6 MG/DL (ref 9–23)
BUN/CREAT SERPL: 7.5 (ref 10–20)
CALCIUM BLD-MCNC: 9.2 MG/DL (ref 8.7–10.4)
CHLORIDE SERPL-SCNC: 107 MMOL/L (ref 98–112)
CO2 SERPL-SCNC: 24 MMOL/L (ref 21–32)
CREAT BLD-MCNC: 0.8 MG/DL
DEPRECATED HBV CORE AB SER IA-ACNC: 117.7 NG/ML
DEPRECATED RDW RBC AUTO: 51.4 FL (ref 35.1–46.3)
EGFRCR SERPLBLD CKD-EPI 2021: 93 ML/MIN/1.73M2 (ref 60–?)
EOSINOPHIL # BLD AUTO: 0.09 X10(3) UL (ref 0–0.7)
EOSINOPHIL NFR BLD AUTO: 2.3 %
ERYTHROCYTE [DISTWIDTH] IN BLOOD BY AUTOMATED COUNT: 14.1 % (ref 11–15)
GLOBULIN PLAS-MCNC: 3.6 G/DL (ref 2–3.5)
GLUCOSE BLD-MCNC: 147 MG/DL (ref 70–99)
HCT VFR BLD AUTO: 33 %
HGB BLD-MCNC: 11.4 G/DL
IMM GRANULOCYTES # BLD AUTO: 0.02 X10(3) UL (ref 0–1)
IMM GRANULOCYTES NFR BLD: 0.5 %
IRON SATN MFR SERPL: 28 %
IRON SERPL-MCNC: 62 UG/DL
LYMPHOCYTES # BLD AUTO: 0.99 X10(3) UL (ref 1–4)
LYMPHOCYTES NFR BLD AUTO: 24.9 %
MCH RBC QN AUTO: 33.9 PG (ref 26–34)
MCHC RBC AUTO-ENTMCNC: 34.5 G/DL (ref 31–37)
MCV RBC AUTO: 98.2 FL
MONOCYTES # BLD AUTO: 0.38 X10(3) UL (ref 0.1–1)
MONOCYTES NFR BLD AUTO: 9.6 %
NEUTROPHILS # BLD AUTO: 2.45 X10 (3) UL (ref 1.5–7.7)
NEUTROPHILS # BLD AUTO: 2.45 X10(3) UL (ref 1.5–7.7)
NEUTROPHILS NFR BLD AUTO: 61.7 %
OSMOLALITY SERPL CALC.SUM OF ELEC: 292 MOSM/KG (ref 275–295)
PLATELET # BLD AUTO: 178 10(3)UL (ref 150–450)
POTASSIUM SERPL-SCNC: 4.1 MMOL/L (ref 3.5–5.1)
PROT SERPL-MCNC: 7.8 G/DL (ref 5.7–8.2)
PSA SERPL-MCNC: <0.04 NG/ML (ref ?–4)
RBC # BLD AUTO: 3.36 X10(6)UL
SODIUM SERPL-SCNC: 141 MMOL/L (ref 136–145)
TESTOST SERPL-MCNC: 17.25 NG/DL
TIBC SERPL-MCNC: 218 UG/DL (ref 250–425)
TRANSFERRIN SERPL-MCNC: 146 MG/DL (ref 215–365)
WBC # BLD AUTO: 4 X10(3) UL (ref 4–11)

## 2024-09-13 PROCEDURE — 84466 ASSAY OF TRANSFERRIN: CPT

## 2024-09-13 PROCEDURE — 85025 COMPLETE CBC W/AUTO DIFF WBC: CPT

## 2024-09-13 PROCEDURE — 82728 ASSAY OF FERRITIN: CPT

## 2024-09-13 PROCEDURE — 84403 ASSAY OF TOTAL TESTOSTERONE: CPT

## 2024-09-13 PROCEDURE — 99214 OFFICE O/P EST MOD 30 MIN: CPT | Performed by: STUDENT IN AN ORGANIZED HEALTH CARE EDUCATION/TRAINING PROGRAM

## 2024-09-13 PROCEDURE — 83540 ASSAY OF IRON: CPT

## 2024-09-13 PROCEDURE — 84153 ASSAY OF PSA TOTAL: CPT

## 2024-09-13 PROCEDURE — 36415 COLL VENOUS BLD VENIPUNCTURE: CPT

## 2024-09-13 PROCEDURE — 80053 COMPREHEN METABOLIC PANEL: CPT

## 2024-09-16 NOTE — PROGRESS NOTES
Cancer Center Progress Note    Patient Name: Lloyd Bui   YOB: 1950   Medical Record Number: [id number]     Chief Complaint:  Prostate cancer rectosigmoid cancer    History of Present Illness:  Cancer history:  74 year old with a family history of BRCA1 germ line mutation, who follows with medical oncology regarding clinically localized high risk prostate cancer who was treated with ADT, abiraterone, EBRT from May 2021 through September 2023.  He also carries a diagnosis of stage I ([id number]) adenocarcinoma of the rectosigmoid colon and underwent robotic assisted LAR 7/2/21.  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      Further prostate cancer treatment history:  May 2021 met with medical oncology and radiation oncology started ADT 5/13/2021 Baseline PSA 10.4   6/11/2021 PSA 1.5 continued ADT  9/3/2021 PSA 0.35 continue ADT  10/18/2021 orders placed for abiraterone/prednisone based on updated results from [facility]  12/3/21 PSA 0.02 on ADT and abiraterone/prednisone  1/14/2022 PSA 0.01 on ADT and abiraterone/prednisone  3/4/2022 PSA 0.01 on ADT and abiraterone/prednisone  6/6/2022 PSA 0.01 on ADT and abiraterone/prednisone  7/18/2022 PSA 0.01 on ADT and abiraterone/prednisone  9/8/2022 PSA 0.01 on ADT and abiraterone/prednisone  12/13/2022 PSA 0.01 on ADT and abiraterone/prednisone  3/14/2023 PSA 0.01 on ADT and abiraterone/prednisone  6/13/2023 PSA 0.01 on ADT and abiraterone/prednisone  9/12/2023 PSA 0.01 finishing ADT and abiraterone/prednisone  12/12/23 PSA 0 off treatment  3/12/24 PSA 0 off treatment  6/14/24 PSA undetectable   9/13/24: PSA undetectable      Interval history:  Generally stable.  Feels well but he does continue to have intermittent GI bleeding which is frustrating.    Performance Status:  ECOG 0  Past Medical History:  Past Medical History:    Anemia    Cirrhosis (HCC)    Elevated PSA, less than 10 ng/ml     Essential hypertension    Prostate cancer (HCC)    Rectosigmoid cancer (HCC)    Visual impairment    reading glasses       Past Surgical History:  Past Surgical History:   Procedure Laterality Date    Colonoscopy  04/14/2022    Colonoscopy  06/09/2021    Upper gi endoscopy,exam  05/19/2021       Family History:  Family History   Problem Relation Age of Onset    Cancer Father         esophageal Ca    Cancer Sister     Breast Cancer Sister         +BRCA       Social History:  Social History     Socioeconomic History    Marital status:    Tobacco Use    Smoking status: Never    Smokeless tobacco: Never   Vaping Use    Vaping status: Never Used   Substance and Sexual Activity    Alcohol use: Yes     Alcohol/week: 14.0 standard drinks of alcohol     Types: 14 Shots of liquor per week     Comment: 2 drinks of whisky daily    Drug use: Never         Current Medications:    Current Outpatient Medications:     Glucosamine 500 MG Oral Cap, Take by mouth., Disp: , Rfl:     pantoprazole 20 MG Oral Tab EC, Take 1 tablet (20 mg total) by mouth daily., Disp: 90 tablet, Rfl: 1    melatonin 5 MG Oral Cap, Take 1 capsule (5 mg total) by mouth nightly., Disp: , Rfl:     amLODIPine Besylate-Valsartan  MG Oral Tab, Take 1 tablet by mouth daily. (Patient taking differently: Take 1 tablet by mouth daily. Patient taking half a tablet daily), Disp: 90 tablet, Rfl: 1    Current Outpatient Medications on File Prior to Visit   Medication Sig Dispense Refill    Glucosamine 500 MG Oral Cap Take by mouth.      pantoprazole 20 MG Oral Tab EC Take 1 tablet (20 mg total) by mouth daily. 90 tablet 1    melatonin 5 MG Oral Cap Take 1 capsule (5 mg total) by mouth nightly.      amLODIPine Besylate-Valsartan  MG Oral Tab Take 1 tablet by mouth daily. (Patient taking differently: Take 1 tablet by mouth daily. Patient taking half a tablet daily) 90 tablet 1     No current facility-administered medications on file prior to visit.          Allergies:  No Known Allergies     Review of Systems:  All other systems reviewed and negative x12    Vital Signs:  BP (!) 173/91 (BP Location: Left arm, Patient Position: Sitting, Cuff Size: adult)   Pulse 105   Temp 97.9 °F (36.6 °C) (Oral)   Resp 18   Ht 1.727 m (5' 8\")   Wt 63.8 kg (140 lb 9.6 oz)   SpO2 99%   BMI 21.38 kg/m²     Physical Examination:  General: Patient is alert and oriented x 3, not in acute distress.  Psych:  Mood and affect appropriate  HEENT: EOMs intact. PERRL. Oropharynx is clear.   Neck: No JVD. No palpable lymphadenopathy. Neck is supple.  Lymphatics: There is no palpable peripheral lymphadenopathy   Chest: Symmetric expansion, nonlabored breathing  Cardiovascular: Regular with palpable distal pulses   Abdomen: Soft, non tender.   Extremities: No edema.  Neurological: 5/5 motor x4.        Laboratory:  WBC: 4, done on 9/13/2024.  HGB: 11.4, done on 9/13/2024.  PLT: 178, done on 9/13/2024.           Lab Results   Component Value Date     (H) 09/13/2024    BUN 6 (L) 09/13/2024    BUNCREA 7.5 (L) 09/13/2024    CREATSERUM 0.80 09/13/2024    ANIONGAP 10 09/13/2024    GFRNAA 95 07/18/2022    GFRAA 110 07/18/2022    CA 9.2 09/13/2024    OSMOCALC 292 09/13/2024    ALKPHO 105 09/13/2024     (H) 09/13/2024    ALT 50 (H) 09/13/2024    BILT 0.3 09/13/2024    TP 7.8 09/13/2024    ALB 4.2 09/13/2024    GLOBULIN 3.6 (H) 09/13/2024     09/13/2024    K 4.1 09/13/2024     09/13/2024    CO2 24.0 09/13/2024           Impression and Plan:  74 year old with a family history of BRCA1 germ line mutation, who follows with medical oncology regarding clinically localized high risk prostate cancer who was treated with ADT, abiraterone, EBRT from May 2021 through September 2023.  He also carries a diagnosis of stage I ([id number]) adenocarcinoma of the rectosigmoid colon and underwent robotic assisted LAR 7/2/21.  He also has a history of iron deficiency anemia secondary to  radiation proctitis and intermittent GI bleeding    Prostate cancer.  PSA remains undetectable.  We will continue to follow with surveillance PSAs now that he is off therapy.  Per documentation, Genetic referral has been previously placed.    Iron deficiency anemia.  Stable after iron dextran.  He is also taking oral iron as maintenance.    Stage I adenocarcinoma of the rectosigmoid colon.  No further imaging indicated.    Follow-up in 3 months with PSA check and assessment of iron indices.    30 minutes were spent in patient discussion, coordination of care, record review, lab review, imaging review. The diagnosis, prognosis, and general treatment was explained and all questions answered.     Nathan Laguna DO  [facility]  [place]

## 2024-11-18 ENCOUNTER — OFFICE VISIT (OUTPATIENT)
Dept: INTERNAL MEDICINE CLINIC | Facility: CLINIC | Age: 74
End: 2024-11-18
Payer: MEDICARE

## 2024-11-18 VITALS
WEIGHT: 140 LBS | SYSTOLIC BLOOD PRESSURE: 136 MMHG | OXYGEN SATURATION: 99 % | TEMPERATURE: 98 F | HEART RATE: 100 BPM | DIASTOLIC BLOOD PRESSURE: 78 MMHG | BODY MASS INDEX: 21.22 KG/M2 | HEIGHT: 68 IN

## 2024-11-18 DIAGNOSIS — Z85.048 HISTORY OF CANCER OF RECTOSIGMOID JUNCTION: ICD-10-CM

## 2024-11-18 DIAGNOSIS — K62.7 RADIATION INDUCED PROCTITIS: ICD-10-CM

## 2024-11-18 DIAGNOSIS — K74.60 CIRRHOSIS OF LIVER WITHOUT ASCITES, UNSPECIFIED HEPATIC CIRRHOSIS TYPE (HCC): ICD-10-CM

## 2024-11-18 DIAGNOSIS — I10 ESSENTIAL HYPERTENSION: Primary | ICD-10-CM

## 2024-11-18 DIAGNOSIS — Z12.11 COLON CANCER SCREENING: ICD-10-CM

## 2024-11-18 DIAGNOSIS — C61 PROSTATE CANCER (HCC): ICD-10-CM

## 2024-11-18 DIAGNOSIS — K21.9 GASTROESOPHAGEAL REFLUX DISEASE WITHOUT ESOPHAGITIS: ICD-10-CM

## 2024-11-18 DIAGNOSIS — M17.0 PRIMARY OSTEOARTHRITIS OF BOTH KNEES: ICD-10-CM

## 2024-11-18 DIAGNOSIS — Z00.00 HEALTHCARE MAINTENANCE: ICD-10-CM

## 2024-11-18 RX ORDER — PANTOPRAZOLE SODIUM 20 MG/1
20 TABLET, DELAYED RELEASE ORAL DAILY
Qty: 90 TABLET | Refills: 1 | Status: SHIPPED | OUTPATIENT
Start: 2024-11-18

## 2024-11-18 NOTE — PROGRESS NOTES
HPI:     No chief complaint on file.      Lloyd Bui is a 74 year old male presenting for:      Seeing heme, rad onc, and GI for prostate and rectosigmoid cancer. Radiation prostitis has never resolved as well as intermittent diarrhea bouts which limits his lifestyle    HTN-> asymptomatic; after having hypotension in 7/2024 medication was stopped. Pt did not send BP log and has remained off medication. Wishes to remain off medication and monitor it at home    OA knee -> stable    Has fallen twice in past 6mo.  Was sleepy when he fell. Not interested in PT      Results for orders placed or performed in visit on 09/13/24   CBC With Differential With Platelet    Collection Time: 09/13/24  1:41 PM   Result Value Ref Range    WBC 4.0 4.0 - 11.0 x10(3) uL    RBC 3.36 (L) 3.80 - 5.80 x10(6)uL    HGB 11.4 (L) 13.0 - 17.5 g/dL    HCT 33.0 (L) 39.0 - 53.0 %    MCV 98.2 80.0 - 100.0 fL    MCH 33.9 26.0 - 34.0 pg    MCHC 34.5 31.0 - 37.0 g/dL    RDW-SD 51.4 (H) 35.1 - 46.3 fL    RDW 14.1 11.0 - 15.0 %    .0 150.0 - 450.0 10(3)uL    Neutrophil Absolute Prelim 2.45 1.50 - 7.70 x10 (3) uL    Neutrophil Absolute 2.45 1.50 - 7.70 x10(3) uL    Lymphocyte Absolute 0.99 (L) 1.00 - 4.00 x10(3) uL    Monocyte Absolute 0.38 0.10 - 1.00 x10(3) uL    Eosinophil Absolute 0.09 0.00 - 0.70 x10(3) uL    Basophil Absolute 0.04 0.00 - 0.20 x10(3) uL    Immature Granulocyte Absolute 0.02 0.00 - 1.00 x10(3) uL    Neutrophil % 61.7 %    Lymphocyte % 24.9 %    Monocyte % 9.6 %    Eosinophil % 2.3 %    Basophil % 1.0 %    Immature Granulocyte % 0.5 %   Ferritin    Collection Time: 09/13/24  1:41 PM   Result Value Ref Range    Ferritin 117.7 50.0 - 336.0 ng/mL   Iron And Tibc    Collection Time: 09/13/24  1:41 PM   Result Value Ref Range    Iron 62 (L) 65 - 175 ug/dL    Transferrin 146 (L) 215 - 365 mg/dL    Total Iron Binding Capacity 218 (L) 250 - 425 ug/dL    % Saturation 28 20 - 50 %   PSA - DIAGNOSTIC [E]    Collection Time: 09/13/24   1:41 PM   Result Value Ref Range    Total PSA  <0.04 <=4.00 ng/mL   Testosterone Total    Collection Time: 09/13/24  1:41 PM   Result Value Ref Range    Testosterone 17.25 (L) 187..19 ng/dL ng/dL   COMP METABOLIC PANEL [E]    Collection Time: 09/13/24  1:41 PM   Result Value Ref Range    Glucose 147 (H) 70 - 99 mg/dL    Sodium 141 136 - 145 mmol/L    Potassium 4.1 3.5 - 5.1 mmol/L    Chloride 107 98 - 112 mmol/L    CO2 24.0 21.0 - 32.0 mmol/L    Anion Gap 10 0 - 18 mmol/L    BUN 6 (L) 9 - 23 mg/dL    Creatinine 0.80 0.70 - 1.30 mg/dL    BUN/CREA Ratio 7.5 (L) 10.0 - 20.0    Calcium, Total 9.2 8.7 - 10.4 mg/dL    Calculated Osmolality 292 275 - 295 mOsm/kg    eGFR-Cr 93 >=60 mL/min/1.73m2    ALT 50 (H) 10 - 49 U/L     (H) <34 U/L    Alkaline Phosphatase 105 45 - 117 U/L    Bilirubin, Total 0.3 0.2 - 1.1 mg/dL    Total Protein 7.8 5.7 - 8.2 g/dL    Albumin 4.2 3.2 - 4.8 g/dL    Globulin  3.6 (H) 2.0 - 3.5 g/dL    A/G Ratio 1.2 1.0 - 2.0    Patient Fasting for CMP? Patient not present        Labs:   Lab Results   Component Value Date/Time    A1C 5.0 06/21/2021 01:52 PM      Lab Results   Component Value Date/Time    CHOLEST 187 12/12/2023 01:48 PM    HDL 66 (H) 12/12/2023 01:48 PM    TRIG 202 (H) 12/12/2023 01:48 PM    LDL 87 12/12/2023 01:48 PM    NONHDLC 121 12/12/2023 01:48 PM       Lab Results   Component Value Date/Time     (H) 09/13/2024 01:41 PM     09/13/2024 01:41 PM    K 4.1 09/13/2024 01:41 PM     09/13/2024 01:41 PM    CO2 24.0 09/13/2024 01:41 PM    CREATSERUM 0.80 09/13/2024 01:41 PM    CA 9.2 09/13/2024 01:41 PM    ALB 4.2 09/13/2024 01:41 PM    TP 7.8 09/13/2024 01:41 PM    ALKPHO 105 09/13/2024 01:41 PM     (H) 09/13/2024 01:41 PM    ALT 50 (H) 09/13/2024 01:41 PM    BILT 0.3 09/13/2024 01:41 PM    TSH 2.410 01/20/2021 03:31 PM          Medications:  Current Outpatient Medications   Medication Sig Dispense Refill    pantoprazole 20 MG Oral Tab EC Take 1 tablet (20  mg total) by mouth daily. 90 tablet 1    Glucosamine 500 MG Oral Cap Take by mouth.      melatonin 5 MG Oral Cap Take 1 capsule (5 mg total) by mouth nightly.      amLODIPine Besylate-Valsartan  MG Oral Tab Take 1 tablet by mouth daily. (Patient not taking: Reported on 11/18/2024) 90 tablet 1      Past Medical History:    Anemia    Cirrhosis (HCC)    Elevated PSA, less than 10 ng/ml    Essential hypertension    Prostate cancer (HCC)    Rectosigmoid cancer (HCC)    Visual impairment    reading glasses         Past Surgical History:   Procedure Laterality Date    Colonoscopy  04/14/2022    Colonoscopy  06/09/2021    Upper gi endoscopy,exam  05/19/2021     No Known Allergies   Social History:  Social History     Socioeconomic History    Marital status:    Tobacco Use    Smoking status: Never    Smokeless tobacco: Never   Vaping Use    Vaping status: Never Used   Substance and Sexual Activity    Alcohol use: Yes     Alcohol/week: 14.0 standard drinks of alcohol     Types: 14 Shots of liquor per week     Comment: 2 drinks of whisky daily    Drug use: Never      Family History:  Family History   Problem Relation Age of Onset    Cancer Father         esophageal Ca    Cancer Sister     Breast Cancer Sister         +BRCA          REVIEW OF SYSTEMS:   Review of Systems   Constitutional:  Negative for fatigue and fever.   Respiratory:  Negative for cough and shortness of breath.    Cardiovascular:  Negative for chest pain, palpitations and leg swelling.   Gastrointestinal:  Positive for diarrhea. Negative for abdominal pain, constipation and vomiting.   Musculoskeletal:  Positive for arthralgias.   Neurological:  Negative for headaches.            PHYSICAL EXAM:   /78   Pulse 100   Temp 97.8 °F (36.6 °C)   Ht 5' 8\" (1.727 m)   Wt 140 lb (63.5 kg)   SpO2 99%   BMI 21.29 kg/m²  Estimated body mass index is 21.29 kg/m² as calculated from the following:    Height as of this encounter: 5' 8\" (1.727 m).     Weight as of this encounter: 140 lb (63.5 kg).     Wt Readings from Last 3 Encounters:   11/18/24 140 lb (63.5 kg)   09/13/24 140 lb 9.6 oz (63.8 kg)   07/12/24 140 lb (63.5 kg)       Physical Exam  Vitals reviewed.   Constitutional:       General: He is not in acute distress.     Appearance: He is well-developed.   Cardiovascular:      Rate and Rhythm: Normal rate and regular rhythm.      Heart sounds: Normal heart sounds. No murmur heard.  Pulmonary:      Effort: Pulmonary effort is normal. No respiratory distress.      Breath sounds: Normal breath sounds.   Abdominal:      General: Bowel sounds are normal. There is no distension.      Palpations: Abdomen is soft.      Tenderness: There is no abdominal tenderness.   Musculoskeletal:      Cervical back: Normal range of motion.      Right lower leg: No edema.      Left lower leg: No edema.   Neurological:      General: No focal deficit present.             ASSESSMENT AND PLAN:   Patient is a 74 year old male who presents primarily presents for:    Diagnoses and all orders for this visit:    Essential hypertension  -stable; controlled off meds  -borderline elevated today but reports normal at home. Will thus continue off meds  -watch salt intake, regular exercise, DASH/heart healthy eating  -monitor home BP regularly and maintain log; if elevated reading >160/100 notify Md.    Primary osteoarthritis of both knees  -supportive care PRN    Gastroesophageal reflux disease without esophagitis  -     GASTRO - INTERNAL  -     pantoprazole 20 MG Oral Tab EC; Take 1 tablet (20 mg total) by mouth daily.    Cirrhosis of liver without ascites, unspecified hepatic cirrhosis type (HCC)  Prostate cancer (HCC)  History of cancer of rectosigmoid junction  Radiation induced proctitis  -continue f/u with heme-onc, rad onc, GI, urology, hepatology  -CPM per their specialists       Colon cancer screening  -     GASTRO - INTERNAL    Healthcare maintenance  -     Lipid Panel; Future  -      Urinalysis, Routine; Future         Return in about 6 months (around 5/18/2025), or if symptoms worsen or fail to improve, for Blood Pressure follow-up, physical.  Patient indicates understanding of the above recommendations and agrees to the above plan.  Reasurrance and education provided. All questions answered.  Notified to call with any questions, complications, allergies, or worsening or changing symptoms as well as any side effects or complications from the treatments .  Red flags/ ER precautions discussed.    Spent 30 minutes including chart review, reviewing appropriate medical history, evaluating patient, discussing treatment options, counseling and education (diet and exercise), ordering appropriate diagnostic tests and completing documentation.        Meds & Refills for this Visit:  Requested Prescriptions     Signed Prescriptions Disp Refills    pantoprazole 20 MG Oral Tab EC 90 tablet 1     Sig: Take 1 tablet (20 mg total) by mouth daily.       Orders Placed This Encounter   Procedures    Lipid Panel    Urinalysis, Routine       Imaging & Consults:  GASTRO - INTERNAL    Health Maintenance:  Health Maintenance   Topic Date Due    COVID-19 Vaccine (4 - 2023-24 season) 09/01/2023    Influenza Vaccine (1) Never done    Pneumococcal Vaccine: 65+ Years (1 - PCV) 05/15/2024 (Originally 8/27/1956)    Zoster Vaccines (1 of 2) 05/15/2024 (Originally 8/27/1969)    Annual Physical  05/15/2024    Colorectal Cancer Screening  04/13/2025    PSA  09/12/2025    Annual Depression Screening  Completed    Fall Risk Screening (Annual)  Completed         Iesha De La Rosa MD

## 2024-12-12 DIAGNOSIS — D50.0 IRON DEFICIENCY ANEMIA DUE TO CHRONIC BLOOD LOSS: ICD-10-CM

## 2024-12-12 DIAGNOSIS — E61.1 IRON DEFICIENCY: ICD-10-CM

## 2024-12-12 DIAGNOSIS — C61 PROSTATE CANCER (HCC): Primary | ICD-10-CM

## 2024-12-13 ENCOUNTER — LAB ENCOUNTER (OUTPATIENT)
Dept: LAB | Facility: HOSPITAL | Age: 74
End: 2024-12-13
Attending: PHYSICIAN ASSISTANT
Payer: MEDICARE

## 2024-12-13 ENCOUNTER — APPOINTMENT (OUTPATIENT)
Dept: HEMATOLOGY/ONCOLOGY | Facility: HOSPITAL | Age: 74
End: 2024-12-13
Attending: STUDENT IN AN ORGANIZED HEALTH CARE EDUCATION/TRAINING PROGRAM
Payer: MEDICARE

## 2024-12-13 VITALS
HEART RATE: 121 BPM | HEIGHT: 68 IN | DIASTOLIC BLOOD PRESSURE: 98 MMHG | TEMPERATURE: 98 F | RESPIRATION RATE: 18 BRPM | OXYGEN SATURATION: 98 % | SYSTOLIC BLOOD PRESSURE: 175 MMHG | BODY MASS INDEX: 20.61 KG/M2 | WEIGHT: 136 LBS

## 2024-12-13 DIAGNOSIS — D50.0 IRON DEFICIENCY ANEMIA DUE TO CHRONIC BLOOD LOSS: ICD-10-CM

## 2024-12-13 DIAGNOSIS — R00.0 TACHYCARDIA: ICD-10-CM

## 2024-12-13 DIAGNOSIS — C61 PROSTATE CANCER (HCC): Primary | ICD-10-CM

## 2024-12-13 DIAGNOSIS — Z00.00 HEALTHCARE MAINTENANCE: ICD-10-CM

## 2024-12-13 DIAGNOSIS — Z85.048 HISTORY OF CANCER OF RECTOSIGMOID JUNCTION: ICD-10-CM

## 2024-12-13 DIAGNOSIS — I10 ESSENTIAL HYPERTENSION: ICD-10-CM

## 2024-12-13 DIAGNOSIS — C61 PROSTATE CANCER (HCC): ICD-10-CM

## 2024-12-13 DIAGNOSIS — E61.1 IRON DEFICIENCY: ICD-10-CM

## 2024-12-13 LAB
ALBUMIN SERPL-MCNC: 4.5 G/DL (ref 3.2–4.8)
ALBUMIN/GLOB SERPL: 1.2 {RATIO} (ref 1–2)
ALP LIVER SERPL-CCNC: 76 U/L
ALT SERPL-CCNC: 43 U/L
ANION GAP SERPL CALC-SCNC: 10 MMOL/L (ref 0–18)
AST SERPL-CCNC: 97 U/L (ref ?–34)
ATRIAL RATE: 104 BPM
BASOPHILS # BLD AUTO: 0.04 X10(3) UL (ref 0–0.2)
BASOPHILS NFR BLD AUTO: 1.2 %
BILIRUB SERPL-MCNC: 0.3 MG/DL (ref 0.2–1.1)
BILIRUB UR QL: NEGATIVE
BUN BLD-MCNC: 7 MG/DL (ref 9–23)
BUN/CREAT SERPL: 9.5 (ref 10–20)
CALCIUM BLD-MCNC: 9.3 MG/DL (ref 8.7–10.4)
CHLORIDE SERPL-SCNC: 107 MMOL/L (ref 98–112)
CHOLEST SERPL-MCNC: 223 MG/DL (ref ?–200)
CLARITY UR: CLEAR
CO2 SERPL-SCNC: 24 MMOL/L (ref 21–32)
COLOR UR: YELLOW
CREAT BLD-MCNC: 0.74 MG/DL
DEPRECATED HBV CORE AB SER IA-ACNC: 52 NG/ML
DEPRECATED RDW RBC AUTO: 54.3 FL (ref 35.1–46.3)
EGFRCR SERPLBLD CKD-EPI 2021: 96 ML/MIN/1.73M2 (ref 60–?)
EOSINOPHIL # BLD AUTO: 0.1 X10(3) UL (ref 0–0.7)
EOSINOPHIL NFR BLD AUTO: 3 %
ERYTHROCYTE [DISTWIDTH] IN BLOOD BY AUTOMATED COUNT: 14.8 % (ref 11–15)
GLOBULIN PLAS-MCNC: 3.7 G/DL (ref 2–3.5)
GLUCOSE BLD-MCNC: 108 MG/DL (ref 70–99)
GLUCOSE UR-MCNC: NORMAL MG/DL
HCT VFR BLD AUTO: 32.9 %
HDLC SERPL-MCNC: 96 MG/DL (ref 40–59)
HGB BLD-MCNC: 11.3 G/DL
HYALINE CASTS #/AREA URNS AUTO: PRESENT /LPF
IMM GRANULOCYTES # BLD AUTO: 0.02 X10(3) UL (ref 0–1)
IMM GRANULOCYTES NFR BLD: 0.6 %
IRON SATN MFR SERPL: 20 %
IRON SERPL-MCNC: 53 UG/DL
KETONES UR-MCNC: NEGATIVE MG/DL
LDLC SERPL CALC-MCNC: 104 MG/DL (ref ?–100)
LEUKOCYTE ESTERASE UR QL STRIP.AUTO: NEGATIVE
LYMPHOCYTES # BLD AUTO: 0.7 X10(3) UL (ref 1–4)
LYMPHOCYTES NFR BLD AUTO: 20.9 %
MCH RBC QN AUTO: 33.8 PG (ref 26–34)
MCHC RBC AUTO-ENTMCNC: 34.3 G/DL (ref 31–37)
MCV RBC AUTO: 98.5 FL
MONOCYTES # BLD AUTO: 0.5 X10(3) UL (ref 0.1–1)
MONOCYTES NFR BLD AUTO: 14.9 %
NEUTROPHILS # BLD AUTO: 1.99 X10 (3) UL (ref 1.5–7.7)
NEUTROPHILS # BLD AUTO: 1.99 X10(3) UL (ref 1.5–7.7)
NEUTROPHILS NFR BLD AUTO: 59.4 %
NITRITE UR QL STRIP.AUTO: NEGATIVE
NONHDLC SERPL-MCNC: 127 MG/DL (ref ?–130)
OSMOLALITY SERPL CALC.SUM OF ELEC: 291 MOSM/KG (ref 275–295)
P AXIS: 54 DEGREES
P-R INTERVAL: 168 MS
PH UR: 5.5 [PH] (ref 5–8)
PLATELET # BLD AUTO: 201 10(3)UL (ref 150–450)
POTASSIUM SERPL-SCNC: 4.4 MMOL/L (ref 3.5–5.1)
PROT SERPL-MCNC: 8.2 G/DL (ref 5.7–8.2)
PROT UR-MCNC: 100 MG/DL
PSA SERPL-MCNC: <0.04 NG/ML (ref ?–4)
Q-T INTERVAL: 398 MS
QRS DURATION: 96 MS
QTC CALCULATION (BEZET): 523 MS
R AXIS: 6 DEGREES
RBC # BLD AUTO: 3.34 X10(6)UL
SODIUM SERPL-SCNC: 141 MMOL/L (ref 136–145)
SP GR UR STRIP: 1.02 (ref 1–1.03)
T AXIS: 63 DEGREES
T4 FREE SERPL-MCNC: 0.9 NG/DL (ref 0.8–1.7)
TESTOST SERPL-MCNC: 15.92 NG/DL
TIBC SERPL-MCNC: 264 UG/DL (ref 250–425)
TRANSFERRIN SERPL-MCNC: 177 MG/DL (ref 215–365)
TRIGL SERPL-MCNC: 138 MG/DL (ref 30–149)
TSI SER-ACNC: 2.91 UIU/ML (ref 0.55–4.78)
UROBILINOGEN UR STRIP-ACNC: NORMAL
VENTRICULAR RATE: 104 BPM
VLDLC SERPL CALC-MCNC: 23 MG/DL (ref 0–30)
WBC # BLD AUTO: 3.4 X10(3) UL (ref 4–11)

## 2024-12-13 PROCEDURE — 84439 ASSAY OF FREE THYROXINE: CPT

## 2024-12-13 PROCEDURE — 80061 LIPID PANEL: CPT

## 2024-12-13 PROCEDURE — 84403 ASSAY OF TOTAL TESTOSTERONE: CPT

## 2024-12-13 PROCEDURE — 80053 COMPREHEN METABOLIC PANEL: CPT

## 2024-12-13 PROCEDURE — 85025 COMPLETE CBC W/AUTO DIFF WBC: CPT

## 2024-12-13 PROCEDURE — 99214 OFFICE O/P EST MOD 30 MIN: CPT | Performed by: PHYSICIAN ASSISTANT

## 2024-12-13 PROCEDURE — 84466 ASSAY OF TRANSFERRIN: CPT

## 2024-12-13 PROCEDURE — 36415 COLL VENOUS BLD VENIPUNCTURE: CPT

## 2024-12-13 PROCEDURE — 81001 URINALYSIS AUTO W/SCOPE: CPT

## 2024-12-13 PROCEDURE — 93005 ELECTROCARDIOGRAM TRACING: CPT

## 2024-12-13 PROCEDURE — 83540 ASSAY OF IRON: CPT

## 2024-12-13 PROCEDURE — 93010 ELECTROCARDIOGRAM REPORT: CPT | Performed by: INTERNAL MEDICINE

## 2024-12-13 PROCEDURE — 84443 ASSAY THYROID STIM HORMONE: CPT

## 2024-12-13 PROCEDURE — 82728 ASSAY OF FERRITIN: CPT

## 2024-12-13 PROCEDURE — 84153 ASSAY OF PSA TOTAL: CPT

## 2024-12-13 NOTE — PROGRESS NOTES
Cancer Center Progress Note    Patient Name: Lloyd Bui   YOB: 1950   Medical Record Number: [id number]     Chief Complaint:  Prostate cancer rectosigmoid cancer    History of Present Illness:  Cancer history:  74 year old with a family history of BRCA1 germ line mutation, who follows with medical oncology regarding clinically localized high risk prostate cancer who was treated with ADT, abiraterone, EBRT from May 2021 through September 2023.  He also carries a diagnosis of stage I ([id number]) adenocarcinoma of the rectosigmoid colon and underwent robotic assisted LAR 7/2/21.  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      Further prostate cancer treatment history:  May 2021 met with medical oncology and radiation oncology started ADT 5/13/2021 Baseline PSA 10.4   6/11/2021 PSA 1.5 continued ADT  9/3/2021 PSA 0.35 continue ADT  10/18/2021 orders placed for abiraterone/prednisone based on updated results from [facility]  12/3/21 PSA 0.02 on ADT and abiraterone/prednisone  1/14/2022 PSA 0.01 on ADT and abiraterone/prednisone  3/4/2022 PSA 0.01 on ADT and abiraterone/prednisone  6/6/2022 PSA 0.01 on ADT and abiraterone/prednisone  7/18/2022 PSA 0.01 on ADT and abiraterone/prednisone  9/8/2022 PSA 0.01 on ADT and abiraterone/prednisone  12/13/2022 PSA 0.01 on ADT and abiraterone/prednisone  3/14/2023 PSA 0.01 on ADT and abiraterone/prednisone  6/13/2023 PSA 0.01 on ADT and abiraterone/prednisone  9/12/2023 PSA 0.01 finishing ADT and abiraterone/prednisone  12/12/23 PSA 0 off treatment  3/12/24 PSA 0 off treatment  6/14/24 PSA undetectable   9/13/24: PSA undetectable  12/13/24:  PSA undetectable      Interval history:  Generally stable.  Feels well but he does continue to have intermittent GI bleeding which is frustrating.  He may have a BM every other day or four per day.  No change since his prostate radiation.  Notes his appetite is less  the past 6 months  Occasional dyspepsia, on a PPI.  Uses a cane due to chronic left knee pain, no falls.     Denies headache, dizziness, chest pain, palpitation, dyspnea, abdominal pain, hematuria, dysuria, hematochezia, new bone pain, peripheral edema and rash.     Performance Status:  ECOG 1  Past Medical History:  Past Medical History:    Anemia    Cirrhosis (HCC)    Elevated PSA, less than 10 ng/ml    Essential hypertension    Prostate cancer (HCC)    Rectosigmoid cancer (HCC)    Visual impairment    reading glasses       Past Surgical History:  Past Surgical History:   Procedure Laterality Date    Colonoscopy  04/14/2022    Colonoscopy  06/09/2021    Upper gi endoscopy,exam  05/19/2021       Family History:  Family History   Problem Relation Age of Onset    Cancer Father         esophageal Ca    Cancer Sister     Breast Cancer Sister         +BRCA       Social History:  Social History     Socioeconomic History    Marital status:    Tobacco Use    Smoking status: Never    Smokeless tobacco: Never   Vaping Use    Vaping status: Never Used   Substance and Sexual Activity    Alcohol use: Yes     Alcohol/week: 14.0 standard drinks of alcohol     Types: 14 Shots of liquor per week     Comment: 2 drinks of whisky daily    Drug use: Never         Current Medications:    Current Outpatient Medications:     pantoprazole 20 MG Oral Tab EC, Take 1 tablet (20 mg total) by mouth daily., Disp: 90 tablet, Rfl: 1    Glucosamine 500 MG Oral Cap, Take by mouth., Disp: , Rfl:     melatonin 5 MG Oral Cap, Take 1 capsule (5 mg total) by mouth nightly., Disp: , Rfl:     amLODIPine Besylate-Valsartan  MG Oral Tab, Take 1 tablet by mouth daily. (Patient not taking: Reported on 12/13/2024), Disp: 90 tablet, Rfl: 1    Current Outpatient Medications on File Prior to Visit   Medication Sig Dispense Refill    pantoprazole 20 MG Oral Tab EC Take 1 tablet (20 mg total) by mouth daily. 90 tablet 1    Glucosamine 500 MG Oral Cap  Take by mouth.      melatonin 5 MG Oral Cap Take 1 capsule (5 mg total) by mouth nightly.      amLODIPine Besylate-Valsartan  MG Oral Tab Take 1 tablet by mouth daily. (Patient not taking: Reported on 12/13/2024) 90 tablet 1     No current facility-administered medications on file prior to visit.     Allergies:  No Known Allergies     Review of Systems:  All other systems reviewed and negative x12    Vital Signs:  BP (!) 175/98 (BP Location: Left arm, Patient Position: Sitting, Cuff Size: adult)   Pulse (!) 121   Temp 98.1 °F (36.7 °C) (Tympanic)   Resp 18   Ht 1.727 m (5' 8\")   Wt 61.7 kg (136 lb)   SpO2 98%   BMI 20.68 kg/m²     Physical Examination:  General: Patient is alert and oriented x 3, not in acute distress.  Recheck BP manually RUE, sitting 160/90,  regular,  After 20\",   Psych:  Mood and affect appropriate  HEENT: EOMs intact. PERRL. Oropharynx is clear.   Neck: No JVD. No palpable lymphadenopathy. Neck is supple.  Chest: Symmetric expansion, nonlabored breathing, CTA  Cardiovascular: Tachycardic, regular rhythm, no murmur  Abdomen: Soft, non tender, non distended, bs+  Extremities: No edema.  Neurological: DTRs grossly intact, cane      Laboratory:  WBC: 3.4, done on 12/13/2024.  HGB: 11.3, done on 12/13/2024.  PLT: 201, done on 12/13/2024.     Lab Results   Component Value Date     (H) 12/13/2024    BUN 7 (L) 12/13/2024    BUNCREA 9.5 (L) 12/13/2024    CREATSERUM 0.74 12/13/2024    ANIONGAP 10 12/13/2024    GFRNAA 95 07/18/2022    GFRAA 110 07/18/2022    CA 9.3 12/13/2024    OSMOCALC 291 12/13/2024    ALKPHO 76 12/13/2024    AST 97 (H) 12/13/2024    ALT 43 12/13/2024    BILT 0.3 12/13/2024    TP 8.2 12/13/2024    ALB 4.5 12/13/2024    GLOBULIN 3.7 (H) 12/13/2024     12/13/2024    K 4.4 12/13/2024     12/13/2024    CO2 24.0 12/13/2024           Impression and Plan:  74 year old with a family history of BRCA1 germ line mutation, who follows with medical  oncology regarding clinically localized high risk prostate cancer who was treated with ADT, abiraterone, EBRT from May 2021 through September 2023.  He also carries a diagnosis of stage I ([id number]) adenocarcinoma of the rectosigmoid colon and underwent robotic assisted LAR 7/2/21.  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  PSA remains undetectable.  We will continue to follow with surveillance PSAs now that he is off therapy.  Per documentation, Genetic referral has been previously placed.    Iron deficiency anemia.  Stable after iron dextran on 7/2/24 .  He is also taking oral iron as maintenance.  Monitor.    Stage I adenocarcinoma of the rectosigmoid colon.  No further imaging indicated  Endoscopic evaluation scheduled in April 2025 per patient    Tachycardia  New, check TSH with free T4, denies caffeine, chronic left knee pain, asymptomatic, EKG, improved during visit    Elevated AST, liver fibrosis  Followed by Dr. Mccauley, last seen May 2024, prescribed Naltrexone for ETOH, liver US and AFP planned for this month, follow-up in January 2025.     HTN  Improved while in clinic, in July 2024, CCB/ARB discontinued since low BP readings, follow-up with PCP for management    Follow-up in 3 months with PSA check and assessment of iron indices.    30 minutes were spent in patient discussion, coordination of care, record review, lab review, imaging review. The diagnosis, prognosis, and general treatment was explained and all questions answered.     Cresencio Pressley PA-C  [facility]  [address] [facility]

## 2024-12-16 ENCOUNTER — TELEPHONE (OUTPATIENT)
Age: 74
End: 2024-12-16

## 2024-12-16 NOTE — TELEPHONE ENCOUNTER
LMOVM notifying patient that there are changes to his current EKG that need to be addressed.  Follow-up with PCP for further management since I don't see (in Epic) that he is established with cardiology.    Encouraged to call me prn but, more importantly, to contact PCP.

## 2024-12-18 ENCOUNTER — OFFICE VISIT (OUTPATIENT)
Dept: INTERNAL MEDICINE CLINIC | Facility: CLINIC | Age: 74
End: 2024-12-18
Payer: MEDICARE

## 2024-12-18 VITALS
DIASTOLIC BLOOD PRESSURE: 60 MMHG | TEMPERATURE: 98 F | HEIGHT: 68 IN | WEIGHT: 136 LBS | HEART RATE: 89 BPM | BODY MASS INDEX: 20.61 KG/M2 | OXYGEN SATURATION: 98 % | SYSTOLIC BLOOD PRESSURE: 100 MMHG

## 2024-12-18 DIAGNOSIS — I10 ESSENTIAL HYPERTENSION: Primary | ICD-10-CM

## 2024-12-18 DIAGNOSIS — R94.31 ABNORMAL EKG: ICD-10-CM

## 2024-12-18 PROCEDURE — 99214 OFFICE O/P EST MOD 30 MIN: CPT | Performed by: FAMILY MEDICINE

## 2024-12-18 RX ORDER — AMLODIPINE AND VALSARTAN 5; 160 MG/1; MG/1
1 TABLET ORAL DAILY
Qty: 30 TABLET | Refills: 0 | Status: SHIPPED | OUTPATIENT
Start: 2024-12-18

## 2024-12-28 NOTE — PROGRESS NOTES
HPI:     Chief Complaint   Patient presents with    Blood Pressure    Test Results       Lloyd Bui is a 74 year old male presenting for:      HTN  -Was having hypotension few months ago (7/2024) and HTN meds were temporarily stopped at was volume depleted due to acute anemia. Pt was advised to monitor BP daily then send BP log after 2 weeks which he was resistant to checking daily and also told to return to OV in 1mo but did not. On 11/2024 was seen in office and pt never resumed medication but reported that home BP's were normal despite a borderline elevation during the OV. He wished to remain off meds and monitor at home. Was advised to call if BP was elevated w/o meds  -5d ago was seen at onc clinic and noted to be tachycardic to 121 and /98 with slight improvement upon recheck. Was sent to obtain an EKG which showed changes and was told to resume BP med and see PCP  -Reports since starting his BP medication, in the past 4 days BP has been in 's/60's and feeling weaker and lightheaded          Results for orders placed or performed in visit on 12/13/24   EKG 12 Lead    Collection Time: 12/13/24  2:41 PM   Result Value Ref Range    Ventricular rate 104 BPM    Atrial rate 104 BPM    P-R Interval 168 ms    QRS Duration 96 ms    Q-T Interval 398 ms    QTC Calculation (Bezet) 523 ms    P Axis 54 degrees    R Axis 6 degrees    T Axis 63 degrees       Labs:   Lab Results   Component Value Date/Time    A1C 5.0 06/21/2021 01:52 PM      Lab Results   Component Value Date/Time    CHOLEST 223 (H) 12/13/2024 01:05 PM    HDL 96 (H) 12/13/2024 01:05 PM    TRIG 138 12/13/2024 01:05 PM     (H) 12/13/2024 01:05 PM    NONHDLC 127 12/13/2024 01:05 PM       Lab Results   Component Value Date/Time     (H) 12/13/2024 12:53 PM     12/13/2024 12:53 PM    K 4.4 12/13/2024 12:53 PM     12/13/2024 12:53 PM    CO2 24.0 12/13/2024 12:53 PM    CREATSERUM 0.74 12/13/2024 12:53 PM    CA 9.3 12/13/2024  12:53 PM    ALB 4.5 12/13/2024 12:53 PM    TP 8.2 12/13/2024 12:53 PM    ALKPHO 76 12/13/2024 12:53 PM    AST 97 (H) 12/13/2024 12:53 PM    ALT 43 12/13/2024 12:53 PM    BILT 0.3 12/13/2024 12:53 PM    TSH 2.911 12/13/2024 01:05 PM    T4F 0.9 12/13/2024 01:05 PM          Medications:  Current Outpatient Medications   Medication Sig Dispense Refill    amLODIPine Besylate-Valsartan 5-160 MG Oral Tab Take 1 tablet by mouth daily. 30 tablet 0    pantoprazole 20 MG Oral Tab EC Take 1 tablet (20 mg total) by mouth daily. 90 tablet 1    Glucosamine 500 MG Oral Cap Take by mouth.      melatonin 5 MG Oral Cap Take 1 capsule (5 mg total) by mouth nightly.        Past Medical History:    Anemia    Cirrhosis (HCC)    Elevated PSA, less than 10 ng/ml    Essential hypertension    Prostate cancer (HCC)    Rectosigmoid cancer (HCC)    Visual impairment    reading glasses         Past Surgical History:   Procedure Laterality Date    Colonoscopy  04/14/2022    Colonoscopy  06/09/2021    Upper gi endoscopy,exam  05/19/2021     Allergies[1]   Social History:  Social History     Socioeconomic History    Marital status:    Tobacco Use    Smoking status: Never    Smokeless tobacco: Never   Vaping Use    Vaping status: Never Used   Substance and Sexual Activity    Alcohol use: Yes     Alcohol/week: 14.0 standard drinks of alcohol     Types: 14 Shots of liquor per week     Comment: 2 drinks of whisky daily    Drug use: Never      Family History:  Family History   Problem Relation Age of Onset    Cancer Father         esophageal Ca    Cancer Sister     Breast Cancer Sister         +BRCA          REVIEW OF SYSTEMS:   Review of Systems   Constitutional:  Negative for fatigue and fever.   Respiratory:  Negative for cough and shortness of breath.    Cardiovascular:  Negative for chest pain, palpitations and leg swelling.   Gastrointestinal:  Negative for abdominal pain, constipation, diarrhea and vomiting.   Musculoskeletal:  Negative for  arthralgias.   Neurological:  Positive for light-headedness. Negative for headaches.            PHYSICAL EXAM:   /60   Pulse 89   Temp 98.1 °F (36.7 °C)   Ht 5' 8\" (1.727 m)   Wt 136 lb (61.7 kg)   SpO2 98%   BMI 20.68 kg/m²  Estimated body mass index is 20.68 kg/m² as calculated from the following:    Height as of this encounter: 5' 8\" (1.727 m).    Weight as of this encounter: 136 lb (61.7 kg).     Wt Readings from Last 3 Encounters:   12/18/24 136 lb (61.7 kg)   12/13/24 136 lb (61.7 kg)   11/18/24 140 lb (63.5 kg)       Physical Exam  Vitals reviewed.   Constitutional:       General: He is not in acute distress.     Appearance: He is well-developed.   Cardiovascular:      Rate and Rhythm: Normal rate and regular rhythm.      Heart sounds: Normal heart sounds. No murmur heard.  Pulmonary:      Effort: Pulmonary effort is normal. No respiratory distress.      Breath sounds: Normal breath sounds.   Abdominal:      General: Bowel sounds are normal. There is no distension.      Palpations: Abdomen is soft.      Tenderness: There is no abdominal tenderness.   Musculoskeletal:      Right lower leg: No edema.      Left lower leg: No edema.   Neurological:      General: No focal deficit present.             ASSESSMENT AND PLAN:   Patient is a 74 year old male who presents primarily presents for:    Diagnoses and all orders for this visit:    Essential hypertension  -     amLODIPine Besylate-Valsartan 5-160 MG Oral Tab; Take 1 tablet by mouth daily.  -hypotensive and symptomatic with medication however w/o medication hypertensive  -will decrease from 10-320mg to 5-160mg.  -strongly advised to check BP for 2 wks and call with BP log. At previous visits he was advised of this and did not comply. Stressed importance of compliance in order to appropriately manage his BP and discussed risks. pT adamant of not checking daily but will try  -watch salt intake, regular exercise, DASH/heart healthy eating  - if elevated  reading >160/100 notify Md immediately.    Abnormal EKG  -     EKG 12 Lead to be performed at [facility]; Future  -EKG was poor quality data, thus advise to repeat it today now that his HR has normalized  -declines cardio f/u as no CP/SOB and distrusts the EKG quality which is reasonable to hold off now that BP /HR is more stable.           Return if symptoms worsen or fail to improve, for CALL  IN 2 wks with blood pressure log. Has Appt in 2mo scheduled  Patient indicates understanding of the above recommendations and agrees to the above plan.  Reasurrance and education provided. All questions answered.  Notified to call with any questions, complications, allergies, or worsening or changing symptoms as well as any side effects or complications from the treatments .  Red flags/ ER precautions discussed.    Spent 30 minutes including chart review, reviewing appropriate medical history, evaluating patient, discussing treatment options, counseling and education (diet and exercise), ordering appropriate diagnostic tests and completing documentation.        Meds & Refills for this Visit:  Requested Prescriptions     Signed Prescriptions Disp Refills    amLODIPine Besylate-Valsartan 5-160 MG Oral Tab 30 tablet 0     Sig: Take 1 tablet by mouth daily.       No orders of the defined types were placed in this encounter.      Imaging & Consults:  EKG 12-LEAD    Health Maintenance:  Health Maintenance   Topic Date Due    Pneumococcal Vaccine: 65+ Years (1 of 2 - PCV) Never done    Zoster Vaccines (1 of 2) Never done    COVID-19 Vaccine (4 - 2024-25 season) 09/01/2024    Colorectal Cancer Screening  04/13/2025    Influenza Vaccine (1) 06/30/2025 (Originally 10/1/2024)    Annual Physical  05/15/2025    PSA  12/13/2026    Annual Depression Screening  Completed    Fall Risk Screening (Annual)  Completed         Iesha De La Rosa MD                [1] No Known Allergies

## 2025-01-14 ENCOUNTER — TELEPHONE (OUTPATIENT)
Dept: INTERNAL MEDICINE CLINIC | Facility: CLINIC | Age: 75
End: 2025-01-14

## 2025-01-14 DIAGNOSIS — I10 ESSENTIAL HYPERTENSION: ICD-10-CM

## 2025-01-14 NOTE — TELEPHONE ENCOUNTER
Pt is calling stating that last visit doctor told him that would need to let her know about blood pressure, pt states that of right now its been around 135 to 100 sometimes lower. Pt wants to know if would still need to follow up 02/20.      Pt also requesting refill on amlodipine.      Please call and advise

## 2025-01-15 RX ORDER — AMLODIPINE AND VALSARTAN 5; 160 MG/1; MG/1
1 TABLET ORAL DAILY
Qty: 90 TABLET | Refills: 0 | Status: SHIPPED | OUTPATIENT
Start: 2025-01-15

## 2025-01-15 NOTE — TELEPHONE ENCOUNTER
Most of the time the blood pressure is between 100-130 meaning systolic number patient doesn't have exact readings but states diastolic number is between 70-80 , per patient he also does not have time to check blood pressure daily and is not keeping a log he states the last time he checked his blood pressure was two days ago and b/p reading was around 100/70. He states his blood pressure keeps fluctuating but again he is not checking it daily and he is not keeping a log because he does not have time.  By the way he prefers we call him back tomorrow morning.      Rx was sent

## 2025-01-15 NOTE — TELEPHONE ENCOUNTER
Pt called stating that he only needs refills for the Amlodipine   He is down to 2 pills     Please inform pt when medication has been sent

## 2025-01-15 NOTE — TELEPHONE ENCOUNTER
Noted. He also during OV states that he does not want to check it regularly. Overall numbers improved though

## 2025-01-15 NOTE — TELEPHONE ENCOUNTER
I\"m very confused by these readings. Its it 135/100 or does he mean to say that the systolic ranges anywhere from 100-135?  Can he given a more detailed list?

## 2025-02-14 ENCOUNTER — TELEPHONE (OUTPATIENT)
Facility: CLINIC | Age: 75
End: 2025-02-14

## 2025-02-14 NOTE — TELEPHONE ENCOUNTER
Patient outreach message received:    Last colonoscopy done 4/13/22. 3 year recall placed into Pt Outreach, next due on 4/13/25 per Dr. Barrera.     Recall reminder letter sent out to patient via [employer].

## 2025-03-14 ENCOUNTER — OFFICE VISIT (OUTPATIENT)
Age: 75
End: 2025-03-14
Attending: STUDENT IN AN ORGANIZED HEALTH CARE EDUCATION/TRAINING PROGRAM
Payer: MEDICARE

## 2025-03-14 VITALS
RESPIRATION RATE: 18 BRPM | HEIGHT: 68 IN | BODY MASS INDEX: 20.76 KG/M2 | HEART RATE: 102 BPM | WEIGHT: 137 LBS | TEMPERATURE: 98 F | DIASTOLIC BLOOD PRESSURE: 80 MMHG | SYSTOLIC BLOOD PRESSURE: 124 MMHG | OXYGEN SATURATION: 100 %

## 2025-03-14 DIAGNOSIS — D50.0 IRON DEFICIENCY ANEMIA DUE TO CHRONIC BLOOD LOSS: ICD-10-CM

## 2025-03-14 DIAGNOSIS — C61 PROSTATE CANCER (HCC): ICD-10-CM

## 2025-03-14 DIAGNOSIS — C61 PROSTATE CANCER (HCC): Primary | ICD-10-CM

## 2025-03-14 DIAGNOSIS — I10 ESSENTIAL HYPERTENSION: ICD-10-CM

## 2025-03-14 LAB
ALBUMIN SERPL-MCNC: 4.7 G/DL (ref 3.2–4.8)
ALBUMIN/GLOB SERPL: 1.4 {RATIO} (ref 1–2)
ALP LIVER SERPL-CCNC: 69 U/L
ALT SERPL-CCNC: 45 U/L
ANION GAP SERPL CALC-SCNC: 11 MMOL/L (ref 0–18)
AST SERPL-CCNC: 71 U/L (ref ?–34)
BASOPHILS # BLD AUTO: 0.03 X10(3) UL (ref 0–0.2)
BASOPHILS NFR BLD AUTO: 0.7 %
BILIRUB SERPL-MCNC: 0.3 MG/DL (ref 0.2–1.1)
BUN BLD-MCNC: 12 MG/DL (ref 9–23)
BUN/CREAT SERPL: 14 (ref 10–20)
CALCIUM BLD-MCNC: 9.3 MG/DL (ref 8.7–10.4)
CHLORIDE SERPL-SCNC: 103 MMOL/L (ref 98–112)
CO2 SERPL-SCNC: 20 MMOL/L (ref 21–32)
CREAT BLD-MCNC: 0.86 MG/DL
DEPRECATED HBV CORE AB SER IA-ACNC: 16 NG/ML
DEPRECATED RDW RBC AUTO: 47.1 FL (ref 35.1–46.3)
EGFRCR SERPLBLD CKD-EPI 2021: 91 ML/MIN/1.73M2 (ref 60–?)
EOSINOPHIL # BLD AUTO: 0.11 X10(3) UL (ref 0–0.7)
EOSINOPHIL NFR BLD AUTO: 2.6 %
ERYTHROCYTE [DISTWIDTH] IN BLOOD BY AUTOMATED COUNT: 13.9 % (ref 11–15)
GLOBULIN PLAS-MCNC: 3.4 G/DL (ref 2–3.5)
GLUCOSE BLD-MCNC: 86 MG/DL (ref 70–99)
HCT VFR BLD AUTO: 27.4 %
HGB BLD-MCNC: 9.2 G/DL
IMM GRANULOCYTES # BLD AUTO: 0.01 X10(3) UL (ref 0–1)
IMM GRANULOCYTES NFR BLD: 0.2 %
IRON SATN MFR SERPL: 14 %
IRON SERPL-MCNC: 46 UG/DL
LYMPHOCYTES # BLD AUTO: 0.92 X10(3) UL (ref 1–4)
LYMPHOCYTES NFR BLD AUTO: 21.7 %
MCH RBC QN AUTO: 31.2 PG (ref 26–34)
MCHC RBC AUTO-ENTMCNC: 33.6 G/DL (ref 31–37)
MCV RBC AUTO: 92.9 FL
MONOCYTES # BLD AUTO: 0.63 X10(3) UL (ref 0.1–1)
MONOCYTES NFR BLD AUTO: 14.9 %
NEUTROPHILS # BLD AUTO: 2.54 X10 (3) UL (ref 1.5–7.7)
NEUTROPHILS # BLD AUTO: 2.54 X10(3) UL (ref 1.5–7.7)
NEUTROPHILS NFR BLD AUTO: 59.9 %
OSMOLALITY SERPL CALC.SUM OF ELEC: 277 MOSM/KG (ref 275–295)
PLATELET # BLD AUTO: 187 10(3)UL (ref 150–450)
POTASSIUM SERPL-SCNC: 4.9 MMOL/L (ref 3.5–5.1)
PROT SERPL-MCNC: 8.1 G/DL (ref 5.7–8.2)
PSA SERPL-MCNC: <0.04 NG/ML (ref ?–4)
RBC # BLD AUTO: 2.95 X10(6)UL
SODIUM SERPL-SCNC: 134 MMOL/L (ref 136–145)
TOTAL IRON BINDING CAPACITY: 325 UG/DL (ref 250–425)
TRANSFERRIN SERPL-MCNC: 255 MG/DL (ref 215–365)
WBC # BLD AUTO: 4.2 X10(3) UL (ref 4–11)

## 2025-03-14 NOTE — PROGRESS NOTES
[facility]   Cancer Center Progress Note    Patient Name: Lloyd Bui   YOB: 1950   Medical Record Number: [id number]     Chief Complaint:  Prostate cancer rectosigmoid cancer    History of Present Illness:  Cancer history:  74 year old with a family history of BRCA1 germ line mutation, who follows with medical oncology regarding clinically localized high risk prostate cancer who was treated with ADT, abiraterone, EBRT from May 2021 through September 2023.  He also carries a diagnosis of stage I ([id number]) adenocarcinoma of the rectosigmoid colon and underwent robotic assisted LAR 7/2/21.  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      Further prostate cancer treatment history:  May 2021 met with medical oncology and radiation oncology started ADT 5/13/2021 Baseline PSA 10.4   6/11/2021 PSA 1.5 continued ADT  9/3/2021 PSA 0.35 continue ADT  10/18/2021 orders placed for abiraterone/prednisone based on updated results from [facility]  12/3/21 PSA 0.02 on ADT and abiraterone/prednisone  1/14/2022 PSA 0.01 on ADT and abiraterone/prednisone  3/4/2022 PSA 0.01 on ADT and abiraterone/prednisone  6/6/2022 PSA 0.01 on ADT and abiraterone/prednisone  7/18/2022 PSA 0.01 on ADT and abiraterone/prednisone  9/8/2022 PSA 0.01 on ADT and abiraterone/prednisone  12/13/2022 PSA 0.01 on ADT and abiraterone/prednisone  3/14/2023 PSA 0.01 on ADT and abiraterone/prednisone  6/13/2023 PSA 0.01 on ADT and abiraterone/prednisone  9/12/2023 PSA 0.01 finishing ADT and abiraterone/prednisone  12/12/23 PSA 0 off treatment  3/12/24 PSA 0 off treatment  6/14/24 PSA undetectable   9/13/24: PSA undetectable  12/13/24:  PSA undetectable  3/14/25: PSA remains undetectable    Interval history:  Generally stable.  Feels well but he does continue to have intermittent GI bleeding which is frustrating.  He may have a BM every other day or four per  day.  No change since his prostate radiation.  Notes his appetite is less the past 6 months  Occasional dyspepsia, on a PPI.  Uses a cane due to chronic left knee pain, no falls.     Denies headache, dizziness, chest pain, palpitation, dyspnea, abdominal pain, hematuria, dysuria, hematochezia, new bone pain, peripheral edema and rash.     Performance Status:  ECOG 1  Past Medical History:  Past Medical History:    Anemia    Cirrhosis (HCC)    Elevated PSA, less than 10 ng/ml    Essential hypertension    Prostate cancer (HCC)    Rectosigmoid cancer (HCC)    Visual impairment    reading glasses       Past Surgical History:  Past Surgical History:   Procedure Laterality Date    Colonoscopy  04/14/2022    Colonoscopy  06/09/2021    Upper gi endoscopy,exam  05/19/2021       Family History:  Family History   Problem Relation Age of Onset    Cancer Father         esophageal Ca    Cancer Sister     Breast Cancer Sister         +BRCA       Social History:  Social History     Socioeconomic History    Marital status:    Tobacco Use    Smoking status: Never    Smokeless tobacco: Never   Vaping Use    Vaping status: Never Used   Substance and Sexual Activity    Alcohol use: Yes     Alcohol/week: 14.0 standard drinks of alcohol     Types: 14 Shots of liquor per week     Comment: 2 drinks of whisky daily    Drug use: Never         Current Medications:    Current Outpatient Medications:     amLODIPine Besylate-Valsartan 5-160 MG Oral Tab, Take 1 tablet by mouth daily., Disp: 90 tablet, Rfl: 0    pantoprazole 20 MG Oral Tab EC, Take 1 tablet (20 mg total) by mouth daily., Disp: 90 tablet, Rfl: 1    Glucosamine 500 MG Oral Cap, Take by mouth., Disp: , Rfl:     melatonin 5 MG Oral Cap, Take 1 capsule (5 mg total) by mouth nightly., Disp: , Rfl:     Current Outpatient Medications on File Prior to Visit   Medication Sig Dispense Refill    amLODIPine Besylate-Valsartan 5-160 MG Oral Tab Take 1 tablet by mouth daily. 90 tablet 0     pantoprazole 20 MG Oral Tab EC Take 1 tablet (20 mg total) by mouth daily. 90 tablet 1    Glucosamine 500 MG Oral Cap Take by mouth.      melatonin 5 MG Oral Cap Take 1 capsule (5 mg total) by mouth nightly.       No current facility-administered medications on file prior to visit.     Allergies:  No Known Allergies     Review of Systems:  All other systems reviewed and negative x12    Vital Signs:  /80 (BP Location: Left arm, Patient Position: Sitting, Cuff Size: adult)   Pulse 102   Temp 98.3 °F (36.8 °C) (Oral)   Resp 18   Ht 1.727 m (5' 8\")   Wt 62.1 kg (137 lb)   SpO2 100%   BMI 20.83 kg/m²     Physical Examination:  General: Patient is alert and oriented x 3, not in acute distress.  Recheck BP manually RUE, sitting 160/90,  regular,  After 20\",   Psych:  Mood and affect appropriate  HEENT: EOMs intact. PERRL. Oropharynx is clear.   Neck: No JVD. No palpable lymphadenopathy. Neck is supple.  Chest: Symmetric expansion, nonlabored breathing, CTA  Cardiovascular: Tachycardic, regular rhythm, no murmur  Abdomen: Soft, non tender, non distended, bs+  Extremities: No edema.  Neurological: DTRs grossly intact, cane      Laboratory:  WBC: 4.2, done on 3/14/2025.  HGB: 9.2, done on 3/14/2025.  PLT: 187, done on 3/14/2025.     Lab Results   Component Value Date    GLU 86 03/14/2025    BUN 12 03/14/2025    BUNCREA 14.0 03/14/2025    CREATSERUM 0.86 03/14/2025    ANIONGAP 11 03/14/2025    GFRNAA 95 07/18/2022    GFRAA 110 07/18/2022    CA 9.3 03/14/2025    OSMOCALC 277 03/14/2025    ALKPHO 69 03/14/2025    AST 71 (H) 03/14/2025    ALT 45 03/14/2025    BILT 0.3 03/14/2025    TP 8.1 03/14/2025    ALB 4.7 03/14/2025    GLOBULIN 3.4 03/14/2025     (L) 03/14/2025    K 4.9 03/14/2025     03/14/2025    CO2 20.0 (L) 03/14/2025           Impression and Plan:  74 year old with a family history of BRCA1 germ line mutation, who follows with medical oncology regarding clinically localized high risk  prostate cancer who was treated with ADT, abiraterone, EBRT from May 2021 through September 2023.  He also carries a diagnosis of stage I ([id number]) adenocarcinoma of the rectosigmoid colon and underwent robotic assisted LAR 7/2/21.  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  PSA remains undetectable.  We will continue to follow with surveillance PSAs now that he is off therapy.  Genetics was previously placed.    Iron deficiency anemia.  Recurrent MEHDI, he last received 1 g iron dextran July 2024.  He is taking oral iron about twice per week, we will increase this to 3-4 times a week and plan for another dose of iron dextran in the near future.  Follow-up with PSA and iron indices in about 3 months.     Stage I adenocarcinoma of the rectosigmoid colon.  No further imaging indicated  Endoscopic evaluation scheduled in April 2025 per patient    Tachycardia  New, check TSH with free T4, denies caffeine, chronic left knee pain, asymptomatic, EKG, improved during visit    Elevated AST, liver fibrosis  Followed by Dr. Mccauley, last seen May 2024, prescribed Naltrexone for ETOH, liver US and AFP planned for this month, follow-up in January 2025.     HTN  Improved while in clinic, in July 2024, CCB/ARB discontinued since low BP readings, follow-up with PCP for management    Follow-up in 3 months with PSA check and assessment of iron indices.    40 minutes were spent in patient discussion, coordination of care, record review, lab review, imaging review. The diagnosis, prognosis, and general treatment was explained and all questions answered.     Nathan Laguna,     [facility]  [facility]     This note was created using a voice-recognition transcribing system. Incorrect words or phrases may have been missed during proofreading. Please interpret accordingly.

## 2025-04-14 ENCOUNTER — TELEPHONE (OUTPATIENT)
Age: 75
End: 2025-04-14

## 2025-04-14 DIAGNOSIS — I10 ESSENTIAL HYPERTENSION: ICD-10-CM

## 2025-04-15 RX ORDER — AMLODIPINE AND VALSARTAN 5; 160 MG/1; MG/1
1 TABLET ORAL DAILY
Qty: 90 TABLET | Refills: 1 | Status: SHIPPED | OUTPATIENT
Start: 2025-04-15 | End: 2025-07-21

## 2025-05-12 PROBLEM — Z85.46 HISTORY OF PROSTATE CANCER: Status: ACTIVE | Noted: 2021-05-19

## 2025-05-12 PROBLEM — C19 RECTOSIGMOID CANCER (HCC): Status: RESOLVED | Noted: 2023-11-15 | Resolved: 2025-05-12

## 2025-05-19 ENCOUNTER — TELEPHONE (OUTPATIENT)
Age: 75
End: 2025-05-19

## 2025-06-23 ENCOUNTER — NURSE ONLY (OUTPATIENT)
Age: 75
End: 2025-06-23
Attending: STUDENT IN AN ORGANIZED HEALTH CARE EDUCATION/TRAINING PROGRAM
Payer: MEDICARE

## 2025-06-23 VITALS
HEART RATE: 94 BPM | OXYGEN SATURATION: 100 % | WEIGHT: 141 LBS | RESPIRATION RATE: 18 BRPM | SYSTOLIC BLOOD PRESSURE: 106 MMHG | HEIGHT: 68 IN | DIASTOLIC BLOOD PRESSURE: 60 MMHG | BODY MASS INDEX: 21.37 KG/M2 | TEMPERATURE: 97 F

## 2025-06-23 DIAGNOSIS — D50.0 IRON DEFICIENCY ANEMIA DUE TO CHRONIC BLOOD LOSS: Primary | ICD-10-CM

## 2025-06-23 DIAGNOSIS — C61 PROSTATE CANCER (HCC): ICD-10-CM

## 2025-06-23 DIAGNOSIS — Z85.048 HISTORY OF CANCER OF RECTOSIGMOID JUNCTION: ICD-10-CM

## 2025-06-23 DIAGNOSIS — R00.0 TACHYCARDIA: ICD-10-CM

## 2025-06-23 DIAGNOSIS — D50.0 IRON DEFICIENCY ANEMIA DUE TO CHRONIC BLOOD LOSS: ICD-10-CM

## 2025-06-23 LAB
ALBUMIN SERPL-MCNC: 4.3 G/DL (ref 3.2–4.8)
ALBUMIN/GLOB SERPL: 1.5 {RATIO} (ref 1–2)
ALP LIVER SERPL-CCNC: 63 U/L (ref 45–117)
ALT SERPL-CCNC: 23 U/L (ref 10–49)
ANION GAP SERPL CALC-SCNC: 12 MMOL/L (ref 0–18)
ANTIBODY SCREEN: NEGATIVE
AST SERPL-CCNC: 36 U/L (ref ?–34)
BASOPHILS # BLD AUTO: 0.03 X10(3) UL (ref 0–0.2)
BASOPHILS NFR BLD AUTO: 0.8 %
BILIRUB SERPL-MCNC: 0.2 MG/DL (ref 0.2–1.1)
BUN BLD-MCNC: 13 MG/DL (ref 9–23)
BUN/CREAT SERPL: 10.2 (ref 10–20)
CALCIUM BLD-MCNC: 8.5 MG/DL (ref 8.7–10.4)
CHLORIDE SERPL-SCNC: 101 MMOL/L (ref 98–112)
CO2 SERPL-SCNC: 18 MMOL/L (ref 21–32)
CREAT BLD-MCNC: 1.28 MG/DL (ref 0.7–1.3)
DEPRECATED HBV CORE AB SER IA-ACNC: 10 NG/ML (ref 50–336)
DEPRECATED RDW RBC AUTO: 48.1 FL (ref 35.1–46.3)
EGFRCR SERPLBLD CKD-EPI 2021: 59 ML/MIN/1.73M2 (ref 60–?)
EOSINOPHIL # BLD AUTO: 0.08 X10(3) UL (ref 0–0.7)
EOSINOPHIL NFR BLD AUTO: 2.2 %
ERYTHROCYTE [DISTWIDTH] IN BLOOD BY AUTOMATED COUNT: 14.9 % (ref 11–15)
FASTING STATUS PATIENT QL REPORTED: NO
GLOBULIN PLAS-MCNC: 2.9 G/DL (ref 2–3.5)
GLUCOSE BLD-MCNC: 119 MG/DL (ref 70–99)
HCT VFR BLD AUTO: 19.2 % (ref 39–53)
HGB BLD-MCNC: 6 G/DL (ref 13–17.5)
IMM GRANULOCYTES # BLD AUTO: 0.01 X10(3) UL (ref 0–1)
IMM GRANULOCYTES NFR BLD: 0.3 %
IRON SATN MFR SERPL: 5 % (ref 20–50)
IRON SERPL-MCNC: 18 UG/DL (ref 65–175)
LYMPHOCYTES # BLD AUTO: 0.82 X10(3) UL (ref 1–4)
LYMPHOCYTES NFR BLD AUTO: 22.4 %
MCH RBC QN AUTO: 27.4 PG (ref 26–34)
MCHC RBC AUTO-ENTMCNC: 31.3 G/DL (ref 31–37)
MCV RBC AUTO: 87.7 FL (ref 80–100)
MONOCYTES # BLD AUTO: 0.53 X10(3) UL (ref 0.1–1)
MONOCYTES NFR BLD AUTO: 14.5 %
NEUTROPHILS # BLD AUTO: 2.19 X10 (3) UL (ref 1.5–7.7)
NEUTROPHILS # BLD AUTO: 2.19 X10(3) UL (ref 1.5–7.7)
NEUTROPHILS NFR BLD AUTO: 59.8 %
OSMOLALITY SERPL CALC.SUM OF ELEC: 273 MOSM/KG (ref 275–295)
PLATELET # BLD AUTO: 227 10(3)UL (ref 150–450)
POTASSIUM SERPL-SCNC: 3.8 MMOL/L (ref 3.5–5.1)
PROT SERPL-MCNC: 7.2 G/DL (ref 5.7–8.2)
PSA SERPL-MCNC: <0.04 NG/ML (ref ?–4)
RBC # BLD AUTO: 2.19 X10(6)UL (ref 3.8–5.8)
RH BLOOD TYPE: POSITIVE
SODIUM SERPL-SCNC: 131 MMOL/L (ref 136–145)
TOTAL IRON BINDING CAPACITY: 336 UG/DL (ref 250–425)
TRANSFERRIN SERPL-MCNC: 265 MG/DL (ref 215–365)
WBC # BLD AUTO: 3.7 X10(3) UL (ref 4–11)

## 2025-06-23 PROCEDURE — 85060 BLOOD SMEAR INTERPRETATION: CPT

## 2025-06-23 RX ORDER — ACETAMINOPHEN 325 MG/1
650 TABLET ORAL ONCE
OUTPATIENT
Start: 2025-06-24

## 2025-06-23 RX ORDER — DIPHENHYDRAMINE HCL 25 MG
25 CAPSULE ORAL ONCE
OUTPATIENT
Start: 2025-06-24

## 2025-06-24 ENCOUNTER — OFFICE VISIT (OUTPATIENT)
Age: 75
End: 2025-06-24
Attending: STUDENT IN AN ORGANIZED HEALTH CARE EDUCATION/TRAINING PROGRAM
Payer: MEDICARE

## 2025-06-24 VITALS
HEART RATE: 84 BPM | OXYGEN SATURATION: 99 % | SYSTOLIC BLOOD PRESSURE: 119 MMHG | RESPIRATION RATE: 16 BRPM | TEMPERATURE: 98 F | DIASTOLIC BLOOD PRESSURE: 56 MMHG

## 2025-06-24 DIAGNOSIS — D50.0 IRON DEFICIENCY ANEMIA DUE TO CHRONIC BLOOD LOSS: Primary | ICD-10-CM

## 2025-06-24 DIAGNOSIS — Z85.46 HISTORY OF PROSTATE CANCER: ICD-10-CM

## 2025-06-24 DIAGNOSIS — D50.9 IRON DEFICIENCY ANEMIA, UNSPECIFIED IRON DEFICIENCY ANEMIA TYPE: ICD-10-CM

## 2025-06-24 DIAGNOSIS — K62.7 RADIATION INDUCED PROCTITIS: ICD-10-CM

## 2025-06-24 RX ORDER — DIPHENHYDRAMINE HCL 25 MG
25 CAPSULE ORAL ONCE
Status: DISCONTINUED | OUTPATIENT
Start: 2025-06-24 | End: 2025-06-24

## 2025-06-24 RX ORDER — DIPHENHYDRAMINE HCL 25 MG
25 CAPSULE ORAL ONCE
Status: CANCELLED | OUTPATIENT
Start: 2025-06-24

## 2025-06-24 RX ORDER — ACETAMINOPHEN 325 MG/1
650 TABLET ORAL ONCE
Status: COMPLETED | OUTPATIENT
Start: 2025-06-24 | End: 2025-06-24

## 2025-06-24 RX ORDER — ACETAMINOPHEN 325 MG/1
650 TABLET ORAL ONCE
Status: CANCELLED | OUTPATIENT
Start: 2025-06-24

## 2025-06-24 RX ORDER — DIPHENHYDRAMINE HCL 25 MG
CAPSULE ORAL
Status: DISCONTINUED
Start: 2025-06-24 | End: 2025-06-24

## 2025-06-24 RX ORDER — ACETAMINOPHEN 325 MG/1
TABLET ORAL
Status: COMPLETED
Start: 2025-06-24 | End: 2025-06-24

## 2025-06-24 RX ADMIN — ACETAMINOPHEN 650 MG: 325 TABLET ORAL at 08:36:00

## 2025-06-24 NOTE — PATIENT INSTRUCTIONS
Post Transfusion Instructions for Out-Patients    Most recipients of blood transfusions do not experience any adverse effects.  You may resume your normal activities 4 to 6 hours after your blood transfusion.  Occasionally, reactions of blood transfusions may be delayed (for several weeks).    Symptoms of a transfusion reaction can include:    Faintness  Weakness  Nausea  Vomiting  Shortness of breath  Chest pain  Back pain  Hives  Rash  Itch  Flushing  Fever  Chills  Jaundice (yellowish tint to eyes/skin)  Dark or red urine    Though these symptoms may not be related to the blood transfusions, they should be reported to your physician.  Contact both your physician and the laboratory Blood Bank (see information below) so that your symptoms can be thoroughly evaluated.    Your physician's name: Luz Elena  Your physician's phone number: [phone]    If your physician is unavailable, contact the Emergency Department.    [facility] Blood Bank:  [phone]  [facility] Emergency Department:  [phone]    [facility] Blood Bank: [phone]  [facility] Emergency Department: [phone]

## 2025-06-24 NOTE — PROGRESS NOTES
10/22/2021    Clementina Herrmann (:  1976) is a 38 y.o. female, here for evaluation of the following medical concerns: This virtual encounter is being conducted to reduce the spread of coronavirus and reduce the morbidity and mortality risk of exposure related to the virus. HPI    42-year-old female with a history of obesity , GERD, and palpitations presents for follow-up visit. Anxiety: The patient reports that her anxiety is well controlled via Celexa 20 mg orally daily. Elevated a1c: Implementing lifestyle changes, facilitating weight loss. Class III obesity:  Her body mass index is presently 40.03 down from 42.69. She is compliant with Adipex and is engaging in aerobic exercise regularly. She is additionally meticulously monitoring her caloric intake. GERD: Symptoms under fair control. Utilizing Prilosec as needed. Tacchycardia:  Asymptomatic. Intraocular abnormality: The patient is followed by ophthalmology for a right intraocular abnormality. At present he denies polyuria,  Polydipsia, constitutional, sinus, visual, cardiopulmonary, urologic, gastrointestinal, immunologic/hematologic, musculoskeletal, neurologic,dermatologic, or psychiatric complaints. Review of Systems   Constitutional: Negative for chills, diaphoresis, fatigue and fever. HENT: Negative for congestion, dental problem, drooling, ear discharge, ear pain, facial swelling, hearing loss, mouth sores, nosebleeds, postnasal drip, rhinorrhea, sinus pressure, sinus pain, sneezing, sore throat, tinnitus, trouble swallowing and voice change. Eyes: Negative for photophobia, pain, discharge, redness, itching and visual disturbance. Respiratory: Negative for apnea, cough, chest tightness, shortness of breath and wheezing. Cardiovascular: Negative for chest pain, palpitations and leg swelling.    Gastrointestinal: Negative for abdominal distention, abdominal pain, [facility]   Cancer Center Progress Note    Patient Name: Lloyd Bui   YOB: 1950   Medical Record Number: [id number]     Chief Complaint:  Prostate cancer rectosigmoid cancer    History of Present Illness:  Cancer history:  74 year old with a family history of BRCA1 germ line mutation, who follows with medical oncology regarding clinically localized high risk prostate cancer who was treated with ADT, abiraterone, EBRT from May 2021 through September 2023.  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      Further prostate cancer treatment history:  May 2021 met with medical oncology and radiation oncology started ADT 5/13/2021 Baseline PSA 10.4   6/11/2021 PSA 1.5 continued ADT  9/3/2021 PSA 0.35 continue ADT  10/18/2021 orders placed for abiraterone/prednisone based on updated results from [facility]  12/3/21 PSA 0.02 on ADT and abiraterone/prednisone  1/14/2022 PSA 0.01 on ADT and abiraterone/prednisone  3/4/2022 PSA 0.01 on ADT and abiraterone/prednisone  6/6/2022 PSA 0.01 on ADT and abiraterone/prednisone  7/18/2022 PSA 0.01 on ADT and abiraterone/prednisone  9/8/2022 PSA 0.01 on ADT and abiraterone/prednisone  12/13/2022 PSA 0.01 on ADT and abiraterone/prednisone  3/14/2023 PSA 0.01 on ADT and abiraterone/prednisone  6/13/2023 PSA 0.01 on ADT and abiraterone/prednisone  9/12/2023 PSA 0.01 finishing ADT and abiraterone/prednisone  12/12/23 PSA 0 off treatment  3/12/24 PSA 0 off treatment  6/14/24 PSA undetectable   9/13/24: PSA undetectable  12/13/24:  PSA undetectable  3/14/25: PSA remains undetectable    6/23/25: PSA remains undetectable for therapy.  Hemoglobin 6 recurrent severe MEHDI.    Interval history:  He has been feeling much more fatigued and is in a wheelchair today.   He denies explicit shortness of breath, chest pain palpitations presyncopal symptoms.  Continues to have intermittent GI bleeding from known radiation proctitis.    Current Medications:    Current Outpatient Medications:     amLODIPine Besylate-Valsartan 5-160 MG Oral Tab, Take 1 tablet by mouth daily., Disp: 90 tablet, Rfl: 1    pantoprazole 20 MG Oral Tab EC, Take 1 tablet (20 mg total) by mouth daily., Disp: 90 tablet, Rfl: 1    Glucosamine 500 MG Oral Cap, Take by mouth., Disp: , Rfl:     melatonin 5 MG Oral Cap, Take 1 capsule (5 mg total) by mouth nightly., Disp: , Rfl:     Current Outpatient Medications on File Prior to Visit   Medication Sig Dispense Refill    amLODIPine Besylate-Valsartan 5-160 MG Oral Tab Take 1 tablet by mouth daily. 90 tablet 1    pantoprazole 20 MG Oral Tab EC Take 1 tablet (20 mg total) by mouth daily. 90 tablet 1    Glucosamine 500 MG Oral Cap Take by mouth.      melatonin 5 MG Oral Cap Take 1 capsule (5 mg total) by mouth nightly.       No current facility-administered medications on file prior to visit.     Allergies:  No Known Allergies     Review of Systems:  All other systems reviewed and negative x12    Vital Signs:  /60 (BP Location: Left arm, Patient Position: Sitting, Cuff Size: adult)   Pulse 94   Temp 96.5 °F (35.8 °C) (Tympanic)   Resp 18   Ht 1.727 m (5' 8\")   Wt 64 kg (141 lb)   SpO2 100%   BMI 21.44 kg/m²     Physical Examination:  General: Patient is alert and oriented x 3, not in acute distress.  Psych:  Mood and affect appropriate  HEENT: EOMs intact. PERRL. Oropharynx is clear.   Chest: Symmetric expansion, nonlabored breathing, CTA  Cardiovascular: Tachycardic, regular rhythm, no murmur  Abdomen: Soft, non tender, non distended, bs+  Extremities: No edema.  Neurological: DTRs grossly intact, cane      Laboratory:  WBC: 3.7, done on 6/23/2025.  HGB: 6, done on 6/23/2025.  PLT: 227, done on 6/23/2025.     Lab Results   Component Value Date     (H)  blood in stool, constipation, diarrhea, nausea, rectal pain and vomiting. Endocrine: Negative for cold intolerance, heat intolerance, polydipsia, polyphagia and polyuria. Genitourinary: Negative for decreased urine volume, difficulty urinating, dysuria, flank pain, frequency, genital sores, hematuria and urgency. Musculoskeletal: Negative for arthralgias, back pain, gait problem, joint swelling, myalgias, neck pain and neck stiffness. Skin: Negative for color change, rash and wound. Allergic/Immunologic: Negative for environmental allergies and food allergies. Neurological: Negative for dizziness, tremors, seizures, syncope, facial asymmetry, speech difficulty, weakness, light-headedness, numbness and headaches. Hematological: Negative for adenopathy. Does not bruise/bleed easily. Psychiatric/Behavioral: Negative for agitation, confusion, decreased concentration, hallucinations, self-injury, sleep disturbance and suicidal ideas. The patient is not nervous/anxious. Prior to Visit Medications    Medication Sig Taking? Authorizing Provider   phentermine (ADIPEX-P) 37.5 MG tablet Take 1 tablet by mouth every morning (before breakfast) for 30 days.   Suellen Ocampo MD   citalopram (CELEXA) 20 MG tablet Take 1 tablet by mouth daily  Suellen Ocampo MD   Omeprazole Magnesium (PRILOSEC OTC PO) Take by mouth  Historical Provider, MD        Allergies   Allergen Reactions    Erythromycin        Past Medical History:   Diagnosis Date    Indigestion     Morbid obesity ([address] 2020    Ovarian cyst     Palpitations 2020    PVC (premature ventricular contraction) 2020       Past Surgical History:   Procedure Laterality Date     SECTION      LAPAROTOMY  2016    LAPAROTOMY LYSIS OF ADHESIONS performed by Blayne Islas MD at [facility] 90 2016    [facility] POSS BSO  performed by Blayne Islas MD at [address] 06/23/2025    BUN 13 06/23/2025    BUNCREA 10.2 06/23/2025    CREATSERUM 1.28 06/23/2025    ANIONGAP 12 06/23/2025    GFRNAA 95 07/18/2022    GFRAA 110 07/18/2022    CA 8.5 (L) 06/23/2025    OSMOCALC 273 (L) 06/23/2025    ALKPHO 63 06/23/2025    AST 36 (H) 06/23/2025    ALT 23 06/23/2025    BILT 0.2 06/23/2025    TP 7.2 06/23/2025    ALB 4.3 06/23/2025    GLOBULIN 2.9 06/23/2025     (L) 06/23/2025    K 3.8 06/23/2025     06/23/2025    CO2 18.0 (L) 06/23/2025           Impression and Plan:  74 year old with a family history of BRCA1 germ line mutation, who follows with medical oncology regarding clinically localized high risk prostate cancer who was treated with ADT, abiraterone, EBRT from May 2021 through September 2023.  He also carries a diagnosis of stage I ([id number]) adenocarcinoma of the rectosigmoid colon and underwent robotic assisted LAR 7/2/21.  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  PSA remains undetectable.  We will continue to follow with surveillance PSAs now that he is off therapy.  Genetics referral previously placed    Iron deficiency anemia.  Recurrent MEHDI, he last received 1 g iron dextran July 2024.  Orders placed an appointment in March this was not scheduled.  Unfortunately, he now has severe iron deficiency anemia, hemoglobin 6.  We have been able to secure a place for blood transfusion tomorrow as well as 1 g IV iron dextran.  He may very well require more iron but we will see him back in approximately 6 weeks to ensure improvement and reassess iron indices.    Stage I adenocarcinoma of the rectosigmoid colon.  No further imaging indicated  Endoscopic evaluation scheduled in April 2025 per patient    HTN  Improved while in clinic, in July 2024, CCB/ARB discontinued since low BP readings, follow-up with PCP for management    Follow-up in about 6 weeks to assess iron indices.  At that time we  Marital status:      Spouse name: Not on file    Number of children: Not on file    Years of education: Not on file    Highest education level: Not on file   Occupational History    Not on file   Tobacco Use    Smoking status: Former Smoker     Quit date:      Years since quittin.8    Smokeless tobacco: Never Used   Substance and Sexual Activity    Alcohol use: Yes     Alcohol/week: 0.0 standard drinks     Comment: holidays    Drug use: No    Sexual activity: Not Currently   Other Topics Concern    Not on file   Social History Narrative    Not on file     Social Determinants of Health     Financial Resource Strain: Low Risk     Difficulty of Paying Living Expenses: Not hard at all   Food Insecurity: No Food Insecurity    Worried About Running Out of Food in the Last Year: Never true    [address] in the Last Year: Never true   Transportation Needs:     Lack of Transportation (Medical):  Lack of Transportation (Non-Medical):    Physical Activity:     Days of Exercise per Week:     Minutes of Exercise per Session:    Stress:     Feeling of Stress :    Social Connections:     Frequency of Communication with Friends and Family:     Frequency of Social Gatherings with Friends and Family:     Attends Yazidism Services:     Active Member of Clubs or Organizations:     Attends Club or Organization Meetings:     Marital Status:    Intimate Partner Violence:     Fear of Current or Ex-Partner:     Emotionally Abused:     Physically Abused:     Sexually Abused:         Family History   Problem Relation Age of Onset    Cancer Mother     Diabetes Mother     Heart Disease Mother     Atrial Fibrillation Mother     Other Father     Asthma Sister     No Known Problems Son        There were no vitals filed for this visit. Estimated body mass index is 42.69 kg/m² as calculated from the following:    Height as of 21: 5' 3\" (1.6 m).     Weight as of 21: 241 lb (109.3 can hopefully return to every 3-month interval follow-ups.    55 minutes were spent in patient discussion, coordination of care, record review, lab review, imaging review. The diagnosis, prognosis, and general treatment was explained and all questions answered.     Nathan Laguna,     [facility]  [facility]     This note was created using a voice-recognition transcribing system. Incorrect words or phrases may have been missed during proofreading. Please interpret accordingly.

## 2025-06-24 NOTE — PROGRESS NOTES
Patient arrives to infusion center for blood transfusion. Hgb= 6. Patient to receive 1 unit. Patient consent for blood products obtained. Reviewed transfusion process. Patient appeared to tolerate transfusion well. Tylenol given PO as premedication. No S/S of adverse reaction noted throughout. See associated blood administration flowsheet for vitals and rate changes. Patient also arrives to infusion for iron dextran. Patient denies any issues or concerns. Patient received iron dextran test dose in 6/4/25, no test dose needed at this time. Patient BP low upon arrival to infusion center asymptomatic. MD notified of low BP, OK to infuse PRBCs but benadryl deferred. After completion of 1 unit PRBC VSS. At end of iron dextran infusion vital signs continue to be stable.      Ordering Provider: uLz Elena JACOBS  Order Expires: at completion of dose      Patient appeared to tolerate both infusions without difficulty or complaint. No s/s of adverse reaction noted. Patient well appearing at ND, VSS. Reviewed next apt date/time:     Education Record  Learner:  Patient  Disease / Diagnosis: iron deficiency anemia due to chronic blood loss  Barriers / Limitations:  None  Method:  Discussion  General Topics:  Side effects and symptom management and Plan of care reviewed  Outcome:  Shows understanding

## 2025-06-25 LAB
BLOOD TYPE BARCODE: 5100
UNIT VOLUME: 350 ML

## 2025-07-11 DIAGNOSIS — K21.9 GASTROESOPHAGEAL REFLUX DISEASE WITHOUT ESOPHAGITIS: ICD-10-CM

## 2025-07-15 RX ORDER — PANTOPRAZOLE SODIUM 20 MG/1
20 TABLET, DELAYED RELEASE ORAL DAILY
Qty: 90 TABLET | Refills: 3 | Status: SHIPPED | OUTPATIENT
Start: 2025-07-15

## 2025-07-16 DIAGNOSIS — I10 ESSENTIAL HYPERTENSION: ICD-10-CM

## 2025-07-21 RX ORDER — AMLODIPINE AND VALSARTAN 5; 160 MG/1; MG/1
1 TABLET ORAL DAILY
Qty: 90 TABLET | Refills: 3 | Status: SHIPPED | OUTPATIENT
Start: 2025-07-21

## 2025-08-01 DIAGNOSIS — C61 PROSTATE CANCER (HCC): ICD-10-CM

## 2025-08-01 DIAGNOSIS — D50.0 IRON DEFICIENCY ANEMIA DUE TO CHRONIC BLOOD LOSS: ICD-10-CM

## 2025-08-01 DIAGNOSIS — Z85.048 HISTORY OF CANCER OF RECTOSIGMOID JUNCTION: ICD-10-CM

## 2025-08-04 ENCOUNTER — OFFICE VISIT (OUTPATIENT)
Facility: LOCATION | Age: 75
End: 2025-08-04
Attending: STUDENT IN AN ORGANIZED HEALTH CARE EDUCATION/TRAINING PROGRAM

## 2025-08-04 ENCOUNTER — HOSPITAL ENCOUNTER (INPATIENT)
Facility: HOSPITAL | Age: 75
LOS: 4 days | Discharge: HOME HEALTH | End: 2025-08-08
Attending: EMERGENCY MEDICINE | Admitting: HOSPITALIST

## 2025-08-04 ENCOUNTER — HOSPITAL ENCOUNTER (INPATIENT)
Facility: HOSPITAL | Age: 75
LOS: 4 days | Discharge: HOME | End: 2025-08-08
Attending: EMERGENCY MEDICINE | Admitting: HOSPITALIST

## 2025-08-04 ENCOUNTER — DOCUMENTATION ONLY (OUTPATIENT)
Facility: LOCATION | Age: 75
End: 2025-08-04

## 2025-08-04 ENCOUNTER — TELEPHONE (OUTPATIENT)
Facility: LOCATION | Age: 75
End: 2025-08-04

## 2025-08-04 VITALS
SYSTOLIC BLOOD PRESSURE: 109 MMHG | BODY MASS INDEX: 21.52 KG/M2 | DIASTOLIC BLOOD PRESSURE: 65 MMHG | TEMPERATURE: 98 F | HEART RATE: 109 BPM | HEIGHT: 68 IN | OXYGEN SATURATION: 99 % | RESPIRATION RATE: 18 BRPM | WEIGHT: 142 LBS

## 2025-08-04 DIAGNOSIS — K62.7 RADIATION INDUCED PROCTITIS: ICD-10-CM

## 2025-08-04 DIAGNOSIS — D50.0 IRON DEFICIENCY ANEMIA DUE TO CHRONIC BLOOD LOSS: ICD-10-CM

## 2025-08-04 DIAGNOSIS — D50.9 IRON DEFICIENCY ANEMIA, UNSPECIFIED IRON DEFICIENCY ANEMIA TYPE: ICD-10-CM

## 2025-08-04 DIAGNOSIS — E87.1 HYPONATREMIA: Primary | ICD-10-CM

## 2025-08-04 DIAGNOSIS — C61 PROSTATE CANCER (HCC): ICD-10-CM

## 2025-08-04 DIAGNOSIS — K74.00 LIVER FIBROSIS: Primary | ICD-10-CM

## 2025-08-04 DIAGNOSIS — R26.2 IMPAIRED AMBULATION: ICD-10-CM

## 2025-08-04 DIAGNOSIS — K74.00 LIVER FIBROSIS: ICD-10-CM

## 2025-08-04 DIAGNOSIS — C61 PROSTATE CANCER (HCC): Primary | ICD-10-CM

## 2025-08-04 DIAGNOSIS — F10.10 ETOH ABUSE: ICD-10-CM

## 2025-08-04 LAB
ALBUMIN SERPL-MCNC: 4.2 G/DL (ref 3.2–4.8)
ALBUMIN SERPL-MCNC: 4.3 G/DL (ref 3.2–4.8)
ALBUMIN/GLOB SERPL: 1.2 (ref 1–2)
ALBUMIN/GLOB SERPL: 1.4 (ref 1–2)
ALP LIVER SERPL-CCNC: 49 U/L (ref 45–117)
ALP LIVER SERPL-CCNC: 50 U/L (ref 45–117)
ALT SERPL-CCNC: 36 U/L (ref 10–49)
ALT SERPL-CCNC: 39 U/L (ref 10–49)
ANION GAP SERPL CALC-SCNC: 10 MMOL/L (ref 0–18)
ANION GAP SERPL CALC-SCNC: 10 MMOL/L (ref 0–18)
ANTIBODY SCREEN: NEGATIVE
AST SERPL-CCNC: 58 U/L (ref ?–34)
AST SERPL-CCNC: 61 U/L (ref ?–34)
BASOPHILS # BLD AUTO: 0.04 X10(3) UL (ref 0–0.2)
BASOPHILS # BLD AUTO: 0.04 X10(3) UL (ref 0–0.2)
BASOPHILS NFR BLD AUTO: 0.7 %
BASOPHILS NFR BLD AUTO: 0.8 %
BILIRUB SERPL-MCNC: 0.2 MG/DL (ref 0.2–1.1)
BILIRUB SERPL-MCNC: 0.3 MG/DL (ref 0.2–1.1)
BUN BLD-MCNC: 10 MG/DL (ref 9–23)
BUN BLD-MCNC: 10 MG/DL (ref 9–23)
BUN/CREAT SERPL: 11.5 (ref 10–20)
BUN/CREAT SERPL: 11.5 (ref 10–20)
CALCIUM BLD-MCNC: 8.7 MG/DL (ref 8.7–10.4)
CALCIUM BLD-MCNC: 9 MG/DL (ref 8.7–10.4)
CHLORIDE SERPL-SCNC: 96 MMOL/L (ref 98–112)
CHLORIDE SERPL-SCNC: 97 MMOL/L (ref 98–112)
CO2 SERPL-SCNC: 18 MMOL/L (ref 21–32)
CO2 SERPL-SCNC: 18 MMOL/L (ref 21–32)
CREAT BLD-MCNC: 0.87 MG/DL (ref 0.7–1.3)
CREAT BLD-MCNC: 0.87 MG/DL (ref 0.7–1.3)
DEPRECATED HBV CORE AB SER IA-ACNC: 262 NG/ML (ref 50–336)
DEPRECATED RDW RBC AUTO: 68.1 FL (ref 35.1–46.3)
DEPRECATED RDW RBC AUTO: 69.7 FL (ref 35.1–46.3)
EGFRCR SERPLBLD CKD-EPI 2021: 91 ML/MIN/1.73M2 (ref 60–?)
EGFRCR SERPLBLD CKD-EPI 2021: 91 ML/MIN/1.73M2 (ref 60–?)
EOSINOPHIL # BLD AUTO: 0.03 X10(3) UL (ref 0–0.7)
EOSINOPHIL # BLD AUTO: 0.08 X10(3) UL (ref 0–0.7)
EOSINOPHIL NFR BLD AUTO: 0.6 %
EOSINOPHIL NFR BLD AUTO: 1.4 %
ERYTHROCYTE [DISTWIDTH] IN BLOOD BY AUTOMATED COUNT: 20.1 % (ref 11–15)
ERYTHROCYTE [DISTWIDTH] IN BLOOD BY AUTOMATED COUNT: 20.4 % (ref 11–15)
ETHANOL SERPL-MCNC: 222 MG/DL (ref ?–3)
FASTING STATUS PATIENT QL REPORTED: NO
GLOBULIN PLAS-MCNC: 3.1 G/DL (ref 2–3.5)
GLOBULIN PLAS-MCNC: 3.5 G/DL (ref 2–3.5)
GLUCOSE BLD-MCNC: 97 MG/DL (ref 70–99)
GLUCOSE BLD-MCNC: 99 MG/DL (ref 70–99)
HCT VFR BLD AUTO: 27 % (ref 39–53)
HCT VFR BLD AUTO: 27.9 % (ref 39–53)
HGB BLD-MCNC: 9 G/DL (ref 13–17.5)
HGB BLD-MCNC: 9.4 G/DL (ref 13–17.5)
IMM GRANULOCYTES # BLD AUTO: 0.03 X10(3) UL (ref 0–1)
IMM GRANULOCYTES # BLD AUTO: 0.05 X10(3) UL (ref 0–1)
IMM GRANULOCYTES NFR BLD: 0.6 %
IMM GRANULOCYTES NFR BLD: 0.9 %
IRON SATN MFR SERPL: 40 % (ref 20–50)
IRON SERPL-MCNC: 87 UG/DL (ref 65–175)
LYMPHOCYTES # BLD AUTO: 0.7 X10(3) UL (ref 1–4)
LYMPHOCYTES # BLD AUTO: 0.88 X10(3) UL (ref 1–4)
LYMPHOCYTES NFR BLD AUTO: 14.6 %
LYMPHOCYTES NFR BLD AUTO: 15.4 %
MAGNESIUM SERPL-MCNC: 1.2 MG/DL (ref 1.6–2.6)
MCH RBC QN AUTO: 31 PG (ref 26–34)
MCH RBC QN AUTO: 31.1 PG (ref 26–34)
MCHC RBC AUTO-ENTMCNC: 33.3 G/DL (ref 31–37)
MCHC RBC AUTO-ENTMCNC: 33.7 G/DL (ref 31–37)
MCV RBC AUTO: 92.1 FL (ref 80–100)
MCV RBC AUTO: 93.4 FL (ref 80–100)
MONOCYTES # BLD AUTO: 0.52 X10(3) UL (ref 0.1–1)
MONOCYTES # BLD AUTO: 0.78 X10(3) UL (ref 0.1–1)
MONOCYTES NFR BLD AUTO: 10.8 %
MONOCYTES NFR BLD AUTO: 13.7 %
NEUTROPHILS # BLD AUTO: 3.49 X10 (3) UL (ref 1.5–7.7)
NEUTROPHILS # BLD AUTO: 3.49 X10(3) UL (ref 1.5–7.7)
NEUTROPHILS # BLD AUTO: 3.88 X10 (3) UL (ref 1.5–7.7)
NEUTROPHILS # BLD AUTO: 3.88 X10(3) UL (ref 1.5–7.7)
NEUTROPHILS NFR BLD AUTO: 67.9 %
NEUTROPHILS NFR BLD AUTO: 72.6 %
OSMOLALITY SERPL CALC.SUM OF ELEC: 257 MOSM/KG (ref 275–295)
OSMOLALITY SERPL CALC.SUM OF ELEC: 259 MOSM/KG (ref 275–295)
PLATELET # BLD AUTO: 171 10(3)UL (ref 150–450)
PLATELET # BLD AUTO: 194 10(3)UL (ref 150–450)
PLATELET MORPHOLOGY: NORMAL
POTASSIUM SERPL-SCNC: 4.5 MMOL/L (ref 3.5–5.1)
POTASSIUM SERPL-SCNC: 4.6 MMOL/L (ref 3.5–5.1)
PROT SERPL-MCNC: 7.3 G/DL (ref 5.7–8.2)
PROT SERPL-MCNC: 7.8 G/DL (ref 5.7–8.2)
RBC # BLD AUTO: 2.89 X10(6)UL (ref 3.8–5.8)
RBC # BLD AUTO: 3.03 X10(6)UL (ref 3.8–5.8)
RH BLOOD TYPE: POSITIVE
SODIUM SERPL-SCNC: 124 MMOL/L (ref 136–145)
SODIUM SERPL-SCNC: 125 MMOL/L (ref 136–145)
TOTAL IRON BINDING CAPACITY: 215 UG/DL (ref 250–425)
TRANSFERRIN SERPL-MCNC: 158 MG/DL (ref 215–365)
WBC # BLD AUTO: 4.8 X10(3) UL (ref 4–11)
WBC # BLD AUTO: 5.7 X10(3) UL (ref 4–11)

## 2025-08-04 PROCEDURE — HZ2ZZZZ DETOXIFICATION SERVICES FOR SUBSTANCE ABUSE TREATMENT: ICD-10-PCS | Performed by: HOSPITALIST

## 2025-08-04 PROCEDURE — 99223 1ST HOSP IP/OBS HIGH 75: CPT | Performed by: HOSPITALIST

## 2025-08-04 RX ORDER — MELATONIN
100 DAILY
Status: DISCONTINUED | OUTPATIENT
Start: 2025-08-05 | End: 2025-08-08

## 2025-08-04 RX ORDER — AMLODIPINE AND VALSARTAN 5; 160 MG/1; MG/1
1 TABLET ORAL DAILY
Status: CANCELLED | OUTPATIENT
Start: 2025-08-04

## 2025-08-04 RX ORDER — SODIUM CHLORIDE 9 MG/ML
INJECTION, SOLUTION INTRAVENOUS CONTINUOUS
Status: DISCONTINUED | OUTPATIENT
Start: 2025-08-04 | End: 2025-08-07

## 2025-08-04 RX ORDER — ONDANSETRON 2 MG/ML
4 INJECTION INTRAMUSCULAR; INTRAVENOUS EVERY 6 HOURS PRN
Status: DISCONTINUED | OUTPATIENT
Start: 2025-08-04 | End: 2025-08-08

## 2025-08-04 RX ORDER — METOCLOPRAMIDE HYDROCHLORIDE 5 MG/ML
10 INJECTION INTRAMUSCULAR; INTRAVENOUS EVERY 8 HOURS PRN
Status: DISCONTINUED | OUTPATIENT
Start: 2025-08-04 | End: 2025-08-08

## 2025-08-04 RX ORDER — LORAZEPAM 0.5 MG/1
2 TABLET ORAL
Status: DISCONTINUED | OUTPATIENT
Start: 2025-08-04 | End: 2025-08-08

## 2025-08-04 RX ORDER — SODIUM CHLORIDE 9 MG/ML
INJECTION, SOLUTION INTRAVENOUS CONTINUOUS
Status: DISCONTINUED | OUTPATIENT
Start: 2025-08-04 | End: 2025-08-05

## 2025-08-04 RX ORDER — PANTOPRAZOLE SODIUM 40 MG/1
40 TABLET, DELAYED RELEASE ORAL
Status: DISCONTINUED | OUTPATIENT
Start: 2025-08-05 | End: 2025-08-05

## 2025-08-04 RX ORDER — LORAZEPAM 0.5 MG/1
1 TABLET ORAL
Status: DISCONTINUED | OUTPATIENT
Start: 2025-08-04 | End: 2025-08-08

## 2025-08-04 RX ORDER — FOLIC ACID 1 MG/1
1 TABLET ORAL DAILY
Status: DISCONTINUED | OUTPATIENT
Start: 2025-08-04 | End: 2025-08-08

## 2025-08-04 RX ORDER — THIAMINE HYDROCHLORIDE 100 MG/ML
100 INJECTION, SOLUTION INTRAMUSCULAR; INTRAVENOUS ONCE
Status: COMPLETED | OUTPATIENT
Start: 2025-08-04 | End: 2025-08-04

## 2025-08-04 RX ORDER — MIDAZOLAM HYDROCHLORIDE 1 MG/ML
2 INJECTION INTRAMUSCULAR; INTRAVENOUS ONCE
Status: COMPLETED | OUTPATIENT
Start: 2025-08-04 | End: 2025-08-04

## 2025-08-04 RX ORDER — DOXEPIN HYDROCHLORIDE 50 MG/1
1 CAPSULE ORAL DAILY
Status: DISCONTINUED | OUTPATIENT
Start: 2025-08-04 | End: 2025-08-08

## 2025-08-04 RX ORDER — ACETAMINOPHEN 500 MG
500 TABLET ORAL EVERY 4 HOURS PRN
Status: DISCONTINUED | OUTPATIENT
Start: 2025-08-04 | End: 2025-08-08

## 2025-08-04 RX ORDER — MAGNESIUM OXIDE 400 MG/1
800 TABLET ORAL ONCE
Status: COMPLETED | OUTPATIENT
Start: 2025-08-04 | End: 2025-08-04

## 2025-08-05 ENCOUNTER — APPOINTMENT (OUTPATIENT)
Dept: CT IMAGING | Facility: HOSPITAL | Age: 75
End: 2025-08-05
Attending: PHYSICIAN ASSISTANT

## 2025-08-05 LAB
ALBUMIN SERPL-MCNC: 4 G/DL (ref 3.2–4.8)
ALBUMIN/GLOB SERPL: 1.4 (ref 1–2)
ALP LIVER SERPL-CCNC: 48 U/L (ref 45–117)
ALT SERPL-CCNC: 31 U/L (ref 10–49)
ANION GAP SERPL CALC-SCNC: 7 MMOL/L (ref 0–18)
AST SERPL-CCNC: 47 U/L (ref ?–34)
BASOPHILS # BLD AUTO: 0.02 X10(3) UL (ref 0–0.2)
BASOPHILS NFR BLD AUTO: 0.5 %
BILIRUB SERPL-MCNC: 0.5 MG/DL (ref 0.2–1.1)
BUN BLD-MCNC: 9 MG/DL (ref 9–23)
BUN/CREAT SERPL: 13 (ref 10–20)
CALCIUM BLD-MCNC: 8.6 MG/DL (ref 8.7–10.4)
CHLORIDE SERPL-SCNC: 100 MMOL/L (ref 98–112)
CO2 SERPL-SCNC: 20 MMOL/L (ref 21–32)
CREAT BLD-MCNC: 0.69 MG/DL (ref 0.7–1.3)
DEPRECATED RDW RBC AUTO: 69.1 FL (ref 35.1–46.3)
EGFRCR SERPLBLD CKD-EPI 2021: 98 ML/MIN/1.73M2 (ref 60–?)
EOSINOPHIL # BLD AUTO: 0.04 X10(3) UL (ref 0–0.7)
EOSINOPHIL NFR BLD AUTO: 1 %
ERYTHROCYTE [DISTWIDTH] IN BLOOD BY AUTOMATED COUNT: 20.6 % (ref 11–15)
GLOBULIN PLAS-MCNC: 2.9 G/DL (ref 2–3.5)
GLUCOSE BLD-MCNC: 110 MG/DL (ref 70–99)
HCT VFR BLD AUTO: 24 % (ref 39–53)
HCT VFR BLD AUTO: 24.3 % (ref 39–53)
HCT VFR BLD AUTO: 24.8 % (ref 39–53)
HGB BLD-MCNC: 8.1 G/DL (ref 13–17.5)
HGB BLD-MCNC: 8.2 G/DL (ref 13–17.5)
HGB BLD-MCNC: 8.3 G/DL (ref 13–17.5)
IMM GRANULOCYTES # BLD AUTO: 0.01 X10(3) UL (ref 0–1)
IMM GRANULOCYTES NFR BLD: 0.2 %
LYMPHOCYTES # BLD AUTO: 0.4 X10(3) UL (ref 1–4)
LYMPHOCYTES NFR BLD AUTO: 9.8 %
MAGNESIUM SERPL-MCNC: 1.2 MG/DL (ref 1.6–2.6)
MCH RBC QN AUTO: 31.3 PG (ref 26–34)
MCHC RBC AUTO-ENTMCNC: 33.8 G/DL (ref 31–37)
MCV RBC AUTO: 92.7 FL (ref 80–100)
MONOCYTES # BLD AUTO: 0.55 X10(3) UL (ref 0.1–1)
MONOCYTES NFR BLD AUTO: 13.4 %
NEUTROPHILS # BLD AUTO: 3.08 X10 (3) UL (ref 1.5–7.7)
NEUTROPHILS # BLD AUTO: 3.08 X10(3) UL (ref 1.5–7.7)
NEUTROPHILS NFR BLD AUTO: 75.1 %
OSMOLALITY SERPL CALC.SUM OF ELEC: 263 MOSM/KG (ref 275–295)
PLATELET # BLD AUTO: 141 10(3)UL (ref 150–450)
POTASSIUM SERPL-SCNC: 4.4 MMOL/L (ref 3.5–5.1)
PROT SERPL-MCNC: 6.9 G/DL (ref 5.7–8.2)
RBC # BLD AUTO: 2.59 X10(6)UL (ref 3.8–5.8)
SODIUM SERPL-SCNC: 127 MMOL/L (ref 136–145)
WBC # BLD AUTO: 4.1 X10(3) UL (ref 4–11)

## 2025-08-05 PROCEDURE — 99233 SBSQ HOSP IP/OBS HIGH 50: CPT | Performed by: INTERNAL MEDICINE

## 2025-08-05 PROCEDURE — 74177 CT ABD & PELVIS W/CONTRAST: CPT | Performed by: PHYSICIAN ASSISTANT

## 2025-08-05 PROCEDURE — 99223 1ST HOSP IP/OBS HIGH 75: CPT | Performed by: INTERNAL MEDICINE

## 2025-08-05 RX ORDER — SODIUM CHLORIDE 9 MG/ML
INJECTION, SOLUTION INTRAVENOUS CONTINUOUS
Status: DISCONTINUED | OUTPATIENT
Start: 2025-08-05 | End: 2025-08-06

## 2025-08-05 RX ORDER — MAGNESIUM OXIDE 400 MG/1
800 TABLET ORAL ONCE
Status: COMPLETED | OUTPATIENT
Start: 2025-08-05 | End: 2025-08-05

## 2025-08-05 RX ORDER — DIPHENHYDRAMINE HYDROCHLORIDE 50 MG/ML
25 INJECTION, SOLUTION INTRAMUSCULAR; INTRAVENOUS EVERY 4 HOURS PRN
Status: DISCONTINUED | OUTPATIENT
Start: 2025-08-05 | End: 2025-08-08

## 2025-08-05 RX ORDER — PANTOPRAZOLE SODIUM 40 MG/1
40 TABLET, DELAYED RELEASE ORAL
Status: DISCONTINUED | OUTPATIENT
Start: 2025-08-05 | End: 2025-08-08

## 2025-08-05 RX ORDER — CODEINE PHOSPHATE AND GUAIFENESIN 10; 100 MG/5ML; MG/5ML
5 SOLUTION ORAL EVERY 4 HOURS PRN
Status: DISCONTINUED | OUTPATIENT
Start: 2025-08-05 | End: 2025-08-08

## 2025-08-06 ENCOUNTER — PATIENT MESSAGE (OUTPATIENT)
Age: 75
End: 2025-08-06

## 2025-08-06 LAB
ANION GAP SERPL CALC-SCNC: 11 MMOL/L (ref 0–18)
ANION GAP SERPL CALC-SCNC: 7 MMOL/L (ref 0–18)
ATRIAL RATE: 112 BPM
BUN BLD-MCNC: 6 MG/DL (ref 9–23)
BUN BLD-MCNC: 8 MG/DL (ref 9–23)
BUN/CREAT SERPL: 10.7 (ref 10–20)
BUN/CREAT SERPL: 7.9 (ref 10–20)
CALCIUM BLD-MCNC: 8.2 MG/DL (ref 8.7–10.4)
CALCIUM BLD-MCNC: 8.6 MG/DL (ref 8.7–10.4)
CHLORIDE SERPL-SCNC: 100 MMOL/L (ref 98–112)
CHLORIDE SERPL-SCNC: 98 MMOL/L (ref 98–112)
CO2 SERPL-SCNC: 22 MMOL/L (ref 21–32)
CO2 SERPL-SCNC: 23 MMOL/L (ref 21–32)
CREAT BLD-MCNC: 0.75 MG/DL (ref 0.7–1.3)
CREAT BLD-MCNC: 0.76 MG/DL (ref 0.7–1.3)
DEPRECATED HBV CORE AB SER IA-ACNC: 225 NG/ML (ref 50–336)
DEPRECATED RDW RBC AUTO: 71.2 FL (ref 35.1–46.3)
EGFRCR SERPLBLD CKD-EPI 2021: 95 ML/MIN/1.73M2 (ref 60–?)
EGFRCR SERPLBLD CKD-EPI 2021: 95 ML/MIN/1.73M2 (ref 60–?)
ERYTHROCYTE [DISTWIDTH] IN BLOOD BY AUTOMATED COUNT: 20.8 % (ref 11–15)
GLUCOSE BLD-MCNC: 104 MG/DL (ref 70–99)
GLUCOSE BLD-MCNC: 114 MG/DL (ref 70–99)
HCT VFR BLD AUTO: 23.6 % (ref 39–53)
HGB BLD-MCNC: 8.1 G/DL (ref 13–17.5)
IRON SATN MFR SERPL: 41 % (ref 20–50)
IRON SERPL-MCNC: 93 UG/DL (ref 65–175)
MAGNESIUM SERPL-MCNC: 1.2 MG/DL (ref 1.6–2.6)
MCH RBC QN AUTO: 32.1 PG (ref 26–34)
MCHC RBC AUTO-ENTMCNC: 34.3 G/DL (ref 31–37)
MCV RBC AUTO: 93.7 FL (ref 80–100)
OSMOLALITY SERPL CALC.SUM OF ELEC: 269 MOSM/KG (ref 275–295)
OSMOLALITY SERPL CALC.SUM OF ELEC: 270 MOSM/KG (ref 275–295)
P AXIS: 27 DEGREES
P-R INTERVAL: 168 MS
PLATELET # BLD AUTO: 128 10(3)UL (ref 150–450)
POTASSIUM SERPL-SCNC: 3.7 MMOL/L (ref 3.5–5.1)
POTASSIUM SERPL-SCNC: 4.2 MMOL/L (ref 3.5–5.1)
Q-T INTERVAL: 318 MS
QRS DURATION: 88 MS
QTC CALCULATION (BEZET): 434 MS
R AXIS: -5 DEGREES
RBC # BLD AUTO: 2.52 X10(6)UL (ref 3.8–5.8)
SODIUM SERPL-SCNC: 130 MMOL/L (ref 136–145)
SODIUM SERPL-SCNC: 131 MMOL/L (ref 136–145)
T AXIS: 51 DEGREES
T4 FREE SERPL-MCNC: 1.3 NG/DL (ref 0.8–1.7)
TOTAL IRON BINDING CAPACITY: 225 UG/DL (ref 250–425)
TRANSFERRIN SERPL-MCNC: 169 MG/DL (ref 215–365)
TSI SER-ACNC: 10.54 UIU/ML (ref 0.55–4.78)
VENTRICULAR RATE: 112 BPM
WBC # BLD AUTO: 3 X10(3) UL (ref 4–11)

## 2025-08-06 PROCEDURE — 99233 SBSQ HOSP IP/OBS HIGH 50: CPT | Performed by: HOSPITALIST

## 2025-08-06 PROCEDURE — 99233 SBSQ HOSP IP/OBS HIGH 50: CPT | Performed by: STUDENT IN AN ORGANIZED HEALTH CARE EDUCATION/TRAINING PROGRAM

## 2025-08-06 RX ORDER — MAGNESIUM OXIDE 400 MG/1
800 TABLET ORAL ONCE
Status: COMPLETED | OUTPATIENT
Start: 2025-08-06 | End: 2025-08-06

## 2025-08-06 RX ORDER — LEVOTHYROXINE SODIUM 25 UG/1
25 TABLET ORAL
Status: DISCONTINUED | OUTPATIENT
Start: 2025-08-07 | End: 2025-08-08

## 2025-08-07 ENCOUNTER — ANESTHESIA (OUTPATIENT)
Dept: ENDOSCOPY | Facility: HOSPITAL | Age: 75
End: 2025-08-07

## 2025-08-07 ENCOUNTER — TELEPHONE (OUTPATIENT)
Facility: CLINIC | Age: 75
End: 2025-08-07

## 2025-08-07 ENCOUNTER — ANESTHESIA EVENT (OUTPATIENT)
Dept: ENDOSCOPY | Facility: HOSPITAL | Age: 75
End: 2025-08-07

## 2025-08-07 PROBLEM — F39 EPISODIC MOOD DISORDER: Status: ACTIVE | Noted: 2025-08-07

## 2025-08-07 LAB
ALBUMIN SERPL-MCNC: 4.2 G/DL (ref 3.2–4.8)
ALBUMIN/GLOB SERPL: 1.4 (ref 1–2)
ALP LIVER SERPL-CCNC: 44 U/L (ref 45–117)
ALT SERPL-CCNC: 25 U/L (ref 10–49)
ANION GAP SERPL CALC-SCNC: 9 MMOL/L (ref 0–18)
ANION GAP SERPL CALC-SCNC: 9 MMOL/L (ref 0–18)
AST SERPL-CCNC: 38 U/L (ref ?–34)
BILIRUB SERPL-MCNC: 0.5 MG/DL (ref 0.2–1.1)
BUN BLD-MCNC: 6 MG/DL (ref 9–23)
BUN BLD-MCNC: 6 MG/DL (ref 9–23)
BUN/CREAT SERPL: 8.6 (ref 10–20)
BUN/CREAT SERPL: 8.6 (ref 10–20)
CALCIUM BLD-MCNC: 8.5 MG/DL (ref 8.7–10.4)
CALCIUM BLD-MCNC: 8.5 MG/DL (ref 8.7–10.4)
CHLORIDE SERPL-SCNC: 100 MMOL/L (ref 98–112)
CHLORIDE SERPL-SCNC: 100 MMOL/L (ref 98–112)
CO2 SERPL-SCNC: 24 MMOL/L (ref 21–32)
CO2 SERPL-SCNC: 24 MMOL/L (ref 21–32)
CREAT BLD-MCNC: 0.7 MG/DL (ref 0.7–1.3)
CREAT BLD-MCNC: 0.7 MG/DL (ref 0.7–1.3)
DEPRECATED RDW RBC AUTO: 70.8 FL (ref 35.1–46.3)
EGFRCR SERPLBLD CKD-EPI 2021: 97 ML/MIN/1.73M2 (ref 60–?)
EGFRCR SERPLBLD CKD-EPI 2021: 97 ML/MIN/1.73M2 (ref 60–?)
ERYTHROCYTE [DISTWIDTH] IN BLOOD BY AUTOMATED COUNT: 20.4 % (ref 11–15)
FOLATE SERPL-MCNC: 10.9 NG/ML (ref 5.4–?)
GLOBULIN PLAS-MCNC: 3.1 G/DL (ref 2–3.5)
GLUCOSE BLD-MCNC: 109 MG/DL (ref 70–99)
GLUCOSE BLD-MCNC: 109 MG/DL (ref 70–99)
HCT VFR BLD AUTO: 23.7 % (ref 39–53)
HGB BLD-MCNC: 7.8 G/DL (ref 13–17.5)
INR BLD: 0.99 (ref 0.8–1.2)
LACTATE SERPL-SCNC: 1.1 MMOL/L (ref 0.5–2)
MAGNESIUM SERPL-MCNC: 1.1 MG/DL (ref 1.6–2.6)
MCH RBC QN AUTO: 31.2 PG (ref 26–34)
MCHC RBC AUTO-ENTMCNC: 32.9 G/DL (ref 31–37)
MCV RBC AUTO: 94.8 FL (ref 80–100)
OSMOLALITY SERPL CALC.SUM OF ELEC: 274 MOSM/KG (ref 275–295)
OSMOLALITY SERPL CALC.SUM OF ELEC: 274 MOSM/KG (ref 275–295)
PLATELET # BLD AUTO: 120 10(3)UL (ref 150–450)
POTASSIUM SERPL-SCNC: 3.7 MMOL/L (ref 3.5–5.1)
POTASSIUM SERPL-SCNC: 3.7 MMOL/L (ref 3.5–5.1)
PROT SERPL-MCNC: 7.3 G/DL (ref 5.7–8.2)
PROTHROMBIN TIME: 13.7 SECONDS (ref 11.6–14.8)
RBC # BLD AUTO: 2.5 X10(6)UL (ref 3.8–5.8)
SODIUM SERPL-SCNC: 133 MMOL/L (ref 136–145)
SODIUM SERPL-SCNC: 133 MMOL/L (ref 136–145)
VIT B12 SERPL-MCNC: 259 PG/ML (ref 211–911)
WBC # BLD AUTO: 3.8 X10(3) UL (ref 4–11)

## 2025-08-07 PROCEDURE — 0W3P8ZZ CONTROL BLEEDING IN GASTROINTESTINAL TRACT, VIA NATURAL OR ARTIFICIAL OPENING ENDOSCOPIC: ICD-10-PCS | Performed by: STUDENT IN AN ORGANIZED HEALTH CARE EDUCATION/TRAINING PROGRAM

## 2025-08-07 PROCEDURE — 99233 SBSQ HOSP IP/OBS HIGH 50: CPT | Performed by: HOSPITALIST

## 2025-08-07 PROCEDURE — 45382 COLONOSCOPY W/CONTROL BLEED: CPT | Performed by: STUDENT IN AN ORGANIZED HEALTH CARE EDUCATION/TRAINING PROGRAM

## 2025-08-07 PROCEDURE — 90792 PSYCH DIAG EVAL W/MED SRVCS: CPT | Performed by: NURSE PRACTITIONER

## 2025-08-07 RX ORDER — SODIUM CHLORIDE, SODIUM LACTATE, POTASSIUM CHLORIDE, CALCIUM CHLORIDE 600; 310; 30; 20 MG/100ML; MG/100ML; MG/100ML; MG/100ML
INJECTION, SOLUTION INTRAVENOUS CONTINUOUS
Status: DISCONTINUED | OUTPATIENT
Start: 2025-08-07 | End: 2025-08-07

## 2025-08-07 RX ORDER — EPHEDRINE SULFATE 50 MG/ML
INJECTION INTRAVENOUS AS NEEDED
Status: DISCONTINUED | OUTPATIENT
Start: 2025-08-07 | End: 2025-08-07 | Stop reason: SURG

## 2025-08-07 RX ORDER — NALOXONE HYDROCHLORIDE 0.4 MG/ML
0.08 INJECTION, SOLUTION INTRAMUSCULAR; INTRAVENOUS; SUBCUTANEOUS ONCE AS NEEDED
Status: DISCONTINUED | OUTPATIENT
Start: 2025-08-07 | End: 2025-08-07 | Stop reason: HOSPADM

## 2025-08-07 RX ORDER — SODIUM CHLORIDE, SODIUM LACTATE, POTASSIUM CHLORIDE, CALCIUM CHLORIDE 600; 310; 30; 20 MG/100ML; MG/100ML; MG/100ML; MG/100ML
INJECTION, SOLUTION INTRAVENOUS CONTINUOUS PRN
Status: DISCONTINUED | OUTPATIENT
Start: 2025-08-07 | End: 2025-08-07 | Stop reason: SURG

## 2025-08-07 RX ORDER — LIDOCAINE HYDROCHLORIDE 10 MG/ML
INJECTION, SOLUTION EPIDURAL; INFILTRATION; INTRACAUDAL; PERINEURAL AS NEEDED
Status: DISCONTINUED | OUTPATIENT
Start: 2025-08-07 | End: 2025-08-07 | Stop reason: SURG

## 2025-08-07 RX ADMIN — LIDOCAINE HYDROCHLORIDE 50 MG: 10 INJECTION, SOLUTION EPIDURAL; INFILTRATION; INTRACAUDAL; PERINEURAL at 10:51:00

## 2025-08-07 RX ADMIN — EPHEDRINE SULFATE 10 MG: 50 INJECTION INTRAVENOUS at 11:14:00

## 2025-08-07 RX ADMIN — EPHEDRINE SULFATE 20 MG: 50 INJECTION INTRAVENOUS at 11:10:00

## 2025-08-07 RX ADMIN — SODIUM CHLORIDE, SODIUM LACTATE, POTASSIUM CHLORIDE, CALCIUM CHLORIDE: 600; 310; 30; 20 INJECTION, SOLUTION INTRAVENOUS at 10:49:00

## 2025-08-07 RX ADMIN — EPHEDRINE SULFATE 10 MG: 50 INJECTION INTRAVENOUS at 11:08:00

## 2025-08-07 RX ADMIN — EPHEDRINE SULFATE 10 MG: 50 INJECTION INTRAVENOUS at 11:05:00

## 2025-08-08 ENCOUNTER — TELEPHONE (OUTPATIENT)
Facility: CLINIC | Age: 75
End: 2025-08-08

## 2025-08-08 VITALS
OXYGEN SATURATION: 100 % | RESPIRATION RATE: 16 BRPM | SYSTOLIC BLOOD PRESSURE: 156 MMHG | WEIGHT: 142 LBS | HEIGHT: 68 IN | HEART RATE: 133 BPM | BODY MASS INDEX: 21.52 KG/M2 | DIASTOLIC BLOOD PRESSURE: 92 MMHG | TEMPERATURE: 98 F

## 2025-08-08 PROBLEM — F10.20 ALCOHOLISM (HCC): Status: ACTIVE | Noted: 2025-08-08

## 2025-08-08 PROBLEM — K62.7 RADIATION PROCTITIS: Status: ACTIVE | Noted: 2025-08-08

## 2025-08-08 LAB
ALBUMIN SERPL-MCNC: 4.4 G/DL (ref 3.2–4.8)
ALP LIVER SERPL-CCNC: 49 U/L (ref 45–117)
ALT SERPL-CCNC: 25 U/L (ref 10–49)
ANION GAP SERPL CALC-SCNC: 9 MMOL/L (ref 0–18)
ANION GAP SERPL CALC-SCNC: 9 MMOL/L (ref 0–18)
AST SERPL-CCNC: 36 U/L (ref ?–34)
BILIRUB DIRECT SERPL-MCNC: 0.2 MG/DL (ref ?–0.3)
BILIRUB SERPL-MCNC: 0.5 MG/DL (ref 0.2–1.1)
BILIRUB UR QL: NEGATIVE
BUN BLD-MCNC: 6 MG/DL (ref 9–23)
BUN BLD-MCNC: <5 MG/DL (ref 9–23)
BUN/CREAT SERPL: 8.2 (ref 10–20)
CALCIUM BLD-MCNC: 9 MG/DL (ref 8.7–10.4)
CALCIUM BLD-MCNC: 9 MG/DL (ref 8.7–10.4)
CHLORIDE SERPL-SCNC: 94 MMOL/L (ref 98–112)
CHLORIDE SERPL-SCNC: 97 MMOL/L (ref 98–112)
CLARITY UR: CLEAR
CO2 SERPL-SCNC: 23 MMOL/L (ref 21–32)
CO2 SERPL-SCNC: 24 MMOL/L (ref 21–32)
CREAT BLD-MCNC: 0.67 MG/DL (ref 0.7–1.3)
CREAT BLD-MCNC: 0.73 MG/DL (ref 0.7–1.3)
DEPRECATED RDW RBC AUTO: 70.9 FL (ref 35.1–46.3)
EGFRCR SERPLBLD CKD-EPI 2021: 96 ML/MIN/1.73M2 (ref 60–?)
EGFRCR SERPLBLD CKD-EPI 2021: 99 ML/MIN/1.73M2 (ref 60–?)
ERYTHROCYTE [DISTWIDTH] IN BLOOD BY AUTOMATED COUNT: 20.5 % (ref 11–15)
GLUCOSE BLD-MCNC: 111 MG/DL (ref 70–99)
GLUCOSE BLD-MCNC: 113 MG/DL (ref 70–99)
GLUCOSE UR-MCNC: NORMAL MG/DL
HCT VFR BLD AUTO: 23.7 % (ref 39–53)
HGB BLD-MCNC: 8 G/DL (ref 13–17.5)
KETONES UR-MCNC: NEGATIVE MG/DL
LEUKOCYTE ESTERASE UR QL STRIP.AUTO: NEGATIVE
MAGNESIUM SERPL-MCNC: 1.7 MG/DL (ref 1.6–2.6)
MAGNESIUM SERPL-MCNC: 1.7 MG/DL (ref 1.6–2.6)
MCH RBC QN AUTO: 32.3 PG (ref 26–34)
MCHC RBC AUTO-ENTMCNC: 33.8 G/DL (ref 31–37)
MCV RBC AUTO: 95.6 FL (ref 80–100)
NITRITE UR QL STRIP.AUTO: NEGATIVE
OSMOLALITY SERPL CALC.SUM OF ELEC: 260 MOSM/KG (ref 275–295)
OSMOLALITY UR: 462 MOSM/KG (ref 300–1300)
PH UR: 7.5 (ref 5–8)
PHOSPHATE SERPL-MCNC: 3.6 MG/DL (ref 2.4–5.1)
PLATELET # BLD AUTO: 132 10(3)UL (ref 150–450)
POTASSIUM SERPL-SCNC: 3.7 MMOL/L (ref 3.5–5.1)
POTASSIUM SERPL-SCNC: 3.9 MMOL/L (ref 3.5–5.1)
PROT SERPL-MCNC: 7.7 G/DL (ref 5.7–8.2)
RBC # BLD AUTO: 2.48 X10(6)UL (ref 3.8–5.8)
SODIUM SERPL-SCNC: 126 MMOL/L (ref 136–145)
SODIUM SERPL-SCNC: 130 MMOL/L (ref 136–145)
SODIUM SERPL-SCNC: 187 MMOL/L
SP GR UR STRIP: 1.01 (ref 1–1.03)
UROBILINOGEN UR STRIP-ACNC: NORMAL
WBC # BLD AUTO: 3.8 X10(3) UL (ref 4–11)

## 2025-08-08 PROCEDURE — 99239 HOSP IP/OBS DSCHRG MGMT >30: CPT | Performed by: HOSPITALIST

## 2025-08-08 PROCEDURE — 99223 1ST HOSP IP/OBS HIGH 75: CPT | Performed by: INTERNAL MEDICINE

## 2025-08-08 PROCEDURE — 99233 SBSQ HOSP IP/OBS HIGH 50: CPT | Performed by: STUDENT IN AN ORGANIZED HEALTH CARE EDUCATION/TRAINING PROGRAM

## 2025-08-08 RX ORDER — LEVOTHYROXINE SODIUM 25 UG/1
25 TABLET ORAL
Qty: 30 TABLET | Refills: 0 | Status: SHIPPED | OUTPATIENT
Start: 2025-08-08 | End: 2025-08-14

## 2025-08-08 RX ORDER — AMLODIPINE BESYLATE 5 MG/1
5 TABLET ORAL DAILY
Status: DISCONTINUED | OUTPATIENT
Start: 2025-08-08 | End: 2025-08-08

## 2025-08-08 RX ORDER — AMLODIPINE BESYLATE 5 MG/1
5 TABLET ORAL DAILY
Qty: 30 TABLET | Refills: 0 | Status: SHIPPED | OUTPATIENT
Start: 2025-08-08 | End: 2025-08-14

## 2025-08-08 RX ORDER — MAGNESIUM OXIDE 400 MG/1
400 TABLET ORAL ONCE
Status: COMPLETED | OUTPATIENT
Start: 2025-08-08 | End: 2025-08-08

## 2025-08-08 RX ORDER — SODIUM CHLORIDE 1 G/1
1 TABLET ORAL ONCE
Status: COMPLETED | OUTPATIENT
Start: 2025-08-08 | End: 2025-08-08

## 2025-08-11 ENCOUNTER — PATIENT OUTREACH (OUTPATIENT)
Age: 75
End: 2025-08-11

## 2025-08-11 ENCOUNTER — PATIENT OUTREACH (OUTPATIENT)
Dept: CASE MANAGEMENT | Age: 75
End: 2025-08-11

## 2025-08-14 ENCOUNTER — OFFICE VISIT (OUTPATIENT)
Age: 75
End: 2025-08-14

## 2025-08-14 ENCOUNTER — LAB ENCOUNTER (OUTPATIENT)
Dept: LAB | Age: 75
End: 2025-08-14
Attending: FAMILY MEDICINE

## 2025-08-14 VITALS
BODY MASS INDEX: 21.52 KG/M2 | DIASTOLIC BLOOD PRESSURE: 54 MMHG | RESPIRATION RATE: 16 BRPM | HEIGHT: 68 IN | HEART RATE: 108 BPM | SYSTOLIC BLOOD PRESSURE: 122 MMHG | WEIGHT: 142 LBS | TEMPERATURE: 98 F | OXYGEN SATURATION: 98 %

## 2025-08-14 DIAGNOSIS — I10 ESSENTIAL HYPERTENSION: ICD-10-CM

## 2025-08-14 DIAGNOSIS — E87.1 HYPONATREMIA: ICD-10-CM

## 2025-08-14 DIAGNOSIS — K74.00 LIVER FIBROSIS: ICD-10-CM

## 2025-08-14 DIAGNOSIS — R29.898 WEAKNESS OF BOTH LOWER EXTREMITIES: ICD-10-CM

## 2025-08-14 DIAGNOSIS — F10.10 ETOH ABUSE: ICD-10-CM

## 2025-08-14 DIAGNOSIS — K62.7 RADIATION INDUCED PROCTITIS: ICD-10-CM

## 2025-08-14 DIAGNOSIS — D50.0 IRON DEFICIENCY ANEMIA DUE TO CHRONIC BLOOD LOSS: ICD-10-CM

## 2025-08-14 DIAGNOSIS — E87.1 HYPONATREMIA: Primary | ICD-10-CM

## 2025-08-14 DIAGNOSIS — R26.9 GAIT DIFFICULTY: ICD-10-CM

## 2025-08-14 DIAGNOSIS — E03.9 HYPOTHYROIDISM, UNSPECIFIED TYPE: ICD-10-CM

## 2025-08-14 LAB
ANION GAP SERPL CALC-SCNC: 11 MMOL/L (ref 0–18)
BASOPHILS # BLD AUTO: 0.05 X10(3) UL (ref 0–0.2)
BASOPHILS NFR BLD AUTO: 1.6 %
BUN BLD-MCNC: 8 MG/DL (ref 9–23)
BUN/CREAT SERPL: 8.5 (ref 10–20)
CALCIUM BLD-MCNC: 9 MG/DL (ref 8.7–10.4)
CHLORIDE SERPL-SCNC: 101 MMOL/L (ref 98–112)
CO2 SERPL-SCNC: 20 MMOL/L (ref 21–32)
CREAT BLD-MCNC: 0.94 MG/DL (ref 0.7–1.3)
DEPRECATED RDW RBC AUTO: 69.1 FL (ref 35.1–46.3)
EGFRCR SERPLBLD CKD-EPI 2021: 86 ML/MIN/1.73M2 (ref 60–?)
EOSINOPHIL # BLD AUTO: 0.1 X10(3) UL (ref 0–0.7)
EOSINOPHIL NFR BLD AUTO: 3.1 %
ERYTHROCYTE [DISTWIDTH] IN BLOOD BY AUTOMATED COUNT: 19.3 % (ref 11–15)
FASTING STATUS PATIENT QL REPORTED: NO
GLUCOSE BLD-MCNC: 167 MG/DL (ref 70–99)
HCT VFR BLD AUTO: 26 % (ref 39–53)
HGB BLD-MCNC: 8.5 G/DL (ref 13–17.5)
IMM GRANULOCYTES # BLD AUTO: 0.02 X10(3) UL (ref 0–1)
IMM GRANULOCYTES NFR BLD: 0.6 %
LYMPHOCYTES # BLD AUTO: 0.64 X10(3) UL (ref 1–4)
LYMPHOCYTES NFR BLD AUTO: 19.9 %
MCH RBC QN AUTO: 32.2 PG (ref 26–34)
MCHC RBC AUTO-ENTMCNC: 32.7 G/DL (ref 31–37)
MCV RBC AUTO: 98.5 FL (ref 80–100)
MONOCYTES # BLD AUTO: 0.44 X10(3) UL (ref 0.1–1)
MONOCYTES NFR BLD AUTO: 13.7 %
NEUTROPHILS # BLD AUTO: 1.96 X10 (3) UL (ref 1.5–7.7)
NEUTROPHILS # BLD AUTO: 1.96 X10(3) UL (ref 1.5–7.7)
NEUTROPHILS NFR BLD AUTO: 61.1 %
OSMOLALITY SERPL CALC.SUM OF ELEC: 276 MOSM/KG (ref 275–295)
PLATELET # BLD AUTO: 286 10(3)UL (ref 150–450)
PLATELET MORPHOLOGY: NORMAL
POTASSIUM SERPL-SCNC: 4.3 MMOL/L (ref 3.5–5.1)
RBC # BLD AUTO: 2.64 X10(6)UL (ref 3.8–5.8)
SODIUM SERPL-SCNC: 132 MMOL/L (ref 136–145)
WBC # BLD AUTO: 3.2 X10(3) UL (ref 4–11)

## 2025-08-14 PROCEDURE — 85025 COMPLETE CBC W/AUTO DIFF WBC: CPT | Performed by: FAMILY MEDICINE

## 2025-08-14 PROCEDURE — 36415 COLL VENOUS BLD VENIPUNCTURE: CPT

## 2025-08-14 PROCEDURE — 80048 BASIC METABOLIC PNL TOTAL CA: CPT

## 2025-08-14 PROCEDURE — 99215 OFFICE O/P EST HI 40 MIN: CPT | Performed by: FAMILY MEDICINE

## 2025-08-14 RX ORDER — AMLODIPINE BESYLATE 5 MG/1
5 TABLET ORAL DAILY
Qty: 90 TABLET | Refills: 3 | Status: SHIPPED | OUTPATIENT
Start: 2025-08-14

## 2025-08-14 RX ORDER — LEVOTHYROXINE SODIUM 25 UG/1
25 TABLET ORAL
Qty: 90 TABLET | Refills: 3 | Status: SHIPPED | OUTPATIENT
Start: 2025-08-14

## 2025-08-18 ENCOUNTER — TELEPHONE (OUTPATIENT)
Age: 75
End: 2025-08-18

## (undated) DIAGNOSIS — D50.0 IRON DEFICIENCY ANEMIA DUE TO CHRONIC BLOOD LOSS: Primary | ICD-10-CM

## (undated) DIAGNOSIS — Z85.038 HISTORY OF COLON CANCER: Primary | ICD-10-CM

## (undated) DIAGNOSIS — K62.5 RECTAL BLEEDING: ICD-10-CM

## (undated) DIAGNOSIS — Z85.038 HISTORY OF COLON CANCER: ICD-10-CM

## (undated) DIAGNOSIS — Z85.038 PERSONAL HISTORY OF COLON CANCER: ICD-10-CM

## (undated) DEVICE — STAPLER SHEATH: Brand: ENDOWRIST

## (undated) DEVICE — ELECTRODE ES AD CRD L9FT COND ADH HYDRGEL REM

## (undated) DEVICE — SUTURE SILK 2-0 SA85H

## (undated) DEVICE — 40580 - THE PINK PAD - ADVANCED TRENDELENBURG POSITIONING KIT: Brand: 40580 - THE PINK PAD - ADVANCED TRENDELENBURG POSITIONING KIT

## (undated) DEVICE — GAMMEX® PI HYBRID SIZE 7, STERILE POWDER-FREE SURGICAL GLOVE, POLYISOPRENE AND NEOPRENE BLEND: Brand: GAMMEX

## (undated) DEVICE — UNDYED BRAIDED (POLYGLACTIN 910), SYNTHETIC ABSORBABLE SUTURE: Brand: COATED VICRYL

## (undated) DEVICE — PROXIMATE SKIN STAPLERS (35 WIDE) CONTAINS 35 STAINLESS STEEL STAPLES (FIXED HEAD): Brand: PROXIMATE

## (undated) DEVICE — STAPLER 60 RELOAD GREEN: Brand: SUREFORM

## (undated) DEVICE — NON-ADHERENT PAD PREPACK: Brand: TELFA

## (undated) DEVICE — DERMABOND LIQUID ADHESIVE

## (undated) DEVICE — TOWEL SURG OR 17X30IN BLUE

## (undated) DEVICE — INTENDED TO BE USED TO OCCLUDE, RETRACT AND IDENTIFY ARTERIES, VEINS, TENDONS AND NERVES IN SURGICAL PROCEDURES: Brand: STERION®  VESSEL LOOP

## (undated) DEVICE — SIGMOID SURGICAL SUCTION INSTRUMENT: Brand: ARGYLE

## (undated) DEVICE — CANNULA SEAL

## (undated) DEVICE — TUBING SCT CLR 6FT .25IN MDVC

## (undated) DEVICE — AIRSEAL 5 MM ACCESS PORT AND LOW PROFILE OBTURATOR WITH BLADELESS OPTICAL TIP, 120 MM LENGTH: Brand: AIRSEAL

## (undated) DEVICE — SOL  .9 3000ML

## (undated) DEVICE — 3M™ IOBAN™ 2 ANTIMICROBIAL INCISE DRAPE 6650EZ: Brand: IOBAN™ 2

## (undated) DEVICE — SUTURE SILK 3-0 SH

## (undated) DEVICE — STAPLER 60: Brand: SUREFORM

## (undated) DEVICE — BLADELESS OBTURATOR: Brand: WECK VISTA

## (undated) DEVICE — KIT CLEAN ENDOKIT 1.1OZ GOWNX2

## (undated) DEVICE — 3M™ TEGADERM™ TRANSPARENT FILM DRESSING, 1626W, 4 IN X 4-3/4 IN (10 CM X 12 CM), 50 EACH/CARTON, 4 CARTON/CASE: Brand: 3M™ TEGADERM™

## (undated) DEVICE — A P RESECTION: Brand: MEDLINE INDUSTRIES, INC.

## (undated) DEVICE — ENDOPATH ETS-FLEX45 ARTICULATING ENDOSCOPIC LINEAR CUTTER, NO RELOAD: Brand: ENDOPATH

## (undated) DEVICE — SUTURE SILK 2-0 SH

## (undated) DEVICE — CHLORAPREP 26ML APPLICATOR

## (undated) DEVICE — GOWN SURG AERO BLUE PERF XLG

## (undated) DEVICE — MEGA SUTURECUT ND: Brand: ENDOWRIST

## (undated) DEVICE — TIP COVER ACCESSORY

## (undated) DEVICE — TRI-LUMEN FILTERED TUBE SET WITH ACTIVATED CHARCOAL FILTER: Brand: AIRSEAL

## (undated) DEVICE — DISPOSABLE GRASPER: Brand: EPIX LAPAROSCOPIC GRASPER

## (undated) DEVICE — DRAPE,UNDRBUT,WHT GRAD PCH,CAPPORT,20/CS: Brand: MEDLINE

## (undated) DEVICE — BANDAGE ROLL,100% COTTON, 6 PLY, LARGE: Brand: KERLIX

## (undated) DEVICE — TIP-UP FENESTRATED GRASPER: Brand: ENDOWRIST

## (undated) DEVICE — Device

## (undated) DEVICE — DRAPE SHEET LAPCHOLE 124X100X7

## (undated) DEVICE — ARM DRAPE

## (undated) DEVICE — LEGGINGS SRG 48X31IN CUF STRL

## (undated) DEVICE — KIT MFLD FOR SPEC COLL

## (undated) DEVICE — SUTURE PROLENE 3-0 SH

## (undated) DEVICE — SUTURE SILK 0 FSL

## (undated) DEVICE — ENDOPATH ETS45 2.5MM RELOADS (VASCULAR/THIN): Brand: ENDOPATH

## (undated) DEVICE — SEAL

## (undated) DEVICE — SNARE ENDOSCOPIC 10MM ROUND

## (undated) DEVICE — COLUMN DRAPE

## (undated) DEVICE — VESSEL SEALER EXTEND: Brand: ENDOWRIST

## (undated) DEVICE — LAPAROSCOPIC ACCESS SYSTEM: Brand: ALEXIS LAPAROSCOPIC SYSTEM WITH KII FIOS FIRST ENTRY

## (undated) DEVICE — SOL  .9 1000ML BTL

## (undated) DEVICE — DISPOSABLE SUCTION/IRRIGATOR TUBE SET: Brand: AHTO

## (undated) DEVICE — Device: Brand: DISPOSABLE BULB & BLADDER

## (undated) DEVICE — CLIP HEMOLOK LARGE PURPLE

## (undated) DEVICE — KIT ENDO ORCAPOD 160/180/190

## (undated) DEVICE — STAPLER CIRCULAR ENDO 29MM

## (undated) DEVICE — LUBRICANT JLY SURGILUBE 2OZ

## (undated) DEVICE — SUTURE VICRYL 0 J906G

## (undated) DEVICE — SUTURE PASSOR WITH GUIDE

## (undated) DEVICE — VIOLET BRAIDED (POLYGLACTIN 910), SYNTHETIC ABSORBABLE SUTURE: Brand: COATED VICRYL

## (undated) DEVICE — LAPAROVUE VISIBILITY SYSTEM LAPAROSCOPIC SOLUTIONS: Brand: LAPAROVUE

## (undated) DEVICE — INSUFFLATION NEEDLE TO ESTABLISH PNEUMOPERITONEUM.: Brand: INSUFFLATION NEEDLE

## (undated) DEVICE — FENESTRATED BIPOLAR FORCEPS: Brand: ENDOWRIST

## (undated) NOTE — LETTER
Hospital Discharge Documentation  Please phone to schedule a hospital follow up appointment.     From: [address] [facility]  Phone: [phone]    Patient discharged time/date: 7/6/2021  1:10 PM  Patient discharge disposition:  Home or Self descending colon, 3 c sigmoid polyp at 25 cm which was removed, and a large, hard friable mass at 14-15 cm from anal verge. Biopsy was taken.  Pathology consistent with moderately differentiated adenocarcinoma in a background of tubovillous adenoma with sev hours as needed for Pain. Enoxaparin Sodium 40 MG/0.4ML Soln  Commonly known as: LOVENOX  Inject 0.4 mL (40 mg total) into the skin daily for 24 days.      guaiFENesin-codeine 100-10 MG/5ML Soln  Commonly known as: ROBITUSSIN AC  Take 5 mL by mouth ever help manage your pain at home:  • Take your medications as directed and as needed. You may also take 1000 mg acetaminophen (Tylenol®) every 6 hours as needed for pain. • Call our office if the medication prescribed for you does not ease your pain.   • Merryl Locust' following:  • Dairy products, including milk, cheese and ice cream  • Coffee  • Spicy foods  • Chocolate  • Fried foods  • Gravies and cream sauces  • High-fat deli meats  • Greasy meats, such as sausage and rasmussen  • Fruit juices  Following the [employer] c foods. If a particular food makes you feel unwell, stop eating it and try it again 2 to 3 weeks later. Finding foods that are best for you may require some trial and error. Drink plenty of liquids. Try to drink 8 (8-ounce) glasses of liquids every day.  A [address] [place] [address] Ludwin Fish MD In 2 weeks.     Specialty: Family Medicine  Contact information:  [address] [phone]                   -PCP in [] withi

## (undated) NOTE — LETTER
2/14/2025    Lloyd Bui        [address]        [place] [address]            Dear Lloyd Bui,      Our records indicate that you are due for an appointment for a Colonoscopy with Burak Barrera MD. Our doctors are booking out about 3-6 months in advance for procedures.     Please call our office to schedule this appointment.  Your medical well-being is important to us.    If your insurance requires a referral, please call your primary care office to request one.      Thank you,      The Physicians and Staff at [facility]

## (undated) NOTE — LETTER
3/15/2022    Xuan Mart        [address] [place] [id number]            Dear Xuan Mart,      [facility] records indicate that you are due for an appointment for a Colonoscopy in June 2022, or sometime there after, with Cheral Harada, MD.    Please call our office to schedule this appointment. Your medical well-being is important to us. If your insurance requires a referral, please call your primary care office to request one.       Thank you,      The Physicians and Staff at [facility]

## (undated) NOTE — LETTER
05/05/25        Dear Lloyd Bui,      Our records indicate that you are due for an appointment for a Colonoscopy with Burak Barrera MD. Our doctors are booking out about 3-6 months in advance for procedures.     Please call our office to schedule this appointment.  Your medical well-being is important to us.    If your insurance requires a referral, please call your primary care office to request one.      Thank you,      The Physicians and Staff at [facility]

## (undated) NOTE — LETTER
6/15/2023              [address] [place]         Dear Homero Cornejo,    [facility] records indicate that the tests ordered for you by Karen Celis MD  have not been done. If you have, in fact, already completed the tests or you do not wish to have the tests done, please contact our office at [address] [place] [address]. Otherwise, please proceed with the testing. Enclosed is a duplicate order for your convenience. To schedule a test at any [facility], call [facility] at [phone], Monday through Friday between 7:30am to 6pm and on Saturday between 8am and 1pm.   Evening and weekend appointments for your exam are available.      US LIVER ([id number]) (Order #[id number]) on 1/24/23      Sincerely,    Karen Celis MD  [facility], [address] [address] [phone]

## (undated) NOTE — LETTER
3/14/2023              Miguel Angel Wahl        [address] [facility]         Dear Sandra Sal,    [facility] records indicate that the tests ordered for you by Homero Weinstein MD  have not been done. If you have, in fact, already completed the tests or you do not wish to have the tests done, please contact our office at [address] [place] [address]. Otherwise, please proceed with the testing. Enclosed is a duplicate order for your convenience.       Please call Central Scheduling at [phone] to schedule this test order:              [address] (UAL=[id number]) ([id number]) [[id number]] (Order [id number])            Sincerely,    Homero Weinstein MD  [facility], [address] [address]  [phone]